# Patient Record
Sex: FEMALE | Race: WHITE | Employment: FULL TIME | URBAN - METROPOLITAN AREA
[De-identification: names, ages, dates, MRNs, and addresses within clinical notes are randomized per-mention and may not be internally consistent; named-entity substitution may affect disease eponyms.]

---

## 2017-04-09 ENCOUNTER — HOSPITAL ENCOUNTER (EMERGENCY)
Age: 32
Discharge: HOME | End: 2017-04-09
Attending: EMERGENCY MEDICINE | Admitting: EMERGENCY MEDICINE
Payer: COMMERCIAL

## 2017-04-09 ENCOUNTER — APPOINTMENT (OUTPATIENT)
Dept: GENERAL RADIOLOGY | Age: 32
End: 2017-04-09
Attending: EMERGENCY MEDICINE
Payer: COMMERCIAL

## 2017-04-09 ENCOUNTER — APPOINTMENT (OUTPATIENT)
Dept: CT IMAGING | Age: 32
End: 2017-04-09
Attending: EMERGENCY MEDICINE
Payer: COMMERCIAL

## 2017-04-09 VITALS
RESPIRATION RATE: 18 BRPM | OXYGEN SATURATION: 100 % | SYSTOLIC BLOOD PRESSURE: 128 MMHG | TEMPERATURE: 98 F | HEART RATE: 97 BPM | HEIGHT: 63 IN | WEIGHT: 225 LBS | DIASTOLIC BLOOD PRESSURE: 79 MMHG | BODY MASS INDEX: 39.87 KG/M2

## 2017-04-09 DIAGNOSIS — R20.2 TINGLING: Primary | ICD-10-CM

## 2017-04-09 DIAGNOSIS — R53.1 WEAKNESS: ICD-10-CM

## 2017-04-09 LAB
ALBUMIN SERPL BCP-MCNC: 3.8 G/DL (ref 3.5–5)
ALBUMIN/GLOB SERPL: 1.1 {RATIO} (ref 1.1–2.2)
ALP SERPL-CCNC: 33 U/L (ref 45–117)
ALT SERPL-CCNC: 26 U/L (ref 12–78)
ANION GAP BLD CALC-SCNC: 8 MMOL/L (ref 5–15)
APPEARANCE UR: CLEAR
AST SERPL W P-5'-P-CCNC: 7 U/L (ref 15–37)
BACTERIA URNS QL MICRO: NEGATIVE /HPF
BASOPHILS # BLD AUTO: 0 K/UL (ref 0–0.1)
BASOPHILS # BLD: 0 % (ref 0–1)
BILIRUB SERPL-MCNC: 0.3 MG/DL (ref 0.2–1)
BILIRUB UR QL: NEGATIVE
BUN SERPL-MCNC: 7 MG/DL (ref 6–20)
BUN/CREAT SERPL: 10 (ref 12–20)
CALCIUM SERPL-MCNC: 9.1 MG/DL (ref 8.5–10.1)
CHLORIDE SERPL-SCNC: 108 MMOL/L (ref 97–108)
CO2 SERPL-SCNC: 23 MMOL/L (ref 21–32)
COLOR UR: ABNORMAL
CREAT SERPL-MCNC: 0.72 MG/DL (ref 0.55–1.02)
EOSINOPHIL # BLD: 0.1 K/UL (ref 0–0.4)
EOSINOPHIL NFR BLD: 1 % (ref 0–7)
EPITH CASTS URNS QL MICRO: ABNORMAL /LPF
ERYTHROCYTE [DISTWIDTH] IN BLOOD BY AUTOMATED COUNT: 12.3 % (ref 11.5–14.5)
ERYTHROCYTE [SEDIMENTATION RATE] IN BLOOD: 7 MM/HR (ref 0–20)
GLOBULIN SER CALC-MCNC: 3.6 G/DL (ref 2–4)
GLUCOSE SERPL-MCNC: 99 MG/DL (ref 65–100)
GLUCOSE UR STRIP.AUTO-MCNC: NEGATIVE MG/DL
HCT VFR BLD AUTO: 39.4 % (ref 35–47)
HGB BLD-MCNC: 13.5 G/DL (ref 11.5–16)
HGB UR QL STRIP: ABNORMAL
HYALINE CASTS URNS QL MICRO: ABNORMAL /LPF (ref 0–5)
KETONES UR QL STRIP.AUTO: NEGATIVE MG/DL
LEUKOCYTE ESTERASE UR QL STRIP.AUTO: NEGATIVE
LYMPHOCYTES # BLD AUTO: 28 % (ref 12–49)
LYMPHOCYTES # BLD: 2.4 K/UL (ref 0.8–3.5)
MCH RBC QN AUTO: 29.9 PG (ref 26–34)
MCHC RBC AUTO-ENTMCNC: 34.3 G/DL (ref 30–36.5)
MCV RBC AUTO: 87.2 FL (ref 80–99)
MONOCYTES # BLD: 0.5 K/UL (ref 0–1)
MONOCYTES NFR BLD AUTO: 5 % (ref 5–13)
NEUTS SEG # BLD: 5.6 K/UL (ref 1.8–8)
NEUTS SEG NFR BLD AUTO: 66 % (ref 32–75)
NITRITE UR QL STRIP.AUTO: NEGATIVE
PH UR STRIP: 5.5 [PH] (ref 5–8)
PLATELET # BLD AUTO: 230 K/UL (ref 150–400)
POTASSIUM SERPL-SCNC: 3.7 MMOL/L (ref 3.5–5.1)
PROT SERPL-MCNC: 7.4 G/DL (ref 6.4–8.2)
PROT UR STRIP-MCNC: NEGATIVE MG/DL
RBC # BLD AUTO: 4.52 M/UL (ref 3.8–5.2)
RBC #/AREA URNS HPF: ABNORMAL /HPF (ref 0–5)
SODIUM SERPL-SCNC: 139 MMOL/L (ref 136–145)
SP GR UR REFRACTOMETRY: 1.02 (ref 1–1.03)
TSH SERPL DL<=0.05 MIU/L-ACNC: 1.15 UIU/ML (ref 0.36–3.74)
UROBILINOGEN UR QL STRIP.AUTO: 0.2 EU/DL (ref 0.2–1)
WBC # BLD AUTO: 8.6 K/UL (ref 3.6–11)
WBC URNS QL MICRO: ABNORMAL /HPF (ref 0–4)

## 2017-04-09 PROCEDURE — 71020 XR CHEST PA LAT: CPT

## 2017-04-09 PROCEDURE — 85652 RBC SED RATE AUTOMATED: CPT | Performed by: EMERGENCY MEDICINE

## 2017-04-09 PROCEDURE — 36415 COLL VENOUS BLD VENIPUNCTURE: CPT | Performed by: EMERGENCY MEDICINE

## 2017-04-09 PROCEDURE — 99284 EMERGENCY DEPT VISIT MOD MDM: CPT

## 2017-04-09 PROCEDURE — 80053 COMPREHEN METABOLIC PANEL: CPT | Performed by: EMERGENCY MEDICINE

## 2017-04-09 PROCEDURE — 81001 URINALYSIS AUTO W/SCOPE: CPT | Performed by: EMERGENCY MEDICINE

## 2017-04-09 PROCEDURE — 70450 CT HEAD/BRAIN W/O DYE: CPT

## 2017-04-09 PROCEDURE — 84443 ASSAY THYROID STIM HORMONE: CPT | Performed by: EMERGENCY MEDICINE

## 2017-04-09 PROCEDURE — 85025 COMPLETE CBC W/AUTO DIFF WBC: CPT | Performed by: EMERGENCY MEDICINE

## 2017-04-09 RX ORDER — NORGESTIMATE AND ETHINYL ESTRADIOL 0.25-0.035
1 KIT ORAL DAILY
COMMUNITY
End: 2020-12-22 | Stop reason: ALTCHOICE

## 2017-04-09 NOTE — DISCHARGE INSTRUCTIONS
Numbness and Tingling: Care Instructions  Your Care Instructions  Many things can cause numbness or tingling. Swelling may put pressure on a nerve. This could cause you to lose feeling or have a pins-and-needles sensation on part of your body. Nerves may be damaged from trauma, toxins, or diseases, such as diabetes or multiple sclerosis (MS). Sometimes, though, the cause is not clear. If there is no clear reason for your symptoms, and you are not having any other symptoms, your doctor may suggest watching and waiting for a while to see if the numbness or tingling goes away on its own. Your doctor may want you to have blood or nerve tests to find the cause of your symptoms. Follow-up care is a key part of your treatment and safety. Be sure to make and go to all appointments, and call your doctor if you are having problems. It's also a good idea to know your test results and keep a list of the medicines you take. How can you care for yourself at home? · If your doctor prescribes medicine, take it exactly as directed. Call your doctor if you think you are having a problem with your medicine. · If you have any swelling, put ice or a cold pack on the area for 10 to 20 minutes at a time. Put a thin cloth between the ice and your skin. When should you call for help? Call 911 anytime you think you may need emergency care. For example, call if:  · You have weakness, numbness, or tingling in both legs. · You lose bowel or bladder control. · You have symptoms of a stroke. These may include:  ¨ Sudden numbness, tingling, weakness, or loss of movement in your face, arm, or leg, especially on only one side of your body. ¨ Sudden vision changes. ¨ Sudden trouble speaking. ¨ Sudden confusion or trouble understanding simple statements. ¨ Sudden problems with walking or balance. ¨ A sudden, severe headache that is different from past headaches.   Watch closely for changes in your health, and be sure to contact your doctor if you have any problems, or if:  · You do not get better as expected. Where can you learn more? Go to http://matthew-efraín.info/. Enter P278 in the search box to learn more about \"Numbness and Tingling: Care Instructions. \"  Current as of: October 14, 2016  Content Version: 11.2  © 9935-1696 [employer]. Care instructions adapted under license by [employer] (which disclaims liability or warranty for this information). If you have questions about a medical condition or this instruction, always ask your healthcare professional. [place] [address] any warranty or liability for your use of this information. We hope that we have addressed all of your medical concerns. The examination and treatment you received in the Emergency Department were for an emergent problem and were not intended as complete care. It is important that you follow up with your healthcare provider(s) for ongoing care. If your symptoms worsen or do not improve as expected, and you are unable to reach your usual health care provider(s), you should return to the Emergency Department. Today's healthcare is undergoing tremendous change, and patient satisfaction surveys are one of the many tools to assess the quality of medical care. You may receive a survey from the [employer] regarding your experience in the Emergency Department. I hope that your experience has been completely positive, particularly the medical care that I provided. As such, please participate in the survey; anything less than excellent does not meet my expectations or intentions. [address] [facility] and [facility] participate in nationally recognized quality of care measures.   If your blood pressure is greater than 120/80, as reported below, we urge that you seek medical care to address the potential of high blood pressure, commonly known as hypertension. Hypertension can be hereditary or can be caused by certain medical conditions, pain, stress, or \"white coat syndrome. \"       Please make an appointment with your health care provider(s) for follow up of your Emergency Department visit. VITALS:   Patient Vitals for the past 8 hrs:   Temp Pulse Resp BP SpO2   04/09/17 1730 - - - 136/72 97 %   04/09/17 1711 - - - (!) 136/93 -   04/09/17 1630 - - - 133/65 96 %   04/09/17 1508 - - - 130/79 -   04/09/17 1329 98.2 °F (36.8 °C) 97 18 124/86 97 %          Thank you for allowing us to provide you with medical care today. We realize that you have many choices for your emergency care needs. Please choose us in the future for any continued health care needs. Tim Soler Darren, [address] [place] [address].   Office: [phone]            Recent Results (from the past 24 hour(s))   CBC WITH AUTOMATED DIFF    Collection Time: 04/09/17  1:36 PM   Result Value Ref Range    WBC 8.6 3.6 - 11.0 K/uL    RBC 4.52 3.80 - 5.20 M/uL    HGB 13.5 11.5 - 16.0 g/dL    HCT 39.4 35.0 - 47.0 %    MCV 87.2 80.0 - 99.0 FL    MCH 29.9 26.0 - 34.0 PG    MCHC 34.3 30.0 - 36.5 g/dL    RDW 12.3 11.5 - 14.5 %    PLATELET 868 189 - 909 K/uL    NEUTROPHILS 66 32 - 75 %    LYMPHOCYTES 28 12 - 49 %    MONOCYTES 5 5 - 13 %    EOSINOPHILS 1 0 - 7 %    BASOPHILS 0 0 - 1 %    ABS. NEUTROPHILS 5.6 1.8 - 8.0 K/UL    ABS. LYMPHOCYTES 2.4 0.8 - 3.5 K/UL    ABS. MONOCYTES 0.5 0.0 - 1.0 K/UL    ABS. EOSINOPHILS 0.1 0.0 - 0.4 K/UL    ABS.  BASOPHILS 0.0 0.0 - 0.1 K/UL   METABOLIC PANEL, COMPREHENSIVE    Collection Time: 04/09/17  1:36 PM   Result Value Ref Range    Sodium 139 136 - 145 mmol/L    Potassium 3.7 3.5 - 5.1 mmol/L    Chloride 108 97 - 108 mmol/L    CO2 23 21 - 32 mmol/L    Anion gap 8 5 - 15 mmol/L    Glucose 99 65 - 100 mg/dL    BUN 7 6 - 20 MG/DL    Creatinine 0.72 0.55 - 1.02 MG/DL    BUN/Creatinine ratio 10 (L) 12 - 20      GFR est AA >60 >60 ml/min/1.73m2    GFR est non-AA >60 >60 ml/min/1.73m2    Calcium 9.1 8.5 - 10.1 MG/DL    Bilirubin, total 0.3 0.2 - 1.0 MG/DL    ALT (SGPT) 26 12 - 78 U/L    AST (SGOT) 7 (L) 15 - 37 U/L    Alk. phosphatase 33 (L) 45 - 117 U/L    Protein, total 7.4 6.4 - 8.2 g/dL    Albumin 3.8 3.5 - 5.0 g/dL    Globulin 3.6 2.0 - 4.0 g/dL    A-G Ratio 1.1 1.1 - 2.2     SED RATE (ESR)    Collection Time: 04/09/17  1:36 PM   Result Value Ref Range    Sed rate, automated 7 0 - 20 mm/hr   TSH 3RD GENERATION    Collection Time: 04/09/17  1:36 PM   Result Value Ref Range    TSH 1.15 0.36 - 3.74 uIU/mL   URINALYSIS W/MICROSCOPIC    Collection Time: 04/09/17  3:51 PM   Result Value Ref Range    Color YELLOW/STRAW      Appearance CLEAR CLEAR      Specific gravity 1.016 1.003 - 1.030      pH (UA) 5.5 5.0 - 8.0      Protein NEGATIVE  NEG mg/dL    Glucose NEGATIVE  NEG mg/dL    Ketone NEGATIVE  NEG mg/dL    Bilirubin NEGATIVE  NEG      Blood TRACE (A) NEG      Urobilinogen 0.2 0.2 - 1.0 EU/dL    Nitrites NEGATIVE  NEG      Leukocyte Esterase NEGATIVE  NEG      WBC 0-4 0 - 4 /hpf    RBC 5-10 0 - 5 /hpf    Epithelial cells FEW FEW /lpf    Bacteria NEGATIVE  NEG /hpf    Hyaline cast 2-5 0 - 5 /lpf       Xr Chest Pa Lat    Result Date: 4/9/2017  Clinical indication: Shortness of breath. Frontal and lateral views of the chest obtained. The heart size is normal. There is no acute infiltrate. impression: No acute changes. Ct Head Wo Cont    Result Date: 4/9/2017  EXAM:  CT HEAD WO CONT INDICATION:   Head trauma, closed, mild, GCS >= 13, no risk factors, neuro exam normal COMPARISON: None. TECHNIQUE: Unenhanced CT of the head was performed using 5 mm images. Brain and bone windows were generated. CT dose reduction was achieved through use of a standardized protocol tailored for this examination and automatic exposure control for dose modulation. FINDINGS: There is no extra-axial fluid collection hemorrhage shift or masses her.  Ventricles are normal in size and midline. impression: No acute changes.

## 2017-04-09 NOTE — ED PROVIDER NOTES
HPI Comments: 34 y.o. female with no significant past medical history who presents with chief complaint of numbness. Pt reports a tingling and pins/needles sensation to her bilateral hands (multiple fingers) which doesn't extend past the wrist, bottoms of her bilateral feet that radiates upwards with walking, and her anterior abdomen only, no back or flank involvement, all onset three days ago. She says that she has been dropping things due to the numbness, not weakness. She also reports nausea today. She denies any new medications. Pt denies weakness, SOB, CP, headache, gait problem, visual disturbance, abdominal pain, N/V/D, diaphoresis, urinary symptoms, recent long car rides or plane trips, recent illnesses, hx of DM, weight changes. There are no other acute medical concerns at this time. Social hx: works as  for [employer]  Significant family hx: pt's mother had MS  PCP: None    Note written by Eulogio Diane, as dictated by Bekah Quan MD 3:38 PM      The history is provided by the patient and the spouse. History reviewed. No pertinent past medical history. History reviewed. No pertinent surgical history. History reviewed. No pertinent family history. Social History     Social History    Marital status:      Spouse name: N/A    Number of children: N/A    Years of education: N/A     Occupational History    Not on file. Social History Main Topics    Smoking status: Not on file    Smokeless tobacco: Not on file    Alcohol use Not on file    Drug use: Not on file    Sexual activity: Not on file     Other Topics Concern    Not on file     Social History Narrative    No narrative on file         ALLERGIES: Review of patient's allergies indicates no known allergies. Review of Systems   Constitutional: Negative for appetite change, chills, fever and unexpected weight change. HENT: Negative for congestion.     Eyes: Negative for visual disturbance. Respiratory: Negative for cough, chest tightness, shortness of breath and wheezing. Cardiovascular: Negative for chest pain. Gastrointestinal: Positive for nausea. Negative for abdominal pain, diarrhea and vomiting. Genitourinary: Negative for difficulty urinating, dysuria, frequency and urgency. Musculoskeletal: Negative for gait problem and joint swelling. Skin: Negative for rash. Neurological: Positive for numbness. Negative for speech difficulty, weakness and headaches. All other systems reviewed and are negative. Vitals:    04/09/17 1630 04/09/17 1711 04/09/17 1730 04/09/17 1800   BP: 133/65 (!) 136/93 136/72 128/79   Pulse:       Resp:    18   Temp:    98 °F (36.7 °C)   SpO2: 96%  97% 100%   Weight:       Height:                Physical Exam   Constitutional: She is oriented to person, place, and time. She appears well-developed and well-nourished. No distress. HENT:   Head: Normocephalic and atraumatic. Nose: Nose normal.   Eyes: Conjunctivae are normal. Pupils are equal, round, and reactive to light. No scleral icterus. Neck: Normal range of motion. Neck supple. No JVD present. No tracheal deviation present. No thyromegaly present. Cardiovascular: Normal rate, regular rhythm and normal heart sounds. No murmur heard. Pulmonary/Chest: Effort normal and breath sounds normal. No respiratory distress. She has no wheezes. She has no rales. Abdominal: Soft. Bowel sounds are normal. She exhibits no mass. There is no tenderness. There is no rebound and no guarding. Musculoskeletal: Normal range of motion. She exhibits no edema. Neurological: She is alert and oriented to person, place, and time. She has normal strength. No cranial nerve deficit. Coordination normal.   Normal strength in upper extremities   Skin: Skin is warm and dry. No rash noted. She is not diaphoretic. No erythema. Psychiatric: She has a normal mood and affect.  Her behavior is normal.   Nursing note and vitals reviewed. Note written by Eulogio Driver, as dictated by Ivan Llanos MD 3:38 PM      MDM  Number of Diagnoses or Management Options  Tingling:   Weakness:     ED Course       Procedures      PROGRESS NOTE:  6:32 PM  Will d/c pt home to follow up with neurology, suspect MS vs Guillain-Barré syndrome. Normal labs, CXR, and CT in ED today.

## 2017-04-09 NOTE — ED NOTES
Patient has received discharge instructions from ER MD, verbalizes understanding. Ambulatory upon discharge with .

## 2017-04-09 NOTE — ED TRIAGE NOTES
Pt presents with complaints of numbness in bilateral feet, bilateral hands, abdomen, and torso that began on Thursday while at rest.

## 2017-04-11 ENCOUNTER — OFFICE VISIT (OUTPATIENT)
Dept: NEUROLOGY | Age: 32
End: 2017-04-11

## 2017-04-11 VITALS
WEIGHT: 224 LBS | SYSTOLIC BLOOD PRESSURE: 120 MMHG | OXYGEN SATURATION: 99 % | RESPIRATION RATE: 18 BRPM | HEART RATE: 98 BPM | HEIGHT: 63 IN | DIASTOLIC BLOOD PRESSURE: 82 MMHG | BODY MASS INDEX: 39.69 KG/M2

## 2017-04-11 DIAGNOSIS — G37.3 ACUTE TRANSVERSE MYELITIS (HCC): Primary | ICD-10-CM

## 2017-04-11 DIAGNOSIS — R20.0 BILATERAL HAND NUMBNESS: ICD-10-CM

## 2017-04-11 NOTE — PROGRESS NOTES
Neurology Consult Note      HISTORY PROVIDED BY: patient    Chief Complaint:   Chief Complaint   Patient presents with    Tingling     in hands, feet, and front of torso. Persistant since 4/6/17      Subjective:    Lilo Buckley is a 34 y.o. right handed female who presents in consultation for sensory changes. Symptoms started at 1:15AM on Thursday. Tingling in bottom of feet that will move up her legs when she walks, also in hands bilaterally in 4th and 5th digits, now moving across to other fingers. Also has tingling from bra line, down to just above her pubic bone, around to sides. In last few days this area has also felt tight. No pain. No obvious weakness, has some difficulty typing and writing. No bladder incontinence. Has had diarrhea, but she is very anxious about her sxs. No recent illness, had \"48 hour\" GI illness in mid-February. Went to [place] and was told to go to the ED. Was seen in ED 4/9/17 - head CT reviewed in PACS and is normal. CMP, CBC, TSH, UA are all unremarkable. Reports balance issues as a child, attributed to being \"clumsy. \"  H/o MS in mother. No previous stroke like-sxs or sudden vision loss. History reviewed. No pertinent past medical history.    Past Surgical History:   Procedure Laterality Date    HX ROTATOR CUFF REPAIR Right 1999      Social History     Social History    Marital status:      Spouse name: N/A    Number of children: N/A    Years of education: N/A     Occupational History     at [address] [place] [address] History Main Topics    Smoking status: Light Tobacco Smoker    Smokeless tobacco: Not on file      Comment: smokes socially    Alcohol use 1.2 - 1.8 oz/week     2 - 3 Standard drinks or equivalent per week    Drug use: No    Sexual activity: Not on file     Other Topics Concern    Not on file     Social History Narrative    Lives in [place] with      Family History   Problem Relation Age of Onset    MS Mother Dec 48yo    Other Father      Estranged    No Known Problems Sister     No Known Problems Brother     Other Brother      Hep C         Objective:   Review of Systems   Constitutional: Positive for malaise/fatigue. HENT: Negative. Eyes: Negative. Respiratory: Negative. Cardiovascular: Negative. Gastrointestinal: Positive for diarrhea. Genitourinary: Negative. Musculoskeletal: Negative. Skin: Negative. Neurological: Positive for sensory change. Endo/Heme/Allergies: Negative. Psychiatric/Behavioral: Negative. No Known Allergies     Meds:  Outpatient Medications Prior to Visit   Medication Sig Dispense Refill    norgestimate-ethinyl estradiol ([facility], ,) 0.25-35 mg-mcg tab Take 1 Tab by mouth daily. No facility-administered medications prior to visit. Imaging:  MRI Results (most recent):  No results found for this or any previous visit. CT Results (most recent):    Results from Hospital Encounter encounter on 04/09/17   CT HEAD WO CONT   Narrative EXAM:  CT HEAD WO CONT    INDICATION:   Head trauma, closed, mild, GCS >= 13, no risk factors, neuro exam  normal    COMPARISON: None. TECHNIQUE: Unenhanced CT of the head was performed using 5 mm images. Brain and  bone windows were generated. CT dose reduction was achieved through use of a  standardized protocol tailored for this examination and automatic exposure  control for dose modulation. FINDINGS:  There is no extra-axial fluid collection hemorrhage shift or masses her. Ventricles are normal in size and midline. Impression impression: No acute changes.              Reviewed records in [employer] and [employer] tab today    Lab Review   Results for orders placed or performed during the hospital encounter of 04/09/17   CBC WITH AUTOMATED DIFF   Result Value Ref Range    WBC 8.6 3.6 - 11.0 K/uL    RBC 4.52 3.80 - 5.20 M/uL    HGB 13.5 11.5 - 16.0 g/dL    HCT 39.4 35.0 - 47.0 %    MCV 87.2 80.0 - 99.0 FL    MCH 29.9 26.0 - 34.0 PG    MCHC 34.3 30.0 - 36.5 g/dL    RDW 12.3 11.5 - 14.5 %    PLATELET 775 459 - 598 K/uL    NEUTROPHILS 66 32 - 75 %    LYMPHOCYTES 28 12 - 49 %    MONOCYTES 5 5 - 13 %    EOSINOPHILS 1 0 - 7 %    BASOPHILS 0 0 - 1 %    ABS. NEUTROPHILS 5.6 1.8 - 8.0 K/UL    ABS. LYMPHOCYTES 2.4 0.8 - 3.5 K/UL    ABS. MONOCYTES 0.5 0.0 - 1.0 K/UL    ABS. EOSINOPHILS 0.1 0.0 - 0.4 K/UL    ABS. BASOPHILS 0.0 0.0 - 0.1 K/UL   METABOLIC PANEL, COMPREHENSIVE   Result Value Ref Range    Sodium 139 136 - 145 mmol/L    Potassium 3.7 3.5 - 5.1 mmol/L    Chloride 108 97 - 108 mmol/L    CO2 23 21 - 32 mmol/L    Anion gap 8 5 - 15 mmol/L    Glucose 99 65 - 100 mg/dL    BUN 7 6 - 20 MG/DL    Creatinine 0.72 0.55 - 1.02 MG/DL    BUN/Creatinine ratio 10 (L) 12 - 20      GFR est AA >60 >60 ml/min/1.73m2    GFR est non-AA >60 >60 ml/min/1.73m2    Calcium 9.1 8.5 - 10.1 MG/DL    Bilirubin, total 0.3 0.2 - 1.0 MG/DL    ALT (SGPT) 26 12 - 78 U/L    AST (SGOT) 7 (L) 15 - 37 U/L    Alk.  phosphatase 33 (L) 45 - 117 U/L    Protein, total 7.4 6.4 - 8.2 g/dL    Albumin 3.8 3.5 - 5.0 g/dL    Globulin 3.6 2.0 - 4.0 g/dL    A-G Ratio 1.1 1.1 - 2.2     URINALYSIS W/MICROSCOPIC   Result Value Ref Range    Color YELLOW/STRAW      Appearance CLEAR CLEAR      Specific gravity 1.016 1.003 - 1.030      pH (UA) 5.5 5.0 - 8.0      Protein NEGATIVE  NEG mg/dL    Glucose NEGATIVE  NEG mg/dL    Ketone NEGATIVE  NEG mg/dL    Bilirubin NEGATIVE  NEG      Blood TRACE (A) NEG      Urobilinogen 0.2 0.2 - 1.0 EU/dL    Nitrites NEGATIVE  NEG      Leukocyte Esterase NEGATIVE  NEG      WBC 0-4 0 - 4 /hpf    RBC 5-10 0 - 5 /hpf    Epithelial cells FEW FEW /lpf    Bacteria NEGATIVE  NEG /hpf    Hyaline cast 2-5 0 - 5 /lpf   SED RATE (ESR)   Result Value Ref Range    Sed rate, automated 7 0 - 20 mm/hr   TSH 3RD GENERATION   Result Value Ref Range    TSH 1.15 0.36 - 3.74 uIU/mL        Exam:  Visit Vitals    /82    Pulse 98    Resp 18    Ht 5' 3\" (1.6 m)    Wt 101.6 kg (224 lb)    LMP 03/19/2017    SpO2 99%    BMI 39.68 kg/m2     General:  Alert, cooperative, no distress. Head:  Normocephalic, without obvious abnormality, atraumatic. Respiratory:  Heart:   Non labored breathing  Regular rate and rhythm, no murmurs   Neck:   2+ carotids, no bruits   Extremities: Warm, no cyanosis or edema. Pulses: 2+ radial pulses. Neurologic:  MS: Alert and oriented x 4, speech intact. Language intact. Attention and fund of knowledge appropriate. Recent and remote memory intact. Cranial Nerves:  II: visual fields Full to confrontation   II: pupils Equal, round, reactive to light   II: optic disc    III,VII: ptosis none   III,IV,VI: extraocular muscles  EOMI, no nystagmus or diplopia   V: facial light touch sensation  normal   VII: facial muscle function   symmetric   VIII: hearing intact   IX: soft palate elevation  normal   XI: trapezius strength  5/5   XI: sternocleidomastoid strength 5/5   XII: tongue  Midline     Motor: normal bulk and tone, no tremor              Strength: 5/5 throughout, no PD  Sensory: intact to LT, PP, vibratory sensation  Coordination: FTN and HTS intact, MIL intact,Romberg negative  Gait: normal gait, able to tandem walk  Reflexes: 2+ symmetric, toes downgoing           Assessment/Plan   Pt is a 34 y.o. RH female with sudden onset of sensory changes 4 days ago, reporting tingling in bottom of feet that will move up her legs when she walks, hands bilaterally in 4th and 5th digits, now moving across to other fingers, tingling from bra line down to just above her pubic bone, around to sides and this area feels tight. Family h/o MS in her mother. Exam is unremarkable. Head CT 4/9/17 reviewed in PACS and is normal. CMP, CBC, TSH, UA- unremarkable. History is concerning for spinal cord lesion, possible transverse myelitis. Recommend MRI of C and T-spine with contrast. F/u to be determined. ICD-10-CM ICD-9-CM    1.  Acute transverse myelitis ([address] G37.3 341.20 MRI CERV SPINE W WO CONT      MRI [facility] SPINE W WO CONT   2. Bilateral hand numbness R20.0 782.0 MRI CERV SPINE W WO CONT      MRI [facility] SPINE W WO CONT       Signed:   Yamilka Stubbs MD  4/11/2017

## 2017-04-11 NOTE — MR AVS SNAPSHOT
Visit Information Date & Time Provider Department Dept. Phone Encounter #  
 4/11/2017 11:00 AM Yamilka Stubbs MD [facility] at [address] [id number] Follow-up Instructions Return if symptoms worsen or fail to improve. Upcoming Health Maintenance Date Due DTaP/Tdap/Td series (1 - Tdap) 1/8/2005 PAP AKA CERVICAL CYTOLOGY 1/8/2005 INFLUENZA AGE 9 TO ADULT 8/1/2016 Allergies as of 4/11/2017  Review Complete On: 4/11/2017 By: Bashir Rodriguez LPN No Known Allergies Current Immunizations  Never Reviewed No immunizations on file. Not reviewed this visit You Were Diagnosed With   
  
 Codes Comments [facility]    -  Primary ICD-10-CM: G37.3 ICD-9-CM: 341.20 Bilateral hand numbness     ICD-10-CM: R20.0 ICD-9-CM: 141. 0 Vitals BP Pulse Resp Height(growth percentile) Weight(growth percentile) LMP  
 120/82 98 18 5' 3\" (1.6 m) 224 lb (101.6 kg) 03/19/2017 SpO2 BMI OB Status Smoking Status 99% 39.68 kg/m2 Having regular periods Light Tobacco Smoker Vitals History BMI and BSA Data Body Mass Index Body Surface Area  
 39.68 kg/m 2 2.13 m 2 Your Updated Medication List  
  
   
This list is accurate as of: 4/11/17 11:49 AM.  Always use your most recent med list.  
  
  
  
  
 Jody Pollack (28) 0.25-35 mg-mcg Tab Generic drug:  norgestimate-ethinyl estradiol Take 1 Tab by mouth daily. Follow-up Instructions Return if symptoms worsen or fail to improve. To-Do List   
 04/19/2017 Imaging:  MRI [facility] SPINE W WO CONT   
  
 04/20/2017 Imaging:  MRI CERV SPINE W WO CONT Introducing [facility]! [facility] introduces [email or web address] patient portal. Now you can access parts of your medical record, email your doctor's office, and request medication refills online.    
 
1. In your internet browser, go to [email or web address] 2. Click on the First Time User? Click Here link in the Sign In box. You will see the New Member Sign Up page. 3. Enter your [employer] Access Code exactly as it appears below. You will not need to use this code after youve completed the sign-up process. If you do not sign up before the expiration date, you must request a new code. · [employer] Access Code: [email or web address] Expires: 7/8/2017  2:18 PM 
 
4. Enter the last four digits of your Social Security Number (xxxx) and Date of Birth (mm/dd/yyyy) as indicated and click Submit. You will be taken to the next sign-up page. 5. Create a [employer] ID. This will be your [employer] login ID and cannot be changed, so think of one that is secure and easy to remember. 6. Create a [employer] password. You can change your password at any time. 7. Enter your Password Reset Question and Answer. This can be used at a later time if you forget your password. 8. Enter your e-mail address. You will receive e-mail notification when new information is available in [address] 9. Click Sign Up. You can now view and download portions of your medical record. 10. Click the Download Summary menu link to download a portable copy of your medical information. If you have questions, please visit the Frequently Asked Questions section of the [employer] website. Remember, [employer] is NOT to be used for urgent needs. For medical emergencies, dial 911. Now available from your iPhone and Android! Please provide this summary of care documentation to your next provider. Your primary care clinician is listed as NONE. If you have any questions after today's visit, please call [phone].

## 2017-04-15 ENCOUNTER — HOSPITAL ENCOUNTER (OUTPATIENT)
Dept: MRI IMAGING | Age: 32
Discharge: HOME | End: 2017-04-15
Attending: PSYCHIATRY & NEUROLOGY
Payer: COMMERCIAL

## 2017-04-15 VITALS — BODY MASS INDEX: 39.86 KG/M2 | WEIGHT: 225 LBS

## 2017-04-15 DIAGNOSIS — R20.0 BILATERAL HAND NUMBNESS: ICD-10-CM

## 2017-04-15 DIAGNOSIS — G37.3 ACUTE TRANSVERSE MYELITIS (HCC): ICD-10-CM

## 2017-04-15 PROCEDURE — 74011250636 HC RX REV CODE- 250/636: Performed by: PSYCHIATRY & NEUROLOGY

## 2017-04-15 PROCEDURE — 72157 MRI CHEST SPINE W/O & W/DYE: CPT

## 2017-04-15 PROCEDURE — A9585 GADOBUTROL INJECTION: HCPCS | Performed by: PSYCHIATRY & NEUROLOGY

## 2017-04-15 PROCEDURE — 72156 MRI NECK SPINE W/O & W/DYE: CPT

## 2017-04-15 RX ADMIN — GADOBUTROL 10 ML: 604.72 INJECTION INTRAVENOUS at 14:32

## 2017-04-17 ENCOUNTER — HOSPITAL ENCOUNTER (OUTPATIENT)
Dept: MRI IMAGING | Age: 32
Discharge: HOME | End: 2017-04-17
Payer: COMMERCIAL

## 2017-04-17 ENCOUNTER — TELEPHONE (OUTPATIENT)
Dept: NEUROLOGY | Age: 32
End: 2017-04-17

## 2017-04-17 ENCOUNTER — DOCUMENTATION ONLY (OUTPATIENT)
Dept: NEUROLOGY | Age: 32
End: 2017-04-17

## 2017-04-17 DIAGNOSIS — G95.9 SPINAL CORD LESION (HCC): ICD-10-CM

## 2017-04-17 DIAGNOSIS — G95.9 SPINAL CORD LESION (HCC): Primary | ICD-10-CM

## 2017-04-17 PROCEDURE — A9585 GADOBUTROL INJECTION: HCPCS | Performed by: RADIOLOGY

## 2017-04-17 PROCEDURE — 70553 MRI BRAIN STEM W/O & W/DYE: CPT

## 2017-04-17 NOTE — PROGRESS NOTES
Received fax from [employer] of approved coverage for MRI. Authorization valid from 4/17/17 to 7/16/17. Reference number: [id number].

## 2017-04-17 NOTE — TELEPHONE ENCOUNTER
Patient scheduled for MRI at 12 pm today with an arrival time of 11:30 am at [facility] (address and phone number given to pt). SHALONDA will obtain any authorization needed and will call the office back if there are any issues with that. Patient informed of MRI appointment and informed to come to [facility] tomorrow morning at 9 am and to go to registration. Informed patient she may have to wait as Dr. Sylvia Eason is on call in the hospital and will be doing this procedure in between her hospital consults. Patient was given an opportunity to ask questions, repeated information, and verbalized understanding.

## 2017-04-17 NOTE — TELEPHONE ENCOUNTER
Spoke with bed placement services and reserved a bed for tomorrow on [facility] - neuro floor.

## 2017-04-17 NOTE — TELEPHONE ENCOUNTER
Called pt and discussed MRI findings and next steps. Plan for MRI brain, then LP, and then Solumedrol 1g IV x 3 days, followed by slow oral steroid taper. Couch -  Pt needs LP set up, I will be doing the LP just like last time so we do not need radiology involved, I just need a bed for 3 hours tomorrow. I have ordered an MRI brain w/wo contrast which will need to be done today or early tomorrow prior to the LP. Blood work can be done at the time of the LP.

## 2017-04-18 ENCOUNTER — TELEPHONE (OUTPATIENT)
Dept: NEUROLOGY | Age: 32
End: 2017-04-18

## 2017-04-18 ENCOUNTER — HOSPITAL ENCOUNTER (OUTPATIENT)
Age: 32
Discharge: HOME | End: 2017-04-18
Attending: PSYCHIATRY & NEUROLOGY | Admitting: PSYCHIATRY & NEUROLOGY
Payer: COMMERCIAL

## 2017-04-18 VITALS
SYSTOLIC BLOOD PRESSURE: 110 MMHG | OXYGEN SATURATION: 97 % | RESPIRATION RATE: 14 BRPM | DIASTOLIC BLOOD PRESSURE: 67 MMHG | HEART RATE: 66 BPM | TEMPERATURE: 98.3 F

## 2017-04-18 DIAGNOSIS — G95.9 SPINAL CORD LESION (HCC): ICD-10-CM

## 2017-04-18 DIAGNOSIS — G37.9 DEMYELINATING DISEASE (HCC): ICD-10-CM

## 2017-04-18 DIAGNOSIS — G37.9 DEMYELINATING DISEASE (HCC): Primary | ICD-10-CM

## 2017-04-18 LAB
APTT PPP: 25.2 SEC (ref 22.1–32.5)
CHARACTER SMN: CLEAR
CHARACTER SMN: CLEAR
COLOR SPUN CSF: COLORLESS
COLOR SPUN CSF: COLORLESS
GLUCOSE CSF-MCNC: 60 MG/DL (ref 40–70)
INR PPP: 1 (ref 0.9–1.1)
PROT CSF-MCNC: 27 MG/DL (ref 15–45)
PROTHROMBIN TIME: 9.9 SEC (ref 9–11.1)
RBC # CSF: 0 /CU MM
RBC # CSF: 1 /CU MM
THERAPEUTIC RANGE,PTTT: NORMAL SECS (ref 58–77)
TUBE # CSF: 1
TUBE # CSF: 2
TUBE # CSF: 2
TUBE # CSF: 4
WBC # CSF: 1 /CU MM (ref 0–5)
WBC # CSF: 3 /CU MM (ref 0–5)

## 2017-04-18 PROCEDURE — 82164 ANGIOTENSIN I ENZYME TEST: CPT | Performed by: PSYCHIATRY & NEUROLOGY

## 2017-04-18 PROCEDURE — 36415 COLL VENOUS BLD VENIPUNCTURE: CPT | Performed by: PSYCHIATRY & NEUROLOGY

## 2017-04-18 PROCEDURE — 62270 DX LMBR SPI PNXR: CPT

## 2017-04-18 PROCEDURE — 83520 IMMUNOASSAY QUANT NOS NONAB: CPT | Performed by: PSYCHIATRY & NEUROLOGY

## 2017-04-18 PROCEDURE — 82042 OTHER SOURCE ALBUMIN QUAN EA: CPT | Performed by: PSYCHIATRY & NEUROLOGY

## 2017-04-18 PROCEDURE — 82945 GLUCOSE OTHER FLUID: CPT | Performed by: PSYCHIATRY & NEUROLOGY

## 2017-04-18 PROCEDURE — 89050 BODY FLUID CELL COUNT: CPT | Performed by: PSYCHIATRY & NEUROLOGY

## 2017-04-18 PROCEDURE — 85730 THROMBOPLASTIN TIME PARTIAL: CPT | Performed by: PSYCHIATRY & NEUROLOGY

## 2017-04-18 PROCEDURE — 99218 HC RM OBSERVATION: CPT

## 2017-04-18 PROCEDURE — 84157 ASSAY OF PROTEIN OTHER: CPT | Performed by: PSYCHIATRY & NEUROLOGY

## 2017-04-18 PROCEDURE — 87205 SMEAR GRAM STAIN: CPT | Performed by: PSYCHIATRY & NEUROLOGY

## 2017-04-18 PROCEDURE — 85610 PROTHROMBIN TIME: CPT | Performed by: PSYCHIATRY & NEUROLOGY

## 2017-04-18 PROCEDURE — 88108 CYTOPATH CONCENTRATE TECH: CPT | Performed by: PSYCHIATRY & NEUROLOGY

## 2017-04-18 NOTE — TELEPHONE ENCOUNTER
Spoke with patient regarding solu medrol infusions that Dr. Renny Moraes would like for her to have. Advised that there may be a delay with using home health. Informed patient that the outpatient infusion center is open until 7 pm at [facility]. Patient is agreeable to coming to the outpatient infusion center. Advised will call back with further details. She stated understanding.

## 2017-04-18 NOTE — DISCHARGE INSTRUCTIONS
DISCHARGE SUMMARY from Nurse    The following personal items are in your possession at time of discharge:    Dental Appliances: None        Home Medications: None  Jewelry: None  Clothing: At bedside  Other Valuables: None             PATIENT INSTRUCTIONS:    After general anesthesia or intravenous sedation, for 24 hours or while taking prescription Narcotics:  · Limit your activities  · Do not drive and operate hazardous machinery  · Do not make important personal or business decisions  · Do  not drink alcoholic beverages  · If you have not urinated within 8 hours after discharge, please contact your surgeon on call. Report the following to your surgeon:  · Excessive pain, swelling, redness or odor of or around the surgical area  · Temperature over 100.5  · Nausea and vomiting lasting longer than 4 hours or if unable to take medications  · Any signs of decreased circulation or nerve impairment to extremity: change in color, persistent  numbness, tingling, coldness or increase pain  · Any questions        What to do at Home:  Recommended activity: No lifting, Driving, or Strenuous exercise for the rest of today, lay down and take it easy. If you experience any of the following symptoms - a headache that is severe or lasts for 2 days, please follow up with Dr Daquan Lafleur or your neurology clinic. *  Please give a list of your current medications to your Primary Care Provider. *  Please update this list whenever your medications are discontinued, doses are      changed, or new medications (including over-the-counter products) are added. *  Please carry medication information at all times in case of emergency situations. These are general instructions for a healthy lifestyle:    No smoking/ No tobacco products/ Avoid exposure to second hand smoke    Surgeon General's Warning:  Quitting smoking now greatly reduces serious risk to your health.     Obesity, smoking, and sedentary lifestyle greatly increases your risk for illness    A healthy diet, regular physical exercise & weight monitoring are important for maintaining a healthy lifestyle    You may be retaining fluid if you have a history of heart failure or if you experience any of the following symptoms:  Weight gain of 3 pounds or more overnight or 5 pounds in a week, increased swelling in our hands or feet or shortness of breath while lying flat in bed. Please call your doctor as soon as you notice any of these symptoms; do not wait until your next office visit. Recognize signs and symptoms of STROKE:    F-face looks uneven    A-arms unable to move or move unevenly    S-speech slurred or non-existent    T-time-call 911 as soon as signs and symptoms begin-DO NOT go       Back to bed or wait to see if you get better-TIME IS BRAIN. Warning Signs of HEART ATTACK     Call 911 if you have these symptoms:   Chest discomfort. Most heart attacks involve discomfort in the center of the chest that lasts more than a few minutes, or that goes away and comes back. It can feel like uncomfortable pressure, squeezing, fullness, or pain.  Discomfort in other areas of the upper body. Symptoms can include pain or discomfort in one or both arms, the back, neck, jaw, or stomach.  Shortness of breath with or without chest discomfort.  Other signs may include breaking out in a cold sweat, nausea, or lightheadedness. Don't wait more than five minutes to call 911 - MINUTES MATTER! Fast action can save your life. Calling 911 is almost always the fastest way to get lifesaving treatment. Emergency Medical Services staff can begin treatment when they arrive -- up to an hour sooner than if someone gets to the hospital by car. The discharge information has been reviewed with the patient. The patient verbalized understanding.     Discharge medications reviewed with the patient and appropriate educational materials and side effects teaching were provided.

## 2017-04-18 NOTE — TELEPHONE ENCOUNTER
Spoke with Tory. She stated the radiologist wanted to make sure that Dr. Adrianne Salvador has reviewed the patient's MRI that she had done yesterday. Verbally discussed with Dr. Adrianne Salvador and she stated she had reviewed it.

## 2017-04-18 NOTE — TELEPHONE ENCOUNTER
Outpatient infusion center calling because they dont have any appt's available tomorrow for the [facility] location but they have some appt's for [address] [place] [address].  Please give her a call back

## 2017-04-18 NOTE — OP NOTES
LUMBAR PUNCTURE PROCEDURE NOTE    NAME:     Tea Alfred   :       1985   MRN:       [id number]   DATE:      2017    After the procedure was explained, risks reviewed including bleeding, infection, post-LP headache, and nerve root damage, and questions answered, consent signed and placed on chart. Pt was placed in the left lateral decubitus position. The patient was prepped and draped in the usual sterile fashion. 2 cc of 1% lidocaine was used for local anesthesia. The L3-L4 interspace was identified and the spinal needle inserted without return of CSF. She was then transferred to seated position, the L3-L4 interspace was identified and the spinal needle was inserted without return of CSF with immediate return of clear spinal fluid. Opening pressure was 41 mmHg. Approximately 26cc of CSF was collected. The spinal needle was removed. The pt was left in the supine position in stable condition. There was minimal if any blood loss. There were no immediate complications at the time of the procedure. The patient tolerated the procedure well.

## 2017-04-18 NOTE — IP AVS SNAPSHOT
[address] [facility] [address] [place] [address] 
[phone] Patient: Noy Love MRN: [id number] :1985 You are allergic to the following No active allergies Recent Documentation OB Status Smoking Status Having regular periods Light Tobacco Smoker Unresulted Labs Order Current Status ANGIOTENSIN CONVERTING ENZYME, CSF In process AQUAPORIN-4 RECEPTOR AB In process CULTURE, CSF W GRAM STAIN In process MULTIPLE SCLEROSIS PANEL In process Emergency Contacts Name Discharge Info Relation Home Work Mobile IrvinJunatalee DISCHARGE CAREGIVER [3] Spouse [3] [phone] Gee Bryan  Spouse [3] [phone] About your hospitalization You were admitted on:  2017 You last received care in the:  [facility] 6S NEURO-SCI TELE You were discharged on:  2017 Unit phone number:  [phone] Why you were hospitalized Your primary diagnosis was:  Not on File Your diagnoses also included:  Demyelinating Disease (Hcc) Providers Seen During Your Hospitalizations Provider Role Specialty Primary office phone Santy Marvin MD Attending Provider Neurology [phone] Your Primary Care Physician (PCP) Primary Care Physician Office Phone Office Fax NONE ** None ** ** None ** Follow-up Information Follow up With Details Comments Contact Info Santy Marvin MD  Keep planned follow up appointment with your neurology clinic. [address] [facility] 207 [address] [place] [address] 
[phone] Current Discharge Medication List  
  
CONTINUE these medications which have NOT CHANGED Dose & Instructions Dispensing Information Comments Morning Noon Evening Bedtime 4299 [facility] (28) 0.25-35 mg-mcg Tab Generic drug:  norgestimate-ethinyl estradiol Your last dose was: Your next dose is: Dose:  1 Tab Take 1 Tab by mouth daily. Refills:  0 Discharge Instructions DISCHARGE SUMMARY from Nurse The following personal items are in your possession at time of discharge: 
 
Dental Appliances: None Home Medications: None Jewelry: None Clothing: At bedside Other Valuables: None PATIENT INSTRUCTIONS: 
 
After general anesthesia or intravenous sedation, for 24 hours or while taking prescription Narcotics: · Limit your activities · Do not drive and operate hazardous machinery · Do not make important personal or business decisions · Do  not drink alcoholic beverages · If you have not urinated within 8 hours after discharge, please contact your surgeon on call. Report the following to your surgeon: 
· Excessive pain, swelling, redness or odor of or around the surgical area · Temperature over 100.5 · Nausea and vomiting lasting longer than 4 hours or if unable to take medications · Any signs of decreased circulation or nerve impairment to extremity: change in color, persistent  numbness, tingling, coldness or increase pain · Any questions What to do at Home: 
Recommended activity: No lifting, Driving, or Strenuous exercise for the rest of today, lay down and take it easy. If you experience any of the following symptoms - a headache that is severe or lasts for 2 days, please follow up with Dr Susannah Gonzalez or your neurology clinic. *  Please give a list of your current medications to your Primary Care Provider. *  Please update this list whenever your medications are discontinued, doses are 
    changed, or new medications (including over-the-counter products) are added. *  Please carry medication information at all times in case of emergency situations. These are general instructions for a healthy lifestyle: No smoking/ No tobacco products/ Avoid exposure to second hand smoke Surgeon General's Warning:  Quitting smoking now greatly reduces serious risk to your health. Obesity, smoking, and sedentary lifestyle greatly increases your risk for illness A healthy diet, regular physical exercise & weight monitoring are important for maintaining a healthy lifestyle You may be retaining fluid if you have a history of heart failure or if you experience any of the following symptoms:  Weight gain of 3 pounds or more overnight or 5 pounds in a week, increased swelling in our hands or feet or shortness of breath while lying flat in bed. Please call your doctor as soon as you notice any of these symptoms; do not wait until your next office visit. Recognize signs and symptoms of STROKE: 
 
F-face looks uneven A-arms unable to move or move unevenly S-speech slurred or non-existent T-time-call 911 as soon as signs and symptoms begin-DO NOT go Back to bed or wait to see if you get better-TIME IS BRAIN. Warning Signs of HEART ATTACK Call 911 if you have these symptoms: 
? Chest discomfort. Most heart attacks involve discomfort in the center of the chest that lasts more than a few minutes, or that goes away and comes back. It can feel like uncomfortable pressure, squeezing, fullness, or pain. ? Discomfort in other areas of the upper body. Symptoms can include pain or discomfort in one or both arms, the back, neck, jaw, or stomach. ? Shortness of breath with or without chest discomfort. ? Other signs may include breaking out in a cold sweat, nausea, or lightheadedness. Don't wait more than five minutes to call [address]! Fast action can save your life. Calling 911 is almost always the fastest way to get lifesaving treatment. Emergency Medical Services staff can begin treatment when they arrive  up to an hour sooner than if someone gets to the hospital by car. The discharge information has been reviewed with the patient.   The patient verbalized understanding. Discharge medications reviewed with the patient and appropriate educational materials and side effects teaching were provided. Discharge Instructions Attachments/References LUMBAR PUNCTURE (ENGLISH) Discharge Orders None Introducing [facility] SERVICES! Isabel Amezcua introduces [employer] patient portal. Now you can access parts of your medical record, email your doctor's office, and request medication refills online. 1. In your internet browser, go to [email or web address] [email or web address] 2. Click on the First Time User? Click Here link in the Sign In box. You will see the New Member Sign Up page. 3. Enter your [facility] Access Code exactly as it appears below. You will not need to use this code after youve completed the sign-up process. If you do not sign up before the expiration date, you must request a new code. · [facility] Access Code: [email or web address] Expires: 7/8/2017  2:18 PM 
 
4. Enter the last four digits of your Social Security Number (xxxx) and Date of Birth (mm/dd/yyyy) as indicated and click Submit. You will be taken to the next sign-up page. 5. Create a [facility] ID. This will be your [facility] login ID and cannot be changed, so think of one that is secure and easy to remember. 6. Create a [facility] password. You can change your password at any time. 7. Enter your Password Reset Question and Answer. This can be used at a later time if you forget your password. 8. Enter your e-mail address. You will receive e-mail notification when new information is available in [address] 9. Click Sign Up. You can now view and download portions of your medical record. 10. Click the Download Summary menu link to download a portable copy of your medical information. If you have questions, please visit the Frequently Asked Questions section of the [employer] website. Remember, [facility] is NOT to be used for urgent needs. For medical emergencies, dial 911. Now available from your iPhone and Android! General Information Please provide this summary of care documentation to your next provider. Patient Signature:  ____________________________________________________________ Date:  ____________________________________________________________  
  
Michelle Robles Provider Signature:  ____________________________________________________________ Date:  ____________________________________________________________ More Information Lumbar Puncture: After Your Visit Your Care Instructions A lumbar puncture (also called a spinal tap) is a test to check the fluid that surrounds and protects your spinal cord and brain. Your doctor may have done this test to look for an infection. In some cases, a lumbar puncture is done to release pressure from too much fluid or to look for diseases such as multiple sclerosis. The fluid that was taken is often sent to a lab for different tests. Your doctor may get some answers right away, but other answers take hours to days. Your doctor will call you with the results. You may feel tired or have a mild backache or a headache after the test. Some people have trouble sleeping for 1 or 2 days. Follow-up care is a key part of your treatment and safety. Be sure to make and go to all appointments, and call your doctor if you are having problems. Its also a good idea to know your test results and keep a list of the medicines you take. How can you care for yourself at home? · Drink plenty of liquids in the next few hours. This may prevent a headache or keep a headache from being severe. · Your doctor may tell you to lie flat in bed for 1 to 4 hours. This may prevent a headache. · Get plenty of rest. 
· If your doctor prescribed antibiotics, take them as directed. Do not stop taking them just because you feel better. You need to take the full course of antibiotics. · Take anti-inflammatory medicines to reduce a headache or backache. These include ibuprofen (Advil, Motrin) and naproxen (Aleve). Read and follow all instructions on the label. When should you call for help? Call your doctor now or seek immediate medical care if: 
· You have a fever with a stiff neck or a severe headache. · You have any drainage or bleeding from the site of the puncture. · You feel numb or lose strength below the puncture site. Watch closely for changes in your health, and be sure to contact your doctor if: 
· You do not get better as expected. Where can you learn more? Go to [employer] Enter [id number] in the search box to learn more about \"Lumbar Puncture: After Your Visit. \"  
© 3816-8230 [employer]wise, [employer]. Care instructions adapted under license by [employer] (which disclaims liability or warranty for this information). This care instruction is for use with your licensed healthcare professional. If you have questions about a medical condition or this instruction, always ask your healthcare professional. [place] [address] any warranty or liability for your use of this information. Content Version: 5.1.035960; Last Revised: September 13, 2011

## 2017-04-18 NOTE — TELEPHONE ENCOUNTER
Rx and completed order form faxed to [place] [address] at [facility]. Informed patient of this information. Confirmed receipt of fax by Jason Malhotra at [place] [address].

## 2017-04-19 ENCOUNTER — HOSPITAL ENCOUNTER (OUTPATIENT)
Dept: INFUSION THERAPY | Age: 32
Discharge: HOME | End: 2017-04-19
Payer: COMMERCIAL

## 2017-04-19 ENCOUNTER — TELEPHONE (OUTPATIENT)
Dept: NEUROLOGY | Age: 32
End: 2017-04-19

## 2017-04-19 VITALS
DIASTOLIC BLOOD PRESSURE: 68 MMHG | RESPIRATION RATE: 18 BRPM | HEART RATE: 65 BPM | OXYGEN SATURATION: 98 % | SYSTOLIC BLOOD PRESSURE: 135 MMHG | TEMPERATURE: 98.1 F

## 2017-04-19 LAB — AQP4 H2O CHANNEL AB SERPL IA-ACNC: 1.9 U/ML (ref 0–3)

## 2017-04-19 PROCEDURE — 74011250636 HC RX REV CODE- 250/636: Performed by: PSYCHIATRY & NEUROLOGY

## 2017-04-19 PROCEDURE — 96365 THER/PROPH/DIAG IV INF INIT: CPT

## 2017-04-19 PROCEDURE — 74011000258 HC RX REV CODE- 258: Performed by: PSYCHIATRY & NEUROLOGY

## 2017-04-19 RX ORDER — SODIUM CHLORIDE 0.9 % (FLUSH) 0.9 %
5-10 SYRINGE (ML) INJECTION AS NEEDED
Status: ACTIVE | OUTPATIENT
Start: 2017-04-19 | End: 2017-04-20

## 2017-04-19 RX ADMIN — SODIUM CHLORIDE 1000 MG: 900 INJECTION, SOLUTION INTRAVENOUS at 13:55

## 2017-04-19 RX ADMIN — Medication 10 ML: at 13:50

## 2017-04-19 NOTE — PROGRESS NOTES
1330 Pt arrived ambulatory and in no distress for Solu-Medrol, 1/3. Assessment complete, pt reports tingling in both hands and feet. Discussed medication, purpose, possible side effects, and schedule. Pt verbalizes understanding. 22 gauge IV started in right forearm without difficulty. Patient Vitals for the past 12 hrs:   Temp Pulse Resp BP SpO2   04/19/17 1522 98.1 °F (36.7 °C) 65 18 135/68 98 %   04/19/17 1340 98 °F (36.7 °C) 88 18 140/83 98 %     Medications:  Solu-Medrol 1 gram IV    1525 Pt observed for 30 mins post infusion, no s/s of reaction. IV left intact for treatment tomorrow, flushed, alcohol cap intact covered with Coban. Discharged home ambulatory and in no distress. Next appointment 04/20/17 @ [facility].

## 2017-04-19 NOTE — TELEPHONE ENCOUNTER
Noted patient has been scheduled for infusions. Return call to [address] [place] [address] with outpatient infusion. Spoke with Dwight Islas. He stated that, per Tory, she spoke with \"someone at the doctor's office\" yesterday and set up the already scheduled appointments. They did not provide a name of who they spoke with and they did not call the patient to confirm these appointments. No documentation in the chart stating anyone spoke with the infusion center about these appointments. Writer called patient and she agreed to the scheduled appointments. Addresses and phone numbers were provided to the patient. Writer apologized for any inconvenience. Patient was given an opportunity to ask questions, repeated information, and verbalized understanding.

## 2017-04-19 NOTE — TELEPHONE ENCOUNTER
Pt was scheduled for an IV treatment at [address] [place] [address] this afternoon and was hoping to have it moved to a different location, would prefer not to go to that location.

## 2017-04-19 NOTE — PROGRESS NOTES
Problem: Knowledge Deficit  Goal: *Verbalizes understanding of procedures and medications  Outcome: Progressing Towards Goal  Solu Medrol 1/3. Discussed medication, purpose, procedure, and frequency. Pt verbalizes understanding.

## 2017-04-19 NOTE — TELEPHONE ENCOUNTER
Spoke with patient and advised her to call the [facility] location to see if they can reschedule her for a different location. Informed her that there are locations on the campuses of [facility] and [facility] as well. Patient asked if she had to start the infusions today. Writer told patient that Dr. Josué Herrera stated she did want her start them today. Patient was given an opportunity to ask questions, repeated information, and verbalized understanding.

## 2017-04-19 NOTE — PROGRESS NOTES
Spoke with patient and informed her that, per Dr. Eli Espino, CSF studies so far are normal. Patient was given an opportunity to ask questions, repeated information, and verbalized understanding.

## 2017-04-20 ENCOUNTER — HOSPITAL ENCOUNTER (OUTPATIENT)
Dept: INFUSION THERAPY | Age: 32
Discharge: HOME | End: 2017-04-20
Payer: COMMERCIAL

## 2017-04-20 VITALS
SYSTOLIC BLOOD PRESSURE: 125 MMHG | TEMPERATURE: 97.2 F | DIASTOLIC BLOOD PRESSURE: 74 MMHG | HEART RATE: 63 BPM | RESPIRATION RATE: 18 BRPM

## 2017-04-20 LAB
ALB CSF/SERPL: 3 {RATIO} (ref 0–8)
ALBUMIN CSF-MCNC: 13 MG/DL (ref 11–48)
ALBUMIN SERPL-MCNC: 4.1 G/DL (ref 3.5–5.5)
IGG CSF-MCNC: 1.7 MG/DL (ref 0–8.6)
IGG SERPL-MCNC: 710 MG/DL (ref 700–1600)
IGG SYNTH RATE SER+CSF CALC-MRATE: 0.7 MG/DAY
IGG/ALB CLEAR SER+CSF-RTO: 0.8 (ref 0–0.7)
IGG/ALB CSF: 0.13 {RATIO} (ref 0–0.25)
OLIGOCLONAL BANDS.IT SER+CSF QL: ABNORMAL

## 2017-04-20 PROCEDURE — 74011250636 HC RX REV CODE- 250/636: Performed by: PSYCHIATRY & NEUROLOGY

## 2017-04-20 PROCEDURE — 74011000258 HC RX REV CODE- 258: Performed by: PSYCHIATRY & NEUROLOGY

## 2017-04-20 PROCEDURE — 96365 THER/PROPH/DIAG IV INF INIT: CPT

## 2017-04-20 RX ORDER — SODIUM CHLORIDE 0.9 % (FLUSH) 0.9 %
5-10 SYRINGE (ML) INJECTION AS NEEDED
Status: ACTIVE | OUTPATIENT
Start: 2017-04-20 | End: 2017-04-21

## 2017-04-20 RX ORDER — SODIUM CHLORIDE 9 MG/ML
25 INJECTION, SOLUTION INTRAVENOUS AS NEEDED
Status: DISPENSED | OUTPATIENT
Start: 2017-04-20 | End: 2017-04-21

## 2017-04-20 RX ADMIN — Medication 10 ML: at 16:11

## 2017-04-20 RX ADMIN — SODIUM CHLORIDE 1000 MG: 900 INJECTION, SOLUTION INTRAVENOUS at 16:10

## 2017-04-20 NOTE — PROGRESS NOTES
Outpatient Infusion Center Progress Note    0973 Pt admit to [facility] for Day 2 of 3 Solumedrol ambulatory in stable condition. Assessment completed. No new concerns voiced. PIV in place from yesterday and flushed with positive blood return. IV attached to NS     Visit Vitals    /74 (BP 1 Location: Left arm, BP Patient Position: At rest)    Pulse 63    Temp 97.2 °F (36.2 °C)    Resp 18    LMP 03/19/2017       Medications:  Solu-medrol 1 gram IV    1715 Pt tolerated treatment well. PIV is tender at the insertion site so patient requested it be removed. PIV D/c'd. D/c home ambulatory in no distress. Pt aware of next appointment scheduled for 4/21/17.

## 2017-04-21 ENCOUNTER — HOSPITAL ENCOUNTER (OUTPATIENT)
Dept: INFUSION THERAPY | Age: 32
Discharge: HOME | End: 2017-04-21
Payer: COMMERCIAL

## 2017-04-21 ENCOUNTER — TELEPHONE (OUTPATIENT)
Dept: NEUROLOGY | Age: 32
End: 2017-04-21

## 2017-04-21 VITALS
RESPIRATION RATE: 18 BRPM | TEMPERATURE: 97.5 F | HEART RATE: 59 BPM | SYSTOLIC BLOOD PRESSURE: 132 MMHG | DIASTOLIC BLOOD PRESSURE: 81 MMHG

## 2017-04-21 LAB — ANGIO CONVERTING ENZ, CSF: 1.3 U/L (ref 0–2.5)

## 2017-04-21 PROCEDURE — 74011000258 HC RX REV CODE- 258: Performed by: PSYCHIATRY & NEUROLOGY

## 2017-04-21 PROCEDURE — 96365 THER/PROPH/DIAG IV INF INIT: CPT

## 2017-04-21 PROCEDURE — 74011250636 HC RX REV CODE- 250/636: Performed by: PSYCHIATRY & NEUROLOGY

## 2017-04-21 RX ORDER — PREDNISONE 10 MG/1
TABLET ORAL
Qty: 28 TAB | Refills: 0 | Status: SHIPPED | OUTPATIENT
Start: 2017-04-21 | End: 2017-08-01

## 2017-04-21 RX ADMIN — SODIUM CHLORIDE 1000 MG: 900 INJECTION, SOLUTION INTRAVENOUS at 17:26

## 2017-04-21 NOTE — PROGRESS NOTES
Outpatient Infusion Center Short Visit Progress Note    6575 Pt admit to [facility] for 3 of 3 Solumedrol ambulatory in stable condition. Assessment completed. No new concerns voiced. Visit Vitals    /81    Pulse (!) 59    Temp 97.5 °F (36.4 °C)    Resp 18    LMP 03/19/2017       PIV with positive blood return. Lab drawn, flushed, heparinized and de-accessed per protocol. Medications:  Solumedrol    1630 Pt tolerated treatment well. D/c home ambulatory in no distress. Pt has no more appointments.

## 2017-04-24 NOTE — TELEPHONE ENCOUNTER
Spoke with patient. She stated the message was old and that she spoke with Dr. Nolan Little on Friday evening, 4/21/17. She asked when she should follow up. Writer told patient, Dr. Nolan Little has not informed this nurse of a follow up time frame as of yet. She also stated that she has a vacation planned for 5/13/17 to go out of the country. She was wondering if she is \"allowed\" to go. Writer informed patient that Dr. Nolan Little is out of the office this week but will let her know of these concerns and will call her back with Dr. Nevin johnson when she comes back to the office. She stated understanding.

## 2017-04-24 NOTE — TELEPHONE ENCOUNTER
Message from [address] [place] [address]. \"Pls cl: finishing her last steroid, does not have an rx for when she is finished. Requesting a call back on Monday to be advised of what she needs to do. \"

## 2017-04-25 LAB
BACTERIA SPEC CULT: NORMAL
BACTERIA SPEC CULT: NORMAL
GRAM STN SPEC: NORMAL
GRAM STN SPEC: NORMAL
SERVICE CMNT-IMP: NORMAL

## 2017-04-27 NOTE — TELEPHONE ENCOUNTER
Verbally discussed with Dr. Isaias Guallpa. She advised that if Ms. Ольга Siddiqi was to be traveling to a [place] then she did not anticipate any issues to arise. If she were to be traveling to a developing country then would advise her to reconsidered because of the concern about seeking medical treatment should she have any issues. She also stated that the medication that the patient is taking would take a few months to take full effect. And as far as her follow up appointment, patient can be scheduled in the next available slot. Called patient and informed her of the above recommendations from Dr. Isaias Guallpa. She stated that she was going to the [address] [place] [address] in the [facility] and would have access to the airport to go to [place] if she needed medical treatment. She stated she was going from 5/13-5/20. She reported that overall her symptoms are improving, although she is still having some tingling in her right hand. Writer advised to let us know if that does not improve or if it worsens. Patient was scheduled for an appointment on 6/1/17 at 3:40pm. Patient asked if she had any limitations and writer told patient that Dr. Isaias Guallpa did not mention any but could have Dr. Isaias Guallpa call her back when she returns to the office next week, likely mid week. Patient was given an opportunity to ask questions, repeated information, and verbalized understanding.

## 2017-05-03 NOTE — TELEPHONE ENCOUNTER
Returned pt call. Tingling in feet has resolved, left hand comes and goes, right hand has not improved. Banding feeling is gone, has occasional tingling in upper abdomen. She is reading on the Internet. She has drastically changed her diet. Discussed diagnosis, lab results, upcoming trip, symptoms sometimes seen in MS, avoid smoking, start taking Vit D 1000units/day and we can check a level at some point. Has appt on June 1st to discuss DMT options.

## 2017-06-01 ENCOUNTER — OFFICE VISIT (OUTPATIENT)
Dept: NEUROLOGY | Age: 32
End: 2017-06-01

## 2017-06-01 VITALS
SYSTOLIC BLOOD PRESSURE: 130 MMHG | OXYGEN SATURATION: 98 % | HEIGHT: 63 IN | WEIGHT: 214 LBS | DIASTOLIC BLOOD PRESSURE: 86 MMHG | RESPIRATION RATE: 18 BRPM | HEART RATE: 94 BPM | BODY MASS INDEX: 37.92 KG/M2

## 2017-06-01 DIAGNOSIS — G35 MULTIPLE SCLEROSIS (HCC): Primary | ICD-10-CM

## 2017-06-01 DIAGNOSIS — G37.3 ACUTE TRANSVERSE MYELITIS (HCC): ICD-10-CM

## 2017-06-01 RX ORDER — GLUCOSAMINE SULFATE 1500 MG
1000 POWDER IN PACKET (EA) ORAL DAILY
COMMUNITY
End: 2022-01-03

## 2017-06-01 RX ORDER — GLATIRAMER 40 MG/ML
40 INJECTION, SOLUTION SUBCUTANEOUS
Qty: 12 SYRINGE | Refills: 5 | Status: SHIPPED | OUTPATIENT
Start: 2017-06-01 | End: 2018-02-06

## 2017-06-01 NOTE — PROGRESS NOTES
Neurology Progress Note      HISTORY PROVIDED BY: patient    Chief Complaint:   Chief Complaint   Patient presents with    Tingling     f/u still with tingling in right hand but not constant      Subjective:   Pt is a 34 y.o. RH female initially and last seen on 4/11/17 with sudden onset of sensory changes 4 days prior, reporting tingling in bottom of feet that will move up her legs when she walks, hands bilaterally in 4th and 5th digits, now moving across to other fingers, tingling from bra line down to just above her pubic bone, around to sides and this area feels tight. Family h/o MS in her mother. Exam was unremarkable. Head CT 4/9/17 was normal. CMP, CBC, TSH, UA- unremarkable. History concerning for spinal cord lesion, possible transverse myelitis. Recommended MRI of C and T-spine with contrast.     She returns for f/u. MRI of C and T spine with contrast on 4/15/17 with abnormal signal with enhancement and possible subtle mass effect in the cord at C4. MRI brain w/wo contrast 4/17/17 with multiple T2/Flair hyperintensities in the PV and subcortical WM, per radiology <9 lesions, with enhancing lesion in the anterior right temporal lobe. Findings c/w active demyelinating disease. LP on 4/18/17 with normal OP 21mmHg, not 41 as note states, CSF studies were normal except +OGB and elevated IgG Index. Clinical history, MRI findings, and CSF findings c/w MS. Pt was started on IV Solumedrol 1g daily x 3 days, then oral prednisone taper. Pt reports marked improvement in sxs, has occ tingling in hands. Notices it when she is tired and with sugar possibly. No new sxs. She has done some research regarding tx for MS. History reviewed. No pertinent past medical history.    Past Surgical History:   Procedure Laterality Date    HX ROTATOR CUFF REPAIR Right 1999      Social History     Social History    Marital status:      Spouse name: N/A    Number of children: N/A    Years of education: N/A Occupational History     at [facility]       Social History Main Topics    Smoking status: Light Tobacco Smoker    Smokeless tobacco: Not on file      Comment: smokes socially    Alcohol use 1.2 - 1.8 oz/week     2 - 3 Standard drinks or equivalent per week    Drug use: No    Sexual activity: Not on file     Other Topics Concern    Not on file     Social History Narrative    Lives in [facility] with      Family History   Problem Relation Age of Onset    MS Mother      Dec 50yo    Other Father      Estranged    No Known Problems Sister     No Known Problems Brother     Other Brother      Hep C         Objective:   Review of Systems : Per HPI, o/w neg    No Known Allergies     Meds:  Outpatient Medications Prior to Visit   Medication Sig Dispense Refill    predniSONE (DELTASONE) 10 mg tablet 6 tabs in AM x 2 days, then 4 tabs in AM x 2 days, then 2 tabs in AM x 2 days, then 1 tab in AM x 2 days, then 1/2 tab in AM x 2 days 28 Tab 0    norgestimate-ethinyl estradiol ([facility] [place] [address],) 0.25-35 mg-mcg tab Take 1 Tab by mouth daily. No facility-administered medications prior to visit. Imaging:  MRI Results (most recent):    Results from Hospital Encounter encounter on 04/17/17   MRI BRAIN W WO CONT   Narrative CLINICAL HISTORY: Cervical cord lesion for intracranial demyelinating process  Abnormal T2 hyperintense focus in the posterior aspect of the cervical cord at  C4. INDICATION: Cervical spinal cord lesion, evaluate for MS.      COMPARISON: MR C-spine 4/15/2017    TECHNIQUE: MR examination of the brain includes axial and sagittal T1  pre-and-post contrast, axial T2, axial FLAIR, axial gradient echo, axial DWI,  coronal T1 postcontrast.    Contrast: The patient was administered gadolinium-based contrast material axial  and coronal T1-weighted postcontrast enhanced imaging was obtained.     FINDINGS:   There is an abnormal area of increased T2 signal intensity at the anterior pole  of the right temporal lobe identified on FLAIR imaging. Postcontrast enhancement  of this lesion, right temporal lobe subcortical white matter. In addition there is an area of abnormal increased T2 signal intensity adjacent  to the posterior horn of the left lateral ventricle which measures approximately  9.5 x 8.5 mm. Additional scattered minimal subcortical foci of increased T2  signal intensity right posterior frontal lobe, left corona radiata. There are  less than 9 supratentorial foci identified. There is no postcontrast  enhancement. There is no diffusion restriction. The C4 lesion is not included on this examination. There is no intracranial mass, hemorrhage or evidence of acute infarction. There is no Chiari or syrinx. The pituitary and infundibulum are grossly  unremarkable. There is no skull base mass. Cerebellopontine angles are grossly  unremarkable. The major intracranial vascular flow-voids are unremarkable. There is no abnormal meningeal enhancement. The cavernous sinuses are symmetric. Optic chiasm and infundibulum grossly unremarkable. Orbits are grossly  symmetric. Dural venous sinuses are grossly patent. The brain architecture is normal. There is no evidence of midline shift or  mass-effect. There are no extra-axial fluid collections. The mastoid air cells and paranasal sinuses are well pneumatized and clear. Impression IMPRESSION:      Scattered foci of increased T2 signal intensity in the periventricular white  matter, subcortical white matter as well. Less than 9 lesions are identified. There is a lesion in the subcortical white matter of the right temporal lobe  anteriorly which demonstrates postcontrast enhancement, this may represent a  focus of active demyelination. Imaging findings are suggestive of demyelinating process.      23X         CT Results (most recent):    Results from Hospital Encounter encounter on 04/09/17   CT HEAD WO CONT   Narrative EXAM:  CT HEAD WO CONT    INDICATION:   Head trauma, closed, mild, GCS >= 13, no risk factors, neuro exam  normal    COMPARISON: None. TECHNIQUE: Unenhanced CT of the head was performed using 5 mm images. Brain and  bone windows were generated. CT dose reduction was achieved through use of a  standardized protocol tailored for this examination and automatic exposure  control for dose modulation. FINDINGS:  There is no extra-axial fluid collection hemorrhage shift or masses her. Ventricles are normal in size and midline. Impression impression: No acute changes.              Reviewed records in [email or web address] tab today    Lab Review   Results for orders placed or performed during the hospital encounter of 04/18/17   CULTURE, CSF W GRAM STAIN   Result Value Ref Range    Special Requests: NO SPECIAL REQUESTS      GRAM STAIN NO WBC'S SEEN      GRAM STAIN NO ORGANISMS SEEN      Culture result: NO GROWTH ON SOLID MEDIA AT 4 DAYS      Culture result: NO GROWTH IN THIO BROTH AT 7 DAYS     PROTHROMBIN TIME + INR   Result Value Ref Range    INR 1.0 0.9 - 1.1      Prothrombin time 9.9 9.0 - 11.1 sec   PTT   Result Value Ref Range    aPTT 25.2 22.1 - 32.5 sec    aPTT, therapeutic range     58.0 - 77.0 SECS   AQUAPORIN-4 RECEPTOR AB   Result Value Ref Range    NMO IgG 1.9 0.0 - 3.0 U/mL   CELL COUNT, CSF   Result Value Ref Range    CSF TUBE NO. 1      CSF COLOR COLORLESS      CSF APPEARANCE CLEAR      CSF RBCS 1 (H) 0 /cu mm    CSF WBCS 1 0 - 5 /cu mm   PROTEIN, CSF   Result Value Ref Range    Tube No. 2      Protein,CSF 27 15 - 45 MG/DL   GLUCOSE, CSF   Result Value Ref Range    Tube No. 2      Glucose,CSF 60 40 - 70 MG/DL   ANGIOTENSIN CONVERTING ENZYME, CSF   Result Value Ref Range    Angio Converting Enz, CSF 1.3 0.0 - 2.5 U/L   CELL COUNT, CSF   Result Value Ref Range    CSF TUBE NO. 4      CSF COLOR COLORLESS      CSF APPEARANCE CLEAR      CSF RBCS 0 0 /cu mm    CSF WBCS 3 0 - 5 /cu mm   MULTIPLE SCLEROSIS PANEL   Result Value Ref Range    IgG, Quant, CSF 1.7 0.0 - 8.6 mg/dL    Albumin, CSF 13 11 - 48 mg/dL    Albumin, serum 4.1 3.5 - 5.5 g/dL    Immunoglobulin G, Qt. 710 700 - 1600 mg/dL    IgG/Alb ratio, CSF 0.13 0.00 - 0.25      CSF IgG Index 0.8 (H) 0.0 - 0.7      IgG Synthesis Rate, CSF 0.7 NEG 9.9 TO +3.3 mg/day    Oligoclonal Bands Comment      CSF/Serum Alb. Index 3 0 - 8          Exam:  Visit Vitals    /86    Pulse 94    Resp 18    Ht 5' 3\" (1.6 m)    Wt 97.1 kg (214 lb)    SpO2 98%    BMI 37.91 kg/m2     General:  Alert, cooperative, no distress. Head:  Normocephalic, without obvious abnormality, atraumatic. Respiratory:  Heart:   Non labored breathing  Regular rate and rhythm, no murmurs   Neck:      Extremities: Warm, no cyanosis or edema. Pulses: 2+ radial pulses. Neurologic:  MS: Alert and oriented x 4, speech intact. Language intact. Attention and fund of knowledge appropriate. Recent and remote memory intact.   Cranial Nerves:  II: visual fields Full to confrontation   II: pupils Equal, round, reactive to light   II: optic disc    III,VII: ptosis none   III,IV,VI: extraocular muscles  EOMI, no nystagmus or diplopia   V: facial light touch sensation     VII: facial muscle function   symmetric   VIII: hearing intact   IX: soft palate elevation  normal   XI: trapezius strength     XI: sternocleidomastoid strength    XII: tongue  Midline     Motor: normal bulk and tone, no tremor              Strength: 5/5 throughout, no PD  Sensory: intact to LT, PP  Coordination: FTN and HTS intact, MIL intact  Gait: normal gait, able to tandem walk  Reflexes: 2+ symmetric           Assessment/Plan   Pt is a 34 y.o. RH female with sudden onset of sensory changes in April, 2017,  tingling in bottom of feet that moved up her legs with walking, hands bilaterally in 4th and 5th digits, now moving across to other fingers, tingling from bra line down to just above her pubic bone, around to sides and this area feels tight. Sxs improved after course of IV Solumedrol. Family h/o MS in her mother. MRI of C and T spine with contrast on 4/15/17 with abnormal signal with enhancement and possible subtle mass effect in the cord at C4. MRI brain w/wo contrast 4/17/17 with multiple T2/Flair hyperintensities in the PV and subcortical WM, per radiology <9 lesions, with enhancing lesion in the anterior right temporal lobe. LP on 4/18/17 with normal OP 21mmHg, CSF studies were normal except +OGB and elevated IgG Index. Exam is unremarkable. Discussed diagnosis of MS, treatment goals and options. Will start Copaxone 40mg three times a week, side effects reviewed. Follow-up in clinic in 2 months, instructed to call in the interim with any questions or concerns. ICD-10-CM ICD-9-CM    1. Multiple sclerosis ([address] G35 340    2. Acute transverse myelitis ([address] G37.3 341.20        Signed:   Hernandez Fuentse MD  6/1/2017

## 2017-06-01 NOTE — PATIENT INSTRUCTIONS
[facility]   Statement to Patients  April 1, 2014      In an effort to ensure the large volume of patient prescription refills is processed in the most efficient and expeditious manner, we are asking our patients to assist us by calling your Pharmacy for all prescription refills, this will include also your  Mail Order Pharmacy. The pharmacy will contact our office electronically to continue the refill process. Please do not wait until the last minute to call your pharmacy. We need at least 48 hours (2days) to fill prescriptions. We also encourage you to call your pharmacy before going to  your prescription to make sure it is ready. With regard to controlled substance prescription refill requests (narcotic refills) that need to be picked up at our office, we ask your cooperation by providing us with at least 72 hours (3days) notice that you will need a refill. We will not refill narcotic prescription refill requests after 4:00pm on any weekday, Monday through Thursday, or after 2:00pm on Fridays, or on the weekends. We encourage everyone to explore another way of getting your prescription refill request processed using [employer], our patient web portal through our electronic medical record system. [employer] is an efficient and effective way to communicate your medication request directly to the office and  downloadable as an darrin on your smart phone . [employer] also features a review functionality that allows you to view your medication list as well as leave messages for your physician. Are you ready to get connected? If so please review the attatched instructions or speak to any of our staff to get you set up right away! Thank you so much for your cooperation. Should you have any questions please contact our Practice Administrator.     The Physicians and Staff,  [facility]           Please bring all medication bottles, including vitamins, supplements and any over-the-counter medications, to your next office visit.

## 2017-06-01 NOTE — PROGRESS NOTES
Patient here for follow up on tingling. She reported that it has improved and is only in right hand now but is not constant.  She complained of times where she does not have an appetite and feels nauseous but unsure if this is related to anything neurological.

## 2017-06-01 NOTE — MR AVS SNAPSHOT
Visit Information Date & Time Provider Department Dept. Phone Encounter #  
 6/1/2017  3:40 PM Chantal Cutler MD [facility] at [address] [phone] Follow-up Instructions Return in about 2 months (around 8/1/2017). Upcoming Health Maintenance Date Due Pneumococcal 19-64 Medium Risk (1 of 1 - PPSV23) 1/8/2003 DTaP/Tdap/Td series (1 - Tdap) 1/8/2005 PAP AKA CERVICAL CYTOLOGY 1/8/2005 INFLUENZA AGE 9 TO ADULT 8/1/2017 Allergies as of 6/1/2017  Review Complete On: 6/1/2017 By: Mariana Rueda LPN No Known Allergies Current Immunizations  Reviewed on 4/19/2017 No immunizations on file. Not reviewed this visit Vitals BP Pulse Resp Height(growth percentile) Weight(growth percentile) SpO2  
 130/86 94 18 5' 3\" (1.6 m) 214 lb (97.1 kg) 98% BMI OB Status Smoking Status 37.91 kg/m2 Having regular periods Light Tobacco Smoker Vitals History BMI and BSA Data Body Mass Index Body Surface Area  
 37.91 kg/m 2 2.08 m 2 Preferred Pharmacy Pharmacy Name Phone [id number] [address], [address] [facility] [phone] Your Updated Medication List  
  
   
This list is accurate as of: 6/1/17  4:13 PM.  Always use your most recent med list.  
  
  
  
  
 MULTIVITAMIN PO Take  by mouth.  
  
 predniSONE 10 mg tablet Commonly known as:  DELTASONE  
6 tabs in AM x 2 days, then 4 tabs in AM x 2 days, then 2 tabs in AM x 2 days, then 1 tab in AM x 2 days, then 1/2 tab in AM x 2 days SOUR CHERRY EXTRACT PO Take  by mouth. [address] [facility] (61) 0.25-35 mg-mcg Tab Generic drug:  norgestimate-ethinyl estradiol Take 1 Tab by mouth daily. TURMERIC ROOT EXTRACT PO Take  by mouth. VITAMIN D3 1,000 unit Cap Generic drug:  cholecalciferol Take  by mouth daily. Follow-up Instructions Return in about 2 months (around 8/1/2017). Patient Instructions PRESCRIPTION REFILL POLICY [facility] Statement to Patients April 1, 2014 In an effort to ensure the large volume of patient prescription refills is processed in the most efficient and expeditious manner, we are asking our patients to assist us by calling your Pharmacy for all prescription refills, this will include also your  Mail Order Pharmacy. The pharmacy will contact our office electronically to continue the refill process. Please do not wait until the last minute to call your pharmacy. We need at least 48 hours (2days) to fill prescriptions. We also encourage you to call your pharmacy before going to  your prescription to make sure it is ready. With regard to controlled substance prescription refill requests (narcotic refills) that need to be picked up at our office, we ask your cooperation by providing us with at least 72 hours (3days) notice that you will need a refill. We will not refill narcotic prescription refill requests after 4:00pm on any weekday, Monday through Thursday, or after 2:00pm on Fridays, or on the weekends. We encourage everyone to explore another way of getting your prescription refill request processed using [employer], our patient web portal through our electronic medical record system. [employer] is an efficient and effective way to communicate your medication request directly to the office and  downloadable as an darrin on your smart phone . [employer] also features a review functionality that allows you to view your medication list as well as leave messages for your physician. Are you ready to get connected? If so please review the attatched instructions or speak to any of our staff to get you set up right away! Thank you so much for your cooperation. Should you have any questions please contact our Practice Administrator. The Physicians and Staff,  [facility] Please bring all medication bottles, including vitamins, supplements and any over-the-counter medications, to your next office visit. Introducing [facility]! [employer] introduces [employer] patient portal. Now you can access parts of your medical record, email your doctor's office, and request medication refills online. 1. In your internet browser, go to [email or web address] 2. Click on the First Time User? Click Here link in the Sign In box. You will see the New Member Sign Up page. 3. Enter your [employer] Access Code exactly as it appears below. You will not need to use this code after youve completed the sign-up process. If you do not sign up before the expiration date, you must request a new code. · [employer] Access Code: [email or web address] Expires: 7/8/2017  2:18 PM 
 
4. Enter the last four digits of your Social Security Number (xxxx) and Date of Birth (mm/dd/yyyy) as indicated and click Submit. You will be taken to the next sign-up page. 5. Create a [employer] ID. This will be your [employer] login ID and cannot be changed, so think of one that is secure and easy to remember. 6. Create a [employer] password. You can change your password at any time. 7. Enter your Password Reset Question and Answer. This can be used at a later time if you forget your password. 8. Enter your e-mail address. You will receive e-mail notification when new information is available in [address] 9. Click Sign Up. You can now view and download portions of your medical record. 10. Click the Download Summary menu link to download a portable copy of your medical information. If you have questions, please visit the Frequently Asked Questions section of the [employer] website. Remember, [employer] is NOT to be used for urgent needs. For medical emergencies, dial 911. Now available from your iPhone and Android! Please provide this summary of care documentation to your next provider. Your primary care clinician is listed as NONE. If you have any questions after today's visit, please call [phone].

## 2017-06-02 ENCOUNTER — TELEPHONE (OUTPATIENT)
Dept: NEUROLOGY | Age: 32
End: 2017-06-02

## 2017-06-02 NOTE — TELEPHONE ENCOUNTER
Copaxone form faxed to patient at [phone] for patient signature and instructed to fax back to the office. Fax confirmed.

## 2017-06-12 ENCOUNTER — DOCUMENTATION ONLY (OUTPATIENT)
Dept: NEUROLOGY | Age: 32
End: 2017-06-12

## 2017-06-12 NOTE — PROGRESS NOTES
Copaxone start up form completed and faxed to [employer] at [phone]. Received fax confirmation that the enrollment form and Rx was received on 6/12/17.

## 2017-08-01 ENCOUNTER — OFFICE VISIT (OUTPATIENT)
Dept: NEUROLOGY | Age: 32
End: 2017-08-01

## 2017-08-01 VITALS
WEIGHT: 202.4 LBS | DIASTOLIC BLOOD PRESSURE: 84 MMHG | HEIGHT: 63 IN | BODY MASS INDEX: 35.86 KG/M2 | HEART RATE: 82 BPM | SYSTOLIC BLOOD PRESSURE: 110 MMHG | OXYGEN SATURATION: 99 % | RESPIRATION RATE: 18 BRPM

## 2017-08-01 DIAGNOSIS — G37.3 ACUTE TRANSVERSE MYELITIS (HCC): ICD-10-CM

## 2017-08-01 DIAGNOSIS — G35 MULTIPLE SCLEROSIS (HCC): Primary | ICD-10-CM

## 2017-08-01 NOTE — PROGRESS NOTES
Neurology Progress Note      HISTORY PROVIDED BY: patient    Chief Complaint:   Chief Complaint   Patient presents with    Multiple Sclerosis     f/u      Subjective:   Pt is a 34 y.o. RH female last seen in clinic on 6/1/17 in f/u for MS. Pt had sudden onset of sensory changes in April, 2017,  tingling in bottom of feet that moved up her legs with walking, hands bilaterally in 4th and 5th digits, now moving across to other fingers, tingling from bra line down to just above her pubic bone, around to sides and this area feels tight. Sxs improved after course of IV Solumedrol. Family h/o MS in her mother. MRI of C and T spine with contrast on 4/15/17 with abnormal signal with enhancement and possible subtle mass effect in the cord at C4. MRI brain w/wo contrast 4/17/17 with multiple T2/Flair hyperintensities in the PV and subcortical WM, per radiology <9 lesions, with enhancing lesion in the anterior right temporal lobe. LP on 4/18/17 with normal OP 21mmHg, CSF studies were normal except +OGB and elevated IgG Index. Exam was unremarkable. Discussed diagnosis of MS, treatment goals and options. Started Copaxone 40mg three times a week. She returns for f/u. She is doing well. Started Copaxone on 6/30/17, no side effects. Is having mild injection site reaction, feels a lump and has some discoloration at injection site, no pain. Lesions heal over time. No new sxs.    Past Medical History:   Diagnosis Date    Multiple sclerosis ([facility] [address]       Past Surgical History:   Procedure Laterality Date    HX ROTATOR CUFF REPAIR Right 1999      Social History     Social History    Marital status:      Spouse name: N/A    Number of children: N/A    Years of education: N/A     Occupational History     at [address] [place] [address] History Main Topics    Smoking status: Light Tobacco Smoker    Smokeless tobacco: Never Used      Comment: smokes socially    Alcohol use 1.2 - 1.8 oz/week 2 - 3 Standard drinks or equivalent per week    Drug use: No    Sexual activity: Not on file     Other Topics Concern    Not on file     Social History Narrative    Lives in [place] with      Family History   Problem Relation Age of Onset    MS Mother      Dec 48yo    Other Father      Estranged    No Known Problems Sister     No Known Problems Brother     Other Brother      Hep C         Objective:   Review of Systems : Per HPI, o/w neg    No Known Allergies     Meds:  Outpatient Medications Prior to Visit   Medication Sig Dispense Refill    MULTIVITAMIN PO Take  by mouth.  cholecalciferol (VITAMIN D3) 1,000 unit cap Take  by mouth daily.  SOUR CHERRY EXTRACT PO Take  by mouth.  TURMERIC ROOT EXTRACT PO Take  by mouth.  glatiramer (COPAXONE) 40 mg/mL injection 40 mg by SubCUTAneous route every Sunday, Tuesday, Thursday. 12 Syringe 5    norgestimate-ethinyl estradiol (SPRINTEC, 28,) 0.25-35 mg-mcg tab Take 1 Tab by mouth daily.  predniSONE (DELTASONE) 10 mg tablet 6 tabs in AM x 2 days, then 4 tabs in AM x 2 days, then 2 tabs in AM x 2 days, then 1 tab in AM x 2 days, then 1/2 tab in AM x 2 days 28 Tab 0     No facility-administered medications prior to visit. Imaging:  MRI Results (most recent):    Results from Hospital Encounter encounter on 04/17/17   MRI BRAIN W WO CONT   Narrative CLINICAL HISTORY: Cervical cord lesion for intracranial demyelinating process  Abnormal T2 hyperintense focus in the posterior aspect of the cervical cord at  C4.           INDICATION: Cervical spinal cord lesion, evaluate for MS.      COMPARISON: MR C-spine 4/15/2017    TECHNIQUE: MR examination of the brain includes axial and sagittal T1  pre-and-post contrast, axial T2, axial FLAIR, axial gradient echo, axial DWI,  coronal T1 postcontrast.    Contrast: The patient was administered gadolinium-based contrast material axial  and coronal T1-weighted postcontrast enhanced imaging was obtained. FINDINGS:   There is an abnormal area of increased T2 signal intensity at the anterior pole  of the right temporal lobe identified on FLAIR imaging. Postcontrast enhancement  of this lesion, right temporal lobe subcortical white matter. In addition there is an area of abnormal increased T2 signal intensity adjacent  to the posterior horn of the left lateral ventricle which measures approximately  9.5 x 8.5 mm. Additional scattered minimal subcortical foci of increased T2  signal intensity right posterior frontal lobe, left corona radiata. There are  less than 9 supratentorial foci identified. There is no postcontrast  enhancement. There is no diffusion restriction. The C4 lesion is not included on this examination. There is no intracranial mass, hemorrhage or evidence of acute infarction. There is no Chiari or syrinx. The pituitary and infundibulum are grossly  unremarkable. There is no skull base mass. Cerebellopontine angles are grossly  unremarkable. The major intracranial vascular flow-voids are unremarkable. There is no abnormal meningeal enhancement. The cavernous sinuses are symmetric. Optic chiasm and infundibulum grossly unremarkable. Orbits are grossly  symmetric. Dural venous sinuses are grossly patent. The brain architecture is normal. There is no evidence of midline shift or  mass-effect. There are no extra-axial fluid collections. The mastoid air cells and paranasal sinuses are well pneumatized and clear. Impression IMPRESSION:      Scattered foci of increased T2 signal intensity in the periventricular white  matter, subcortical white matter as well. Less than 9 lesions are identified. There is a lesion in the subcortical white matter of the right temporal lobe  anteriorly which demonstrates postcontrast enhancement, this may represent a  focus of active demyelination. Imaging findings are suggestive of demyelinating process.      23X         CT Results (most recent):    Results from [place] encounter on 04/09/17   CT HEAD WO CONT   Narrative EXAM:  CT HEAD WO CONT    INDICATION:   Head trauma, closed, mild, GCS >= 13, no risk factors, neuro exam  normal    COMPARISON: None. TECHNIQUE: Unenhanced CT of the head was performed using 5 mm images. Brain and  bone windows were generated. CT dose reduction was achieved through use of a  standardized protocol tailored for this examination and automatic exposure  control for dose modulation. FINDINGS:  There is no extra-axial fluid collection hemorrhage shift or masses her. Ventricles are normal in size and midline. Impression impression: No acute changes.              Reviewed records in [facility] tab today    Lab Review   Results for orders placed or performed during the hospital encounter of 04/18/17   CULTURE, CSF W GRAM STAIN   Result Value Ref Range    Special Requests: NO SPECIAL REQUESTS      GRAM STAIN NO WBC'S SEEN      GRAM STAIN NO ORGANISMS SEEN      Culture result: NO GROWTH ON SOLID MEDIA AT 4 DAYS      Culture result: NO GROWTH IN THIO BROTH AT 7 DAYS     PROTHROMBIN TIME + INR   Result Value Ref Range    INR 1.0 0.9 - 1.1      Prothrombin time 9.9 9.0 - 11.1 sec   PTT   Result Value Ref Range    aPTT 25.2 22.1 - 32.5 sec    aPTT, therapeutic range     58.0 - 77.0 SECS   AQUAPORIN-4 RECEPTOR AB   Result Value Ref Range    NMO IgG 1.9 0.0 - 3.0 U/mL   CELL COUNT, CSF   Result Value Ref Range    CSF TUBE NO. 1      CSF COLOR COLORLESS      CSF APPEARANCE CLEAR      CSF RBCS 1 (H) 0 /cu mm    CSF WBCS 1 0 - 5 /cu mm   PROTEIN, CSF   Result Value Ref Range    Tube No. 2      Protein,CSF 27 15 - 45 MG/DL   GLUCOSE, CSF   Result Value Ref Range    Tube No. 2      Glucose,CSF 60 40 - 70 MG/DL   ANGIOTENSIN CONVERTING ENZYME, CSF   Result Value Ref Range    Angio Converting Enz, CSF 1.3 0.0 - 2.5 U/L   CELL COUNT, CSF   Result Value Ref Range    CSF TUBE NO. 4      CSF COLOR COLORLESS      CSF APPEARANCE CLEAR      CSF RBCS 0 0 /cu mm    CSF WBCS 3 0 - 5 /cu mm   MULTIPLE SCLEROSIS PANEL   Result Value Ref Range    IgG, Quant, CSF 1.7 0.0 - 8.6 mg/dL    Albumin, CSF 13 11 - 48 mg/dL    Albumin, serum 4.1 3.5 - 5.5 g/dL    Immunoglobulin G, Qt. 710 700 - 1600 mg/dL    IgG/Alb ratio, CSF 0.13 0.00 - 0.25      CSF IgG Index 0.8 (H) 0.0 - 0.7      IgG Synthesis Rate, CSF 0.7 NEG 9.9 TO +3.3 mg/day    Oligoclonal Bands Comment      CSF/Serum Alb. Index 3 0 - 8          Exam:  Visit Vitals    /84    Pulse 82    Resp 18    Ht 5' 3\" (1.6 m)    Wt 91.8 kg (202 lb 6.4 oz)    SpO2 99%    BMI 35.85 kg/m2     General:  Alert, cooperative, no distress. Head:  Normocephalic, without obvious abnormality, atraumatic. Respiratory:  Heart:   Non labored breathing  Regular rate and rhythm, no murmurs   Neck:      Extremities: Warm, no cyanosis or edema. Pulses: 2+ radial pulses. Neurologic:  MS: Alert and oriented x 4, speech intact. Language intact. Attention and fund of knowledge appropriate. Recent and remote memory intact.   Cranial Nerves:  II: visual fields Full to confrontation   II: pupils Equal, round, reactive to light   II: optic disc    III,VII: ptosis none   III,IV,VI: extraocular muscles  EOMI, no nystagmus or diplopia   V: facial light touch sensation     VII: facial muscle function   symmetric   VIII: hearing intact   IX: soft palate elevation  normal   XI: trapezius strength     XI: sternocleidomastoid strength    XII: tongue  Midline     Motor: normal bulk and tone, no tremor              Strength: 5/5 throughout, no PD  Sensory: intact to LT, PP  Coordination: FTN and HTS intact, MIL intact  Gait: normal gait, able to tandem walk  Reflexes: 2+ symmetric           Assessment/Plan   Pt is a 34 y.o. RH female with MS diagnosed after presenting with sudden onset of sensory changes in April, 2017,  tingling in bottom of feet that moved up her legs with walking, hands bilaterally in 4th and 5th digits, now moving across to other fingers, tingling from bra line down to just above her pubic bone, around to sides and this area feels tight. Sxs improved after course of IV Solumedrol. Family h/o MS in her mother. MRI of C and T spine with contrast on 4/15/17 with abnormal signal with enhancement and possible subtle mass effect in the cord at C4. MRI brain w/wo contrast 4/17/17 with multiple T2/Flair hyperintensities in the PV and subcortical WM, per radiology <9 lesions, with enhancing lesion in the anterior right temporal lobe. LP on 4/18/17 with normal OP 21mmHg, CSF studies were normal except +OGB and elevated IgG Index. Exam remains unremarkable. Continue Copaxone 40mg three times a week. Will check monitoring labs at next visit. F/u in 5 months, instructed to call in the interim if needed. ICD-10-CM ICD-9-CM    1. Multiple sclerosis ([facility] [address] G35 340    2. Acute transverse myelitis ([facility] [address] G37.3 341.20        Signed:   Chrystine Gaucher, MD  8/1/2017

## 2017-08-01 NOTE — PATIENT INSTRUCTIONS
[facility]   Statement to Patients  April 1, 2014      In an effort to ensure the large volume of patient prescription refills is processed in the most efficient and expeditious manner, we are asking our patients to assist us by calling your Pharmacy for all prescription refills, this will include also your  Mail Order Pharmacy. The pharmacy will contact our office electronically to continue the refill process. Please do not wait until the last minute to call your pharmacy. We need at least 48 hours (2days) to fill prescriptions. We also encourage you to call your pharmacy before going to  your prescription to make sure it is ready. With regard to controlled substance prescription refill requests (narcotic refills) that need to be picked up at our office, we ask your cooperation by providing us with at least 72 hours (3days) notice that you will need a refill. We will not refill narcotic prescription refill requests after 4:00pm on any weekday, Monday through Thursday, or after 2:00pm on Fridays, or on the weekends. We encourage everyone to explore another way of getting your prescription refill request processed using Henable, our patient web portal through our electronic medical record system. Henable is an efficient and effective way to communicate your medication request directly to the office and  downloadable as an darrin on your smart phone . Henable also features a review functionality that allows you to view your medication list as well as leave messages for your physician. Are you ready to get connected? If so please review the attatched instructions or speak to any of our staff to get you set up right away! Thank you so much for your cooperation. Should you have any questions please contact our Practice Administrator.     The Physicians and Staff,  [facility]         Please bring all medication bottles, including vitamins, supplements and any over-the-counter medications, to your next office visit.

## 2017-08-01 NOTE — PROGRESS NOTES
Patient here to follow up on MS. Has noticed some discoloration and some nodules at injection sites after injecting Copaxone injections. No other concerns.

## 2017-08-01 NOTE — MR AVS SNAPSHOT
Visit Information Date & Time Provider Department Dept. Phone Encounter #  
 8/1/2017  3:20 PM Marcio Link MD [facility] at [address] [id number] Follow-up Instructions Return in about 5 months (around 1/1/2018). Upcoming Health Maintenance Date Due Pneumococcal 19-64 Medium Risk (1 of 1 - PPSV23) 1/8/2003 DTaP/Tdap/Td series (1 - Tdap) 1/8/2005 PAP AKA CERVICAL CYTOLOGY 1/8/2005 INFLUENZA AGE 9 TO ADULT 8/1/2017 Allergies as of 8/1/2017  Review Complete On: 8/1/2017 By: Tutu Mccoy LPN No Known Allergies Current Immunizations  Reviewed on 4/19/2017 No immunizations on file. Not reviewed this visit You Were Diagnosed With   
  
 Codes Comments Multiple sclerosis ([facility]    -  Primary ICD-10-CM: G35 
ICD-9-CM: 798 Acute transverse myelitis ([facility]     ICD-10-CM: G37.3 ICD-9-CM: 341.20 Vitals BP Pulse Resp Height(growth percentile) Weight(growth percentile) SpO2  
 110/84 82 18 5' 3\" (1.6 m) 202 lb 6.4 oz (91.8 kg) 99% BMI OB Status Smoking Status 35.85 kg/m2 Having regular periods Light Tobacco Smoker Vitals History BMI and BSA Data Body Mass Index Body Surface Area  
 35.85 kg/m 2 2.02 m 2 Preferred Pharmacy Pharmacy Name Phone [id number] [address], [address] [facility] [phone] Your Updated Medication List  
  
   
This list is accurate as of: 8/1/17  3:53 PM.  Always use your most recent med list.  
  
  
  
  
 glatiramer 40 mg/mL injection Commonly known as:  COPAXONE 40 mg by SubCUTAneous route every Sunday, Tuesday, Thursday. MULTIVITAMIN PO Take  by mouth. SOUR CHERRY EXTRACT PO Take  by mouth. [facility] (78) 0.25-35 mg-mcg Tab Generic drug:  norgestimate-ethinyl estradiol Take 1 Tab by mouth daily. TURMERIC ROOT EXTRACT PO Take  by mouth. VITAMIN D3 1,000 unit Cap Generic drug:  cholecalciferol Take  by mouth daily. Follow-up Instructions Return in about 5 months (around 1/1/2018). Patient Instructions PRESCRIPTION REFILL POLICY [facility] Statement to Patients April 1, 2014 In an effort to ensure the large volume of patient prescription refills is processed in the most efficient and expeditious manner, we are asking our patients to assist us by calling your Pharmacy for all prescription refills, this will include also your  Mail Order Pharmacy. The pharmacy will contact our office electronically to continue the refill process. Please do not wait until the last minute to call your pharmacy. We need at least 48 hours (2days) to fill prescriptions. We also encourage you to call your pharmacy before going to  your prescription to make sure it is ready. With regard to controlled substance prescription refill requests (narcotic refills) that need to be picked up at our office, we ask your cooperation by providing us with at least 72 hours (3days) notice that you will need a refill. We will not refill narcotic prescription refill requests after 4:00pm on any weekday, Monday through Thursday, or after 2:00pm on Fridays, or on the weekends. We encourage everyone to explore another way of getting your prescription refill request processed using ReInn[employer]ate, our patient web portal through our electronic medical record system. ReInnervate is an efficient and effective way to communicate your medication request directly to the office and  downloadable as an darrin on your smart phone . ReIn[employer]ate also features a review functionality that allows you to view your medication list as well as leave messages for your physician. Are you ready to get connected? If so please review the attatched instructions or speak to any of our staff to get you set up right away! Thank you so much for your cooperation. Should you have any questions please contact our Practice Administrator. The Physicians and Staff,  [facility] Please bring all medication bottles, including vitamins, supplements and any over-the-counter medications, to your next office visit. Introducing [facility]! [facility] introduces Myngle patient portal. Now you can access parts of your medical record, email your doctor's office, and request medication refills online. 1. In your internet browser, go to [email or web address] 2. Click on the First Time User? Click Here link in the Sign In box. You will see the New Member Sign Up page. 3. Enter your Myngle Access Code exactly as it appears below. You will not need to use this code after youve completed the sign-up process. If you do not sign up before the expiration date, you must request a new code. · Myngle Access Code: [email or web address] Expires: 10/7/2017  2:09 PM 
 
4. Enter the last four digits of your Social Security Number (xxxx) and Date of Birth (mm/dd/yyyy) as indicated and click Submit. You will be taken to the next sign-up page. 5. Create a Myngle ID. This will be your Myngle login ID and cannot be changed, so think of one that is secure and easy to remember. 6. Create a Myngle password. You can change your password at any time. 7. Enter your Password Reset Question and Answer. This can be used at a later time if you forget your password. 8. Enter your e-mail address. You will receive e-mail notification when new information is available in [address]. 9. Click Sign Up. You can now view and download portions of your medical record. 10. Click the Download Summary menu link to download a portable copy of your medical information. If you have questions, please visit the Frequently Asked Questions section of the Myngle website.  Remember, Myngle is NOT to be used for urgent needs. For medical emergencies, dial 911. Now available from your iPhone and Android! Please provide this summary of care documentation to your next provider. Your primary care clinician is listed as NONE. If you have any questions after today's visit, please call [phone].

## 2017-08-30 ENCOUNTER — TELEPHONE (OUTPATIENT)
Dept: NEUROLOGY | Age: 32
End: 2017-08-30

## 2017-08-30 NOTE — TELEPHONE ENCOUNTER
Copaxone auth good from 6/13/17 - 6/13/18 [place] [facility] [place] [id number]. Patient started on Copaxon 6/30/17. Faxed to [employer] to confirm shipments.

## 2017-10-04 ENCOUNTER — TELEPHONE (OUTPATIENT)
Dept: NEUROLOGY | Age: 32
End: 2017-10-04

## 2017-10-04 NOTE — TELEPHONE ENCOUNTER
Spoke with patient. She stated she is flying out of town and needs a prescription to carry with her Copaxone showing proof of her medication per [employer].

## 2017-10-04 NOTE — TELEPHONE ENCOUNTER
Spoke with patient. She stated she still has the box with the medication sticker on it. Advised to see if that would be sufficient, if not call the office back. Patient was given an opportunity to ask questions, repeated information, and verbalized understanding.

## 2017-10-10 ENCOUNTER — TELEPHONE (OUTPATIENT)
Dept: NEUROLOGY | Age: 32
End: 2017-10-10

## 2017-10-10 NOTE — TELEPHONE ENCOUNTER
Left message for patient call the office back regarding an MS event that Dr. Mira Joshi wanted to share with her.

## 2017-10-10 NOTE — TELEPHONE ENCOUNTER
----- Message from Cuate Rice sent at 10/10/2017  2:11 PM EDT -----  Regarding: Dr Callaway/telephone  Pt p) [phone],[id number] was returning the nurses call.   From about one hour ago

## 2017-10-11 NOTE — TELEPHONE ENCOUNTER
----- Message from Dana Mario sent at 10/11/2017  8:12 AM EDT -----  Regarding: Dr. Chinmay Barillas  Pt states she is returning a call from yesterday. Her contact number is [phone].

## 2017-12-27 ENCOUNTER — OFFICE VISIT (OUTPATIENT)
Dept: NEUROLOGY | Age: 32
End: 2017-12-27

## 2017-12-27 ENCOUNTER — TELEPHONE (OUTPATIENT)
Dept: NEUROLOGY | Age: 32
End: 2017-12-27

## 2017-12-27 VITALS
WEIGHT: 183 LBS | HEART RATE: 82 BPM | BODY MASS INDEX: 32.43 KG/M2 | HEIGHT: 63 IN | RESPIRATION RATE: 18 BRPM | DIASTOLIC BLOOD PRESSURE: 64 MMHG | OXYGEN SATURATION: 99 % | SYSTOLIC BLOOD PRESSURE: 122 MMHG

## 2017-12-27 DIAGNOSIS — G37.3 ACUTE TRANSVERSE MYELITIS (HCC): ICD-10-CM

## 2017-12-27 DIAGNOSIS — G35 MULTIPLE SCLEROSIS (HCC): Primary | ICD-10-CM

## 2017-12-27 NOTE — PATIENT INSTRUCTIONS
[facility]   Statement to Patients  April 1, 2014      In an effort to ensure the large volume of patient prescription refills is processed in the most efficient and expeditious manner, we are asking our patients to assist us by calling your Pharmacy for all prescription refills, this will include also your  Mail Order Pharmacy. The pharmacy will contact our office electronically to continue the refill process. Please do not wait until the last minute to call your pharmacy. We need at least 48 hours (2days) to fill prescriptions. We also encourage you to call your pharmacy before going to  your prescription to make sure it is ready. With regard to controlled substance prescription refill requests (narcotic refills) that need to be picked up at our office, we ask your cooperation by providing us with at least 72 hours (3days) notice that you will need a refill. We will not refill narcotic prescription refill requests after 4:00pm on any weekday, Monday through Thursday, or after 2:00pm on Fridays, or on the weekends. We encourage everyone to explore another way of getting your prescription refill request processed using [employer], our patient web portal through our electronic medical record system. [employer] is an efficient and effective way to communicate your medication request directly to the office and  downloadable as an darrin on your smart phone . [employer] also features a review functionality that allows you to view your medication list as well as leave messages for your physician. Are you ready to get connected? If so please review the attatched instructions or speak to any of our staff to get you set up right away! Thank you so much for your cooperation. Should you have any questions please contact our Practice Administrator.     The Physicians and Staff,  [facility]             Please bring all medication bottles, including vitamins, supplements and any over-the-counter medications, to your next office visit.

## 2017-12-27 NOTE — TELEPHONE ENCOUNTER
Spoke with patient. Informed her that Dr. Vane Dumas ordered her MRI brain and MRI cerv spine that she will need to have done in April. Provided patient with SH[facility]ONDA contact number. Patient was given an opportunity to ask questions, repeated information, and verbalized understanding.

## 2017-12-27 NOTE — PROGRESS NOTES
Neurology Progress Note      HISTORY PROVIDED BY: patient    Chief Complaint:   Chief Complaint   Patient presents with    Multiple Sclerosis     f/u      Subjective:   Pt is a 34 y.o. RH female last seen in clinic on 8/1/17 in f/u for MS diagnosed after presenting with sudden onset of sensory changes in April, 2017,  tingling in bottom of feet that moved up her legs with walking, hands bilaterally in 4th and 5th digits, moving across to other fingers, tingling from bra line down to just above her pubic bone, around to sides and this area feels tight. Sxs improved after course of IV Solumedrol. Started Copaxone 6/30/17. Family h/o MS in her mother. MRI of C and T spine with contrast on 4/15/17 with abnormal signal with enhancement and possible subtle mass effect in the cord at C4. MRI brain w/wo contrast 4/17/17 with multiple T2/Flair hyperintensities in the PV and subcortical WM, per radiology <9 lesions, with enhancing lesion in the anterior right temporal lobe. LP on 4/18/17 with normal OP 21mmHg, CSF studies were normal except +OGB and elevated IgG Index. Exam was unremarkable. Continued Copaxone 40mg three times a week. Plan for monitoring labs at next visit. She returns for f/u. She is doing well. Started Copaxone on 6/30/17, no side effects. Is having mild injection site reaction, feels like lesions are less severe. No new sxs.    Past Medical History:   Diagnosis Date    Multiple sclerosis ([address]       Past Surgical History:   Procedure Laterality Date    HX ROTATOR CUFF REPAIR Right 1999      Social History     Social History    Marital status:      Spouse name: N/A    Number of children: N/A    Years of education: N/A     Occupational History     at [address] [place] [address] History Main Topics    Smoking status: Light Tobacco Smoker    Smokeless tobacco: Never Used      Comment: smokes socially    Alcohol use 1.2 - 1.8 oz/week     2 - 3 Standard drinks or equivalent per week    Drug use: No    Sexual activity: Not on file     Other Topics Concern    Not on file     Social History Narrative    Lives in [place] with      Family History   Problem Relation Age of Onset    MS Mother      Dec 50yo    Other Father      Estranged    No Known Problems Sister     No Known Problems Brother     Other Brother      Hep C         Objective:   Review of Systems : Per HPI, o/w neg    No Known Allergies     Meds:  Outpatient Medications Prior to Visit   Medication Sig Dispense Refill    MULTIVITAMIN PO Take  by mouth.  cholecalciferol (VITAMIN D3) 1,000 unit cap Take  by mouth daily.  SOUR CHERRY EXTRACT PO Take  by mouth.  TURMERIC ROOT EXTRACT PO Take  by mouth.  glatiramer (COPAXONE) 40 mg/mL injection 40 mg by SubCUTAneous route every Sunday, Tuesday, Thursday. 12 Syringe 5    norgestimate-ethinyl estradiol (SPRINTEC, 28,) 0.25-35 mg-mcg tab Take 1 Tab by mouth daily. No facility-administered medications prior to visit. Imaging:  MRI Results (most recent):    Results from Hospital Encounter encounter on 04/17/17   MRI BRAIN W WO CONT   Narrative CLINICAL HISTORY: Cervical cord lesion for intracranial demyelinating process  Abnormal T2 hyperintense focus in the posterior aspect of the cervical cord at  C4. INDICATION: Cervical spinal cord lesion, evaluate for MS.      COMPARISON: MR C-spine 4/15/2017    TECHNIQUE: MR examination of the brain includes axial and sagittal T1  pre-and-post contrast, axial T2, axial FLAIR, axial gradient echo, axial DWI,  coronal T1 postcontrast.    Contrast: The patient was administered gadolinium-based contrast material axial  and coronal T1-weighted postcontrast enhanced imaging was obtained. FINDINGS:   There is an abnormal area of increased T2 signal intensity at the anterior pole  of the right temporal lobe identified on FLAIR imaging.  Postcontrast enhancement  of this lesion, right temporal lobe subcortical white matter. In addition there is an area of abnormal increased T2 signal intensity adjacent  to the posterior horn of the left lateral ventricle which measures approximately  9.5 x 8.5 mm. Additional scattered minimal subcortical foci of increased T2  signal intensity right posterior frontal lobe, left corona radiata. There are  less than 9 supratentorial foci identified. There is no postcontrast  enhancement. There is no diffusion restriction. The C4 lesion is not included on this examination. There is no intracranial mass, hemorrhage or evidence of acute infarction. There is no Chiari or syrinx. The pituitary and infundibulum are grossly  unremarkable. There is no skull base mass. Cerebellopontine angles are grossly  unremarkable. The major intracranial vascular flow-voids are unremarkable. There is no abnormal meningeal enhancement. The cavernous sinuses are symmetric. Optic chiasm and infundibulum grossly unremarkable. Orbits are grossly  symmetric. Dural venous sinuses are grossly patent. The brain architecture is normal. There is no evidence of midline shift or  mass-effect. There are no extra-axial fluid collections. The mastoid air cells and paranasal sinuses are well pneumatized and clear. Impression IMPRESSION:      Scattered foci of increased T2 signal intensity in the periventricular white  matter, subcortical white matter as well. Less than 9 lesions are identified. There is a lesion in the subcortical white matter of the right temporal lobe  anteriorly which demonstrates postcontrast enhancement, this may represent a  focus of active demyelination. Imaging findings are suggestive of demyelinating process.      23X         CT Results (most recent):    Results from Hospital Encounter encounter on 04/09/17   CT HEAD WO CONT   Narrative EXAM:  CT HEAD WO CONT    INDICATION:   Head trauma, closed, mild, GCS >= 13, no risk factors, neuro exam  normal    COMPARISON: None. TECHNIQUE: Unenhanced CT of the head was performed using 5 mm images. Brain and  bone windows were generated. CT dose reduction was achieved through use of a  standardized protocol tailored for this examination and automatic exposure  control for dose modulation. FINDINGS:  There is no extra-axial fluid collection hemorrhage shift or masses her. Ventricles are normal in size and midline. Impression impression: No acute changes.              Reviewed records in [facility] and media tab today    Lab Review   Results for orders placed or performed during the hospital encounter of 04/18/17   CULTURE, CSF W GRAM STAIN   Result Value Ref Range    Special Requests: NO SPECIAL REQUESTS      GRAM STAIN NO WBC'S SEEN      GRAM STAIN NO ORGANISMS SEEN      Culture result: NO GROWTH ON SOLID MEDIA AT 4 DAYS      Culture result: NO GROWTH IN THIO BROTH AT 7 DAYS     PROTHROMBIN TIME + INR   Result Value Ref Range    INR 1.0 0.9 - 1.1      Prothrombin time 9.9 9.0 - 11.1 sec   PTT   Result Value Ref Range    aPTT 25.2 22.1 - 32.5 sec    aPTT, therapeutic range     58.0 - 77.0 SECS   AQUAPORIN-4 RECEPTOR AB   Result Value Ref Range    NMO IgG 1.9 0.0 - 3.0 U/mL   CELL COUNT, CSF   Result Value Ref Range    CSF TUBE NO. 1      CSF COLOR COLORLESS      CSF APPEARANCE CLEAR      CSF RBCS 1 (H) 0 /cu mm    CSF WBCS 1 0 - 5 /cu mm   PROTEIN, CSF   Result Value Ref Range    Tube No. 2      Protein,CSF 27 15 - 45 MG/DL   GLUCOSE, CSF   Result Value Ref Range    Tube No. 2      Glucose,CSF 60 40 - 70 MG/DL   ANGIOTENSIN CONVERTING ENZYME, CSF   Result Value Ref Range    Angio Converting Enz, CSF 1.3 0.0 - 2.5 U/L   CELL COUNT, CSF   Result Value Ref Range    CSF TUBE NO. 4      CSF COLOR COLORLESS      CSF APPEARANCE CLEAR      CSF RBCS 0 0 /cu mm    CSF WBCS 3 0 - 5 /cu mm   MULTIPLE SCLEROSIS PANEL   Result Value Ref Range    IgG, Quant, CSF 1.7 0.0 - 8.6 mg/dL    Albumin, CSF 13 11 - 48 mg/dL    Albumin, serum 4.1 3.5 - 5.5 g/dL    Immunoglobulin G, Qt. 710 700 - 1600 mg/dL    IgG/Alb ratio, CSF 0.13 0.00 - 0.25      CSF IgG Index 0.8 (H) 0.0 - 0.7      IgG Synthesis Rate, CSF 0.7 NEG 9.9 TO +3.3 mg/day    Oligoclonal Bands Comment      CSF/Serum Alb. Index 3 0 - 8          Exam:  Visit Vitals    /64    Pulse 82    Resp 18    Ht 5' 3\" (1.6 m)    Wt 83 kg (183 lb)    SpO2 99%    BMI 32.42 kg/m2     General:  Alert, cooperative, no distress. Head:  Normocephalic, without obvious abnormality, atraumatic. Respiratory:  Heart:   Non labored breathing  Regular rate and rhythm, no murmurs   Neck:      Extremities: Warm, no cyanosis or edema. Pulses: 2+ radial pulses. Neurologic:  MS: Alert and oriented x 4, speech intact. Language intact. Attention and fund of knowledge appropriate. Recent and remote memory intact. Cranial Nerves:  II: visual fields    II: pupils Equal, round, reactive to light   II: optic disc    III,VII: ptosis none   III,IV,VI: extraocular muscles  EOMI, no nystagmus or diplopia   V: facial light touch sensation     VII: facial muscle function   symmetric   VIII: hearing intact   IX: soft palate elevation  normal   XI: trapezius strength     XI: sternocleidomastoid strength    XII: tongue  Midline     Motor: normal bulk and tone, no tremor              Strength: 5/5 throughout, no PD  Sensory:   Coordination: FTN and MIL intact  Gait: normal gait, able to tandem walk  Reflexes: 2+ symmetric, except 1+ left knee       Assessment/Plan   Pt is a 34 y.o. RH female with MS diagnosed after presenting with sudden onset of sensory changes in April, 2017,  tingling in bottom of feet that moved up her legs with walking, hands bilaterally in 4th and 5th digits, moving across to other fingers, tingling from bra line down to just above her pubic bone, around to sides and this area feels tight. Sxs improved after course of IV Solumedrol.  Started Copaxone 6/30/17 with injection site reaction that is not bothering her currently. Family h/o MS in her mother. MRI of C and T spine with contrast on 4/15/17 with abnormal signal with enhancement and possible subtle mass effect in the cord at C4. MRI brain w/wo contrast 4/17/17 with multiple T2/Flair hyperintensities in the PV and subcortical WM, per radiology <9 lesions, with enhancing lesion in the anterior right temporal lobe. Exam is unremarkable. Continue Copaxone 40mg three times a week. CMP, CBC with diff, and TSH ordered to monitor for toxicity on Copaxone. Repeat MRI brain and C-spine in April, 2018. F/u in clinic in six months, instructed to call in the interim if needed. ICD-10-CM ICD-9-CM    1. Multiple sclerosis (HCC) C08 856 METABOLIC PANEL, COMPREHENSIVE      CBC WITH AUTOMATED DIFF      TSH 3RD GENERATION   2. Acute transverse myelitis (HCC) X73.5 715.19 METABOLIC PANEL, COMPREHENSIVE      CBC WITH AUTOMATED DIFF      TSH 3RD GENERATION       Signed:   Arpit De La Rosa MD  12/27/2017

## 2017-12-27 NOTE — MR AVS SNAPSHOT
Visit Information Date & Time Provider Department Dept. Phone Encounter #  
 12/27/2017  8:40 AM Kana Manley MD [facility] at [address] [id number] Follow-up Instructions Return in about 6 months (around 6/27/2018). Upcoming Health Maintenance Date Due Pneumococcal 19-64 Medium Risk (1 of 1 - PPSV23) 1/8/2003 DTaP/Tdap/Td series (1 - Tdap) 1/8/2005 PAP AKA CERVICAL CYTOLOGY 1/8/2005 Influenza Age 5 to Adult 8/1/2017 Allergies as of 12/27/2017  Review Complete On: 12/27/2017 By: Kana Manley MD  
 No Known Allergies Current Immunizations  Reviewed on 4/19/2017 No immunizations on file. Not reviewed this visit You Were Diagnosed With   
  
 Codes Comments Multiple sclerosis ([facility]    -  Primary ICD-10-CM: G35 
ICD-9-CM: 456 Acute transverse myelitis ([facility]     ICD-10-CM: G37.3 ICD-9-CM: 341.20 Vitals BP Pulse Resp Height(growth percentile) Weight(growth percentile) SpO2  
 122/64 82 18 5' 3\" (1.6 m) 183 lb (83 kg) 99% BMI OB Status Smoking Status 32.42 kg/m2 Having regular periods Light Tobacco Smoker Vitals History BMI and BSA Data Body Mass Index Body Surface Area  
 32.42 kg/m 2 1.92 m 2 Preferred Pharmacy Pharmacy Name Phone [id number] Gertrude Rincon, [address] [facility] [phone] Your Updated Medication List  
  
   
This list is accurate as of: 12/27/17  8:50 AM.  Always use your most recent med list.  
  
  
  
  
 glatiramer 40 mg/mL injection Commonly known as:  COPAXONE 40 mg by SubCUTAneous route every Sunday, Tuesday, Thursday. MULTIVITAMIN PO Take  by mouth. SOUR CHERRY EXTRACT PO Take  by mouth. [facility] (73) 0.25-35 mg-mcg Tab Generic drug:  norgestimate-ethinyl estradiol Take 1 Tab by mouth daily. TURMERIC ROOT EXTRACT PO Take  by mouth. VITAMIN D3 1,000 unit Cap Generic drug:  cholecalciferol Take  by mouth daily. We Performed the Following CBC WITH AUTOMATED DIFF [70443 CPT(R)] METABOLIC PANEL, COMPREHENSIVE [13202 CPT(R)] TSH 3RD GENERATION [91049 CPT(R)] Follow-up Instructions Return in about 6 months (around 6/27/2018). Patient Instructions PRESCRIPTION REFILL POLICY [facility] Statement to Patients April 1, 2014 In an effort to ensure the large volume of patient prescription refills is processed in the most efficient and expeditious manner, we are asking our patients to assist us by calling your Pharmacy for all prescription refills, this will include also your  Mail Order Pharmacy. The pharmacy will contact our office electronically to continue the refill process. Please do not wait until the last minute to call your pharmacy. We need at least 48 hours (2days) to fill prescriptions. We also encourage you to call your pharmacy before going to  your prescription to make sure it is ready. With regard to controlled substance prescription refill requests (narcotic refills) that need to be picked up at our office, we ask your cooperation by providing us with at least 72 hours (3days) notice that you will need a refill. We will not refill narcotic prescription refill requests after 4:00pm on any weekday, Monday through Thursday, or after 2:00pm on Fridays, or on the weekends. We encourage everyone to explore another way of getting your prescription refill request processed using [employer], our patient web portal through our electronic medical record system. [employer] is an efficient and effective way to communicate your medication request directly to the office and  downloadable as an darrin on your smart phone .  [employer] also features a review functionality that allows you to view your medication list as well as leave messages for your physician. Are you ready to get connected? If so please review the attatched instructions or speak to any of our staff to get you set up right away! Thank you so much for your cooperation. Should you have any questions please contact our Practice Administrator. The Physicians and Staff,  [facility] Please bring all medication bottles, including vitamins, supplements and any over-the-counter medications, to your next office visit. Introducing [facility]! Juancarloseric Mercado introduces [employer] patient portal. Now you can access parts of your medical record, email your doctor's office, and request medication refills online. 1. In your internet browser, go to [email or web address] 2. Click on the First Time User? Click Here link in the Sign In box. You will see the New Member Sign Up page. 3. Enter your [employer] Access Code exactly as it appears below. You will not need to use this code after youve completed the sign-up process. If you do not sign up before the expiration date, you must request a new code. · [employer] Access Code: [email or web address] Expires: 3/27/2018  8:31 AM 
 
4. Enter the last four digits of your Social Security Number (xxxx) and Date of Birth (mm/dd/yyyy) as indicated and click Submit. You will be taken to the next sign-up page. 5. Create a [employer] ID. This will be your [employer] login ID and cannot be changed, so think of one that is secure and easy to remember. 6. Create a [employer] password. You can change your password at any time. 7. Enter your Password Reset Question and Answer. This can be used at a later time if you forget your password. 8. Enter your e-mail address. You will receive e-mail notification when new information is available in [address] 9. Click Sign Up. You can now view and download portions of your medical record. 10. Click the Download Summary menu link to download a portable copy of your medical information. If you have questions, please visit the Frequently Asked Questions section of the [employer] website. Remember, [employer] is NOT to be used for urgent needs. For medical emergencies, dial 911. Now available from your iPhone and Android! Please provide this summary of care documentation to your next provider. Your primary care clinician is listed as NONE. If you have any questions after today's visit, please call [phone].

## 2017-12-28 LAB
ALBUMIN SERPL-MCNC: 4.5 G/DL (ref 3.5–5.5)
ALBUMIN/GLOB SERPL: 2.3 {RATIO} (ref 1.2–2.2)
ALP SERPL-CCNC: 33 IU/L (ref 39–117)
ALT SERPL-CCNC: 19 IU/L (ref 0–32)
AST SERPL-CCNC: 16 IU/L (ref 0–40)
BASOPHILS # BLD AUTO: 0 X10E3/UL (ref 0–0.2)
BASOPHILS NFR BLD AUTO: 0 %
BILIRUB SERPL-MCNC: 0.2 MG/DL (ref 0–1.2)
BUN SERPL-MCNC: 6 MG/DL (ref 6–20)
BUN/CREAT SERPL: 9 (ref 9–23)
CALCIUM SERPL-MCNC: 9.6 MG/DL (ref 8.7–10.2)
CHLORIDE SERPL-SCNC: 101 MMOL/L (ref 96–106)
CO2 SERPL-SCNC: 24 MMOL/L (ref 18–29)
CREAT SERPL-MCNC: 0.68 MG/DL (ref 0.57–1)
EOSINOPHIL # BLD AUTO: 0.1 X10E3/UL (ref 0–0.4)
EOSINOPHIL NFR BLD AUTO: 2 %
ERYTHROCYTE [DISTWIDTH] IN BLOOD BY AUTOMATED COUNT: 12.4 % (ref 12.3–15.4)
GLOBULIN SER CALC-MCNC: 2 G/DL (ref 1.5–4.5)
GLUCOSE SERPL-MCNC: 85 MG/DL (ref 65–99)
HCT VFR BLD AUTO: 39.4 % (ref 34–46.6)
HGB BLD-MCNC: 13.1 G/DL (ref 11.1–15.9)
IMM GRANULOCYTES # BLD: 0 X10E3/UL (ref 0–0.1)
IMM GRANULOCYTES NFR BLD: 0 %
LYMPHOCYTES # BLD AUTO: 2.4 X10E3/UL (ref 0.7–3.1)
LYMPHOCYTES NFR BLD AUTO: 33 %
MCH RBC QN AUTO: 29.6 PG (ref 26.6–33)
MCHC RBC AUTO-ENTMCNC: 33.2 G/DL (ref 31.5–35.7)
MCV RBC AUTO: 89 FL (ref 79–97)
MONOCYTES # BLD AUTO: 0.4 X10E3/UL (ref 0.1–0.9)
MONOCYTES NFR BLD AUTO: 5 %
NEUTROPHILS # BLD AUTO: 4.2 X10E3/UL (ref 1.4–7)
NEUTROPHILS NFR BLD AUTO: 60 %
PLATELET # BLD AUTO: 199 X10E3/UL (ref 150–379)
POTASSIUM SERPL-SCNC: 4.7 MMOL/L (ref 3.5–5.2)
PROT SERPL-MCNC: 6.5 G/DL (ref 6–8.5)
RBC # BLD AUTO: 4.42 X10E6/UL (ref 3.77–5.28)
SODIUM SERPL-SCNC: 140 MMOL/L (ref 134–144)
TSH SERPL DL<=0.005 MIU/L-ACNC: 1.33 UIU/ML (ref 0.45–4.5)
WBC # BLD AUTO: 7.1 X10E3/UL (ref 3.4–10.8)

## 2017-12-28 NOTE — PROGRESS NOTES
Spoke with patient. Informed her that, per Dr. Vane Dumas, labs look good. Patient was given an opportunity to ask questions, repeated information, and verbalized understanding.

## 2018-02-05 ENCOUNTER — TELEPHONE (OUTPATIENT)
Dept: NEUROLOGY | Age: 33
End: 2018-02-05

## 2018-02-05 NOTE — TELEPHONE ENCOUNTER
Spoke with patient. She stated she feels \"fine\" except for the \"weirdness in my legs\". She got a sinus infection and flu at the same time. She was given antibiotics and Tamiflu on Thursday, 2/1/18. She stated her legs felt restless Wednesday night, 1/31/18. She stated that her legs felt tired over the weekend. She stated that her left leg can't distinguish between cold and hot tempetures. She stated it started around Friday. She can feel touch in both legs. She stated her sinus infection and flu symptoms are improving. Advised will discuss this with another provider, as  Dr. Gretchen Sauceda is out of the office today and will call patient back with her recommendations. She verbalized understanding.

## 2018-02-05 NOTE — TELEPHONE ENCOUNTER
Message from hello: pls cl re: has been getting over the flu-both of her legs have been feeling really weak-lost the sense to feel temperature in the legs.

## 2018-02-06 ENCOUNTER — TELEPHONE (OUTPATIENT)
Dept: NEUROLOGY | Age: 33
End: 2018-02-06

## 2018-02-06 ENCOUNTER — APPOINTMENT (OUTPATIENT)
Dept: MRI IMAGING | Age: 33
End: 2018-02-06
Attending: EMERGENCY MEDICINE
Payer: COMMERCIAL

## 2018-02-06 ENCOUNTER — HOSPITAL ENCOUNTER (OUTPATIENT)
Age: 33
Setting detail: OBSERVATION
Discharge: HOME | End: 2018-02-07
Attending: EMERGENCY MEDICINE | Admitting: FAMILY MEDICINE
Payer: COMMERCIAL

## 2018-02-06 DIAGNOSIS — R20.0 NUMBNESS AND TINGLING OF LEFT LEG: ICD-10-CM

## 2018-02-06 DIAGNOSIS — R20.2 NUMBNESS AND TINGLING OF LEFT LEG: ICD-10-CM

## 2018-02-06 DIAGNOSIS — G37.9 DEMYELINATING DISEASE (HCC): Primary | ICD-10-CM

## 2018-02-06 PROBLEM — G35 MULTIPLE SCLEROSIS EXACERBATION (HCC): Status: ACTIVE | Noted: 2018-02-06

## 2018-02-06 LAB
ALBUMIN SERPL-MCNC: 3.4 G/DL (ref 3.5–5)
ALBUMIN/GLOB SERPL: 0.9 {RATIO} (ref 1.1–2.2)
ALP SERPL-CCNC: 33 U/L (ref 45–117)
ALT SERPL-CCNC: 21 U/L (ref 12–78)
ANION GAP SERPL CALC-SCNC: 7 MMOL/L (ref 5–15)
APPEARANCE UR: ABNORMAL
AST SERPL-CCNC: 8 U/L (ref 15–37)
BACTERIA URNS QL MICRO: NEGATIVE /HPF
BASOPHILS # BLD: 0 K/UL (ref 0–0.1)
BASOPHILS NFR BLD: 0 % (ref 0–1)
BILIRUB SERPL-MCNC: 0.3 MG/DL (ref 0.2–1)
BILIRUB UR QL: NEGATIVE
BUN SERPL-MCNC: 7 MG/DL (ref 6–20)
BUN/CREAT SERPL: 10 (ref 12–20)
CALCIUM SERPL-MCNC: 8.9 MG/DL (ref 8.5–10.1)
CHLORIDE SERPL-SCNC: 104 MMOL/L (ref 97–108)
CO2 SERPL-SCNC: 27 MMOL/L (ref 21–32)
COLOR UR: ABNORMAL
CREAT SERPL-MCNC: 0.69 MG/DL (ref 0.55–1.02)
DIFFERENTIAL METHOD BLD: NORMAL
EOSINOPHIL # BLD: 0 K/UL (ref 0–0.4)
EOSINOPHIL NFR BLD: 1 % (ref 0–7)
EPITH CASTS URNS QL MICRO: ABNORMAL /LPF
ERYTHROCYTE [DISTWIDTH] IN BLOOD BY AUTOMATED COUNT: 11.5 % (ref 11.5–14.5)
GLOBULIN SER CALC-MCNC: 3.9 G/DL (ref 2–4)
GLUCOSE SERPL-MCNC: 86 MG/DL (ref 65–100)
GLUCOSE UR STRIP.AUTO-MCNC: NEGATIVE MG/DL
HCT VFR BLD AUTO: 37.2 % (ref 35–47)
HGB BLD-MCNC: 12.9 G/DL (ref 11.5–16)
HGB UR QL STRIP: ABNORMAL
IMM GRANULOCYTES # BLD: 0 K/UL (ref 0–0.04)
IMM GRANULOCYTES NFR BLD AUTO: 0 % (ref 0–0.5)
KETONES UR QL STRIP.AUTO: NEGATIVE MG/DL
LEUKOCYTE ESTERASE UR QL STRIP.AUTO: NEGATIVE
LYMPHOCYTES # BLD: 1.6 K/UL (ref 0.8–3.5)
LYMPHOCYTES NFR BLD: 31 % (ref 12–49)
MCH RBC QN AUTO: 30.7 PG (ref 26–34)
MCHC RBC AUTO-ENTMCNC: 34.7 G/DL (ref 30–36.5)
MCV RBC AUTO: 88.6 FL (ref 80–99)
MONOCYTES # BLD: 0.2 K/UL (ref 0–1)
MONOCYTES NFR BLD: 5 % (ref 5–13)
NEUTS SEG # BLD: 3.2 K/UL (ref 1.8–8)
NEUTS SEG NFR BLD: 63 % (ref 32–75)
NITRITE UR QL STRIP.AUTO: NEGATIVE
NRBC # BLD: 0 K/UL (ref 0–0.01)
NRBC BLD-RTO: 0 PER 100 WBC
PH UR STRIP: 5.5 [PH] (ref 5–8)
PLATELET # BLD AUTO: 163 K/UL (ref 150–400)
PMV BLD AUTO: 12.5 FL (ref 8.9–12.9)
POTASSIUM SERPL-SCNC: 3.8 MMOL/L (ref 3.5–5.1)
PROT SERPL-MCNC: 7.3 G/DL (ref 6.4–8.2)
PROT UR STRIP-MCNC: NEGATIVE MG/DL
RBC # BLD AUTO: 4.2 M/UL (ref 3.8–5.2)
RBC #/AREA URNS HPF: ABNORMAL /HPF (ref 0–5)
SODIUM SERPL-SCNC: 138 MMOL/L (ref 136–145)
SP GR UR REFRACTOMETRY: 1.02 (ref 1–1.03)
UA: UC IF INDICATED,UAUC: ABNORMAL
UROBILINOGEN UR QL STRIP.AUTO: 0.2 EU/DL (ref 0.2–1)
WBC # BLD AUTO: 5.1 K/UL (ref 3.6–11)
WBC URNS QL MICRO: ABNORMAL /HPF (ref 0–4)

## 2018-02-06 PROCEDURE — 81001 URINALYSIS AUTO W/SCOPE: CPT | Performed by: EMERGENCY MEDICINE

## 2018-02-06 PROCEDURE — 65660000000 HC RM CCU STEPDOWN

## 2018-02-06 PROCEDURE — 36415 COLL VENOUS BLD VENIPUNCTURE: CPT | Performed by: EMERGENCY MEDICINE

## 2018-02-06 PROCEDURE — 99218 HC RM OBSERVATION: CPT

## 2018-02-06 PROCEDURE — 99283 EMERGENCY DEPT VISIT LOW MDM: CPT

## 2018-02-06 PROCEDURE — 80053 COMPREHEN METABOLIC PANEL: CPT | Performed by: EMERGENCY MEDICINE

## 2018-02-06 PROCEDURE — 74011250637 HC RX REV CODE- 250/637: Performed by: FAMILY MEDICINE

## 2018-02-06 PROCEDURE — 85025 COMPLETE CBC W/AUTO DIFF WBC: CPT | Performed by: EMERGENCY MEDICINE

## 2018-02-06 PROCEDURE — A9576 INJ PROHANCE MULTIPACK: HCPCS | Performed by: EMERGENCY MEDICINE

## 2018-02-06 PROCEDURE — 96365 THER/PROPH/DIAG IV INF INIT: CPT

## 2018-02-06 PROCEDURE — 74011000258 HC RX REV CODE- 258: Performed by: EMERGENCY MEDICINE

## 2018-02-06 PROCEDURE — 74011250636 HC RX REV CODE- 250/636: Performed by: EMERGENCY MEDICINE

## 2018-02-06 PROCEDURE — 70553 MRI BRAIN STEM W/O & W/DYE: CPT

## 2018-02-06 RX ORDER — BISMUTH SUBSALICYLATE 262 MG
1 TABLET,CHEWABLE ORAL DAILY
COMMUNITY
End: 2022-01-03

## 2018-02-06 RX ORDER — SODIUM CHLORIDE 9 MG/ML
75 INJECTION, SOLUTION INTRAVENOUS CONTINUOUS
Status: DISCONTINUED | OUTPATIENT
Start: 2018-02-06 | End: 2018-02-07 | Stop reason: HOSPADM

## 2018-02-06 RX ORDER — AMOXICILLIN AND CLAVULANATE POTASSIUM 875; 125 MG/1; MG/1
1 TABLET, FILM COATED ORAL EVERY 12 HOURS
COMMUNITY
End: 2018-02-15 | Stop reason: ALTCHOICE

## 2018-02-06 RX ORDER — ACETAMINOPHEN 325 MG/1
650 TABLET ORAL
Status: DISCONTINUED | OUTPATIENT
Start: 2018-02-06 | End: 2018-02-07 | Stop reason: HOSPADM

## 2018-02-06 RX ORDER — SODIUM CHLORIDE 0.9 % (FLUSH) 0.9 %
5-10 SYRINGE (ML) INJECTION EVERY 8 HOURS
Status: DISCONTINUED | OUTPATIENT
Start: 2018-02-06 | End: 2018-02-07 | Stop reason: HOSPADM

## 2018-02-06 RX ORDER — ACETAMINOPHEN 650 MG/1
650 SUPPOSITORY RECTAL
Status: DISCONTINUED | OUTPATIENT
Start: 2018-02-06 | End: 2018-02-07 | Stop reason: HOSPADM

## 2018-02-06 RX ORDER — SODIUM CHLORIDE 0.9 % (FLUSH) 0.9 %
5-10 SYRINGE (ML) INJECTION AS NEEDED
Status: DISCONTINUED | OUTPATIENT
Start: 2018-02-06 | End: 2018-02-07 | Stop reason: HOSPADM

## 2018-02-06 RX ORDER — FAMOTIDINE 20 MG/1
20 TABLET, FILM COATED ORAL EVERY 12 HOURS
Status: DISCONTINUED | OUTPATIENT
Start: 2018-02-06 | End: 2018-02-07 | Stop reason: HOSPADM

## 2018-02-06 RX ORDER — GLATIRAMER 40 MG/ML
40 INJECTION, SOLUTION SUBCUTANEOUS
COMMUNITY
End: 2018-02-15

## 2018-02-06 RX ORDER — THERA TABS 400 MCG
1 TAB ORAL DAILY
Status: DISCONTINUED | OUTPATIENT
Start: 2018-02-07 | End: 2018-02-07 | Stop reason: HOSPADM

## 2018-02-06 RX ADMIN — GADOTERIDOL 15 ML: 279.3 INJECTION, SOLUTION INTRAVENOUS at 14:09

## 2018-02-06 RX ADMIN — FAMOTIDINE 20 MG: 20 TABLET, FILM COATED ORAL at 23:12

## 2018-02-06 RX ADMIN — Medication 10 ML: at 23:14

## 2018-02-06 RX ADMIN — SODIUM CHLORIDE 1000 MG: 900 INJECTION, SOLUTION INTRAVENOUS at 18:10

## 2018-02-06 NOTE — ED PROVIDER NOTES
Patient is a 28 y.o. female presenting with weakness. Extremity Weakness    Pertinent negatives include no back pain and no neck pain. Pt states that she has had numbness and decreased ability to differentiate between temperature sensations in her left leg since Friday. She denies any known injury, fall or trauma. She gives herself her copaxone injections to treat her MS in the left hip area. She has completed her tamiflu dosing for the flu this morning and is still taking ceftin for URI symptoms. Skin integrity is intact. There is no obvious deformity, bruising or erythema. Good neurovascular sensation. No apparent tendon or nerve injury. She denies any c/o pain. Hand-eye coordination is intact; normal reflexes; normal gait. She has not had any medications today prior to arrival.      Past Medical History:   Diagnosis Date    Multiple sclerosis ([address]     LP on 4/18/17 with normal OP 21mmHg, CSF studies were normal except +OGB and elevated IgG Index. Past Surgical History:   Procedure Laterality Date    HX ROTATOR CUFF REPAIR Right 1999         Family History:   Problem Relation Age of Onset    MS Mother      Dec 50yo    Other Father      Estranged    No Known Problems Sister     No Known Problems Brother     Other Brother      Hep C       Social History     Social History    Marital status:      Spouse name: N/A    Number of children: N/A    Years of education: N/A     Occupational History     at [address] [place] [address] History Main Topics    Smoking status: Never Smoker    Smokeless tobacco: Never Used      Comment: smokes socially    Alcohol use 1.2 - 1.8 oz/week     2 - 3 Standard drinks or equivalent per week    Drug use: No    Sexual activity: Not on file     Other Topics Concern    Not on file     Social History Narrative    Lives in [place] with          ALLERGIES: Review of patient's allergies indicates no known allergies.     Review of Systems   Constitutional: Negative for activity change and appetite change. HENT: Negative for facial swelling, sore throat and trouble swallowing. Eyes: Negative. Respiratory: Negative for shortness of breath. Cardiovascular: Negative. Gastrointestinal: Negative for abdominal pain, diarrhea and vomiting. Genitourinary: Negative for dysuria. Musculoskeletal: Negative for back pain and neck pain. Skin: Negative for color change. Neurological: Positive for weakness. Negative for headaches. Psychiatric/Behavioral: Negative. Vitals:    02/06/18 1105   BP: 141/85   Pulse: 87   Resp: 18   Temp: 98.4 °F (36.9 °C)   SpO2: 99%   Weight: 80 kg (176 lb 6.4 oz)   Height: 5' 3.5\" (1.613 m)            Physical Exam   Constitutional: She is oriented to person, place, and time. She appears well-nourished. White female, former smoker   HENT:   Head: Normocephalic. Right Ear: External ear normal.   Left Ear: External ear normal.   Mouth/Throat: Oropharynx is clear and moist.   Eyes: Pupils are equal, round, and reactive to light. Neck: Normal range of motion. Neck supple. Cardiovascular: Normal rate and regular rhythm. Pulmonary/Chest: Effort normal and breath sounds normal.   Abdominal: Soft. Bowel sounds are normal.   Musculoskeletal: Normal range of motion. She exhibits no edema, tenderness or deformity. Reports left leg numbness/ decreased ability to differentiate temperature; Skin integrity is intact. There is no obvious deformity, swelling, bruising or erythema. Good vascular sensation. No apparent tendon or nerve injury. Lymphadenopathy:     She has no cervical adenopathy. Neurological: She is alert and oriented to person, place, and time. Skin: Skin is warm and dry. Nursing note and vitals reviewed. [facility]      ED Course       Procedures    Consult neurology  (/covering for Dr. Odalys Jones) recommends MRI.  MRI results reveal increased number of intra-atial lesions with progression of multiple sclerosis plagues. Dr. Vania Hinds recommends admission by the hospitalist for 3 days high dose IV solumedrol; she will see in consult. Consult hospitalist (Dr. Brandon Velasquez) will admit   6:26 PM  Patient's results have been reviewed with her. Patient has verbally conveyed her understanding and agreement of her signs, symptoms, diagnosis, treatment and prognosis and additionally agrees to be admitted. Pablo Sweet NP  Discussed plan of care with Dr. Elijah Hernandez.  Pablo Sweet NP

## 2018-02-06 NOTE — ED NOTES
Assumed care of patient. Verbal and bedside report received from \[facility]\"". Patient resting quietly on stretcher, visitor at bedside. Pt appears in no acute distress, respirations equal and unlabored. VS WNL. Call bell within reach.

## 2018-02-06 NOTE — TELEPHONE ENCOUNTER
If she is feeling any worse this morning I need her to come to the emergency room for further evaluation.

## 2018-02-06 NOTE — Clinical Note
Status[de-identified] Inpatient [101] Type of Bed: Neuro/Stroke [9] Inpatient Hospitalization Certified Necessary for the Following Reasons: 9. Other (further clarification in H&P documentation) Admitting Diagnosis: Multiple sclerosis exacerbation ([facility] [[id number]] Admitting Physician: Naty Corona [[id number]] Attending Physician: Keyana Aguilera Estimated Length of Stay: 3-4 Midnights Discharge Plan[de-identified] Home with Office Follow-up

## 2018-02-06 NOTE — PROGRESS NOTES
Admission Medication Reconciliation:    Information obtained from: patient, RX Query    Significant PMH/Disease States:   Past Medical History:   Diagnosis Date    Multiple sclerosis ([address]     LP on 17 with normal OP 21mmHg, CSF studies were normal except +OGB and elevated IgG Index. Chief Complaint for this Admission:    Chief Complaint   Patient presents with    Extremity Weakness        Allergies:  Review of patient's allergies indicates no known allergies. Prior to Admission Medications:   Prior to Admission Medications   Prescriptions Last Dose Informant Patient Reported? Taking? TURMERIC ROOT EXTRACT PO   Yes Yes   Sig: Take 1 Cap by mouth daily. amoxicillin-clavulanate (AUGMENTIN) 875-125 mg per tablet   Yes Yes   Sig: Take 1 Tab by mouth every twelve (12) hours. cholecalciferol (VITAMIN D3) 1,000 unit cap   Yes Yes   Sig: Take 1,000 Units by mouth daily. glatiramer (COPAXONE) 40 mg/mL injection 2018 at Unknown time  Yes Yes   Si mg by SubCUTAneous route every Monday, Wednesday, Friday. multivitamin (ONE A DAY) tablet   Yes Yes   Sig: Take 1 Tab by mouth daily. norgestimate-ethinyl estradiol ([facility] [place] [address],) 0.25-35 mg-mcg tab   Yes Yes   Sig: Take 1 Tab by mouth daily. sour cherry extract 1,000 mg cap   Yes Yes   Sig: Take 1 Cap by mouth daily. Facility-Administered Medications: None         Comments/Recommendations: All medications/allergies have been reviewed and updated. Confirmed NKDA. Patient recently completed a 5 day treatment course of Tamiflu this morning (18). She is also taking Augmentin 875/125 mg q12h x 10 days (started 18) that will be completed 18.      Changes made to Prior to Admission (PTA) Medication List:     Medications Added:  - Augmentin 875/125 mg q12h x 10 days      Medications Changed:  - Copaxone changed from 40 mg every SUN, TUES, THURS to 40 mg every MON, WED, FRI     Medications Removed:  - None        Thank you for allowing me to participate in the care of this patient. Please contact the medication reconciliation pharmacist () with any questions.      Cara Hatch, PharmD Candidate Class of [id number]

## 2018-02-06 NOTE — ED TRIAGE NOTES
Triage note: Patient c/o no temperature sensation in LLE beginning Friday. Patient reports bilateral leg weakness as well beginning Friday. Patient has MS, referred by neurology for further evaluation.

## 2018-02-06 NOTE — TELEPHONE ENCOUNTER
----- Message from Miles Teran sent at 2/6/2018  8:24 AM EST -----  Regarding: Dr. Alma Gray  Pt returned nurse call from this  morning. Best contact number [phone].

## 2018-02-06 NOTE — TELEPHONE ENCOUNTER
See phone note dated 2/5/18. Spoke with patient. She stated her symptoms are the same as yesterday without any worsening or improving changes. She stated her legs feel weak and felt the symptoms were annoying more than anything. She stated that she feels her legs are \"dragging\" when she is walking but stated \"it may be all in my head\". Writer informed patient that Dr. Dickson Godinez advised for her to come to the ER for further evaluation. Patient stated she didn't know it was that serious. Writer explained that we want to prevent further complications given that she has Multiple Sclerosis and the flu. Patient was given an opportunity to ask questions, repeated information, and verbalized understanding.

## 2018-02-07 VITALS
DIASTOLIC BLOOD PRESSURE: 89 MMHG | WEIGHT: 176.4 LBS | RESPIRATION RATE: 18 BRPM | BODY MASS INDEX: 30.11 KG/M2 | OXYGEN SATURATION: 99 % | TEMPERATURE: 98.2 F | HEIGHT: 64 IN | HEART RATE: 60 BPM | SYSTOLIC BLOOD PRESSURE: 111 MMHG

## 2018-02-07 LAB
CHOLEST SERPL-MCNC: 155 MG/DL
ERYTHROCYTE [DISTWIDTH] IN BLOOD BY AUTOMATED COUNT: 11.3 % (ref 11.5–14.5)
EST. AVERAGE GLUCOSE BLD GHB EST-MCNC: 103 MG/DL
HBA1C MFR BLD: 5.2 % (ref 4.2–6.3)
HCT VFR BLD AUTO: 36.5 % (ref 35–47)
HDLC SERPL-MCNC: 54 MG/DL
HDLC SERPL: 2.9 {RATIO} (ref 0–5)
HGB BLD-MCNC: 12.7 G/DL (ref 11.5–16)
LDLC SERPL CALC-MCNC: 85.8 MG/DL (ref 0–100)
LIPID PROFILE,FLP: NORMAL
MCH RBC QN AUTO: 30.8 PG (ref 26–34)
MCHC RBC AUTO-ENTMCNC: 34.8 G/DL (ref 30–36.5)
MCV RBC AUTO: 88.4 FL (ref 80–99)
NRBC # BLD: 0 K/UL (ref 0–0.01)
NRBC BLD-RTO: 0 PER 100 WBC
PLATELET # BLD AUTO: 171 K/UL (ref 150–400)
PMV BLD AUTO: 12.7 FL (ref 8.9–12.9)
RBC # BLD AUTO: 4.13 M/UL (ref 3.8–5.2)
TRIGL SERPL-MCNC: 76 MG/DL (ref ?–150)
VLDLC SERPL CALC-MCNC: 15.2 MG/DL
WBC # BLD AUTO: 4.3 K/UL (ref 3.6–11)

## 2018-02-07 PROCEDURE — G8980 MOBILITY D/C STATUS: HCPCS

## 2018-02-07 PROCEDURE — 74011250637 HC RX REV CODE- 250/637: Performed by: FAMILY MEDICINE

## 2018-02-07 PROCEDURE — G8978 MOBILITY CURRENT STATUS: HCPCS

## 2018-02-07 PROCEDURE — 83036 HEMOGLOBIN GLYCOSYLATED A1C: CPT | Performed by: FAMILY MEDICINE

## 2018-02-07 PROCEDURE — 99218 HC RM OBSERVATION: CPT

## 2018-02-07 PROCEDURE — 74011250636 HC RX REV CODE- 250/636: Performed by: FAMILY MEDICINE

## 2018-02-07 PROCEDURE — 74011000258 HC RX REV CODE- 258: Performed by: FAMILY MEDICINE

## 2018-02-07 PROCEDURE — 97161 PT EVAL LOW COMPLEX 20 MIN: CPT

## 2018-02-07 PROCEDURE — 80061 LIPID PANEL: CPT | Performed by: FAMILY MEDICINE

## 2018-02-07 PROCEDURE — G8979 MOBILITY GOAL STATUS: HCPCS

## 2018-02-07 PROCEDURE — 36415 COLL VENOUS BLD VENIPUNCTURE: CPT | Performed by: FAMILY MEDICINE

## 2018-02-07 PROCEDURE — 85027 COMPLETE CBC AUTOMATED: CPT | Performed by: FAMILY MEDICINE

## 2018-02-07 PROCEDURE — 96366 THER/PROPH/DIAG IV INF ADDON: CPT

## 2018-02-07 RX ORDER — PREDNISONE 10 MG/1
TABLET ORAL
Qty: 60 TAB | Refills: 0 | Status: SHIPPED | OUTPATIENT
Start: 2018-02-07 | End: 2018-03-12 | Stop reason: SDUPTHER

## 2018-02-07 RX ADMIN — SODIUM CHLORIDE 1000 MG: 900 INJECTION, SOLUTION INTRAVENOUS at 09:54

## 2018-02-07 RX ADMIN — FAMOTIDINE 20 MG: 20 TABLET, FILM COATED ORAL at 10:13

## 2018-02-07 RX ADMIN — THERA TABS 1 TABLET: TAB at 10:13

## 2018-02-07 NOTE — CONSULTS
NEUROLOGY CONSULT NOTE    Name Dominique Buchanan Age 28 y.o. MRN [id number]  1985     Consulting Physician: Lavon Conklin MD      Chief Complaint:  MS, LLE numbness     Assessment:     · Active Problems:  ·   Multiple sclerosis exacerbation ([address] (2018)  ·   ·   28year old female with a h/o MS on Copaxone followed by Dr. Christiano Reyes presenting with subacute onset LLE numbness, slight gait instability, mild LE subjective weakness. Neurological examination today is non-focal apart from decreased temperature sensation LLE. Recent dx of flu and sinusitis which appear resolved presently. MRI Brain reviewed with increasing foci of T2 hyperintensities, progressive disease with evidence of active demyelination. She is completing her second infusion of IVMP presently and is anxious to be discharged today. As her present sx are limited to sensory disturbance and gait is stable, she may be discharged today with steroid taper. Recommendations:   D/C today with f/u Dr. Christiano Reyes 2 weeks  Prednisone 60mg on discharge with tapering dose of 10mg q3 days until discontinued. Please call if further questions/concerns    Thank you very much for this referral. I appreciate the opportunity to participate in this patient's care. History of Present Illness: This is a 28 y.o.   female, we were asked to see for MS, new onset LLE numbness. She is followed by Dr. Christiano Reyes for her MS dx 2017 on Copaxone 3x/weekly injections. She reports compliance with medications. She was diagnosed with flu 10 days ago as well as sinusitis. She noted b/l LE mild weakness 4 days ago with mild difficulty ambulating/imbalance as well as numbness of the LLE. She underwent MRI Brain WWO in ED showing increasing T2 hyperintensities with subtle focal enhancement. No significant weakness today but endorsing ongoing paresthesias.   She has received IVMP x 1 17 with second infusion this AM.  She is anxious to be discharged today. No Known Allergies     Prior to Admission medications    Medication Sig Start Date End Date Taking? Authorizing Provider   multivitamin (ONE A DAY) tablet Take 1 Tab by mouth daily. Yes Historical Provider   sour cherry extract 1,000 mg cap Take 1 Cap by mouth daily. Yes Historical Provider   glatiramer (COPAXONE) 40 mg/mL injection 40 mg by SubCUTAneous route every Monday, Wednesday, Friday. Yes Historical Provider   amoxicillin-clavulanate (AUGMENTIN) 875-125 mg per tablet Take 1 Tab by mouth every twelve (12) hours. Yes Historical Provider   cholecalciferol (VITAMIN D3) 1,000 unit cap Take 1,000 Units by mouth daily. Yes Historical Provider   TURMERIC ROOT EXTRACT PO Take 1 Cap by mouth daily. Yes Historical Provider   norgestimate-ethinyl estradiol ([facility], ,) 0.25-35 mg-mcg tab Take 1 Tab by mouth daily. Yes Usama Grace, MD       Past Medical History:   Diagnosis Date    Multiple sclerosis [address]     LP on 4/18/17 with normal OP 21mmHg, CSF studies were normal except +OGB and elevated IgG Index. Past Surgical History:   Procedure Laterality Date    HX ROTATOR CUFF REPAIR Right 1999        Social History   Substance Use Topics    Smoking status: Never Smoker    Smokeless tobacco: Never Used      Comment: smokes socially    Alcohol use 1.2 - 1.8 oz/week     2 - 3 Standard drinks or equivalent per week        Family History   Problem Relation Age of Onset    MS Mother      Dec 50yo    Other Father      Estranged    No Known Problems Sister     No Known Problems Brother     Other Brother      Hep C        Review of Systems:   Comprehensive review of systems performed and negative except for as listed above.      Exam:     Visit Vitals    /89 (BP 1 Location: Left arm, BP Patient Position: At rest)    Pulse 60    Temp 98.2 °F (36.8 °C)    Resp 18    Ht 5' 3.5\" (1.613 m)    Wt 80 kg (176 lb 6.4 oz)    LMP 02/06/2018    SpO2 99%    BMI 30.76 kg/m2 General: Well developed, well nourished. Patient in no apparent distress   Head: Normocephalic, atraumatic, anicteric sclera   Lungs:  Clear to auscultation bilaterally, No wheezes or rubs   Cardiac: Regular rate and rhythm with no murmurs. Abd: Bowel sounds were audible. No tenderness on palpation   Ext: No pedal edema   Skin: No overt signs of rash     Neurological Exam:  Mental Status: Alert and oriented to person place and time   Speech: Fluent no aphasia or dysarthria. Cranial Nerves:   Intact visual fields. Facial sensation is normal. Facial movement is symmetric. Palate is midline. Normal sternocleidomastoid strength. Tongue is midline. Hearing is intact bilaterally. Eyes: PERRL, EOM's full, no nystagmus, no ptosis. Motor:  Full and symmetric strength of upper and lower proximal and distal muscles. Normal bulk and tone. Reflexes:   Deep tendon reflexes 2+/4 and symmetrical.  Plantar response is downgoing b/l. Sensory:   Decreased temperature sensation LLE   Gait:  Gait is balanced   Tremor:   No tremor noted. Cerebellar:  Finger to nose and heel over shin to knee was demonstrated competently. Neurovascular: No carotid bruits. No JVD       Imaging  CT Results (maximum last 3): Results from [place] encounter on 04/09/17   CT HEAD WO CONT   Narrative EXAM:  CT HEAD WO CONT    INDICATION:   Head trauma, closed, mild, GCS >= 13, no risk factors, neuro exam  normal    COMPARISON: None. TECHNIQUE: Unenhanced CT of the head was performed using 5 mm images. Brain and  bone windows were generated. CT dose reduction was achieved through use of a  standardized protocol tailored for this examination and automatic exposure  control for dose modulation. FINDINGS:  There is no extra-axial fluid collection hemorrhage shift or masses her. Ventricles are normal in size and midline. Impression impression: No acute changes.             MRI Results (maximum last 3):    Results from Hospital Encounter encounter on 02/06/18   MRI BRAIN W WO CONT   Narrative INDICATION: Concern for MS exacerbatio ; patient presents to emergency  department with bilateral leg weakness as well as numbness and lack of  temperature sensation in left lower extremity since Friday. Patient has multiple  sclerosis. COMPARISON: MRI 4/17/2017    EXAM: Sagittal T1-weighted spin-echo and FLAIR, axial FLAIR and T2-weighted fast  spin-echo, axial diffusion weighted echo planar, axial T1-weighted spin-echo,  axial gradient echo, and post IV contrast-enhanced axial and coronal T1-weighted  spin-echo MR images of the brain are obtained. A total of 15 cc intravenous  ProHance was administered for the study. FINDINGS: There are new foci of diffusion abnormality in the right cerebrum  adjacent to the frontal horn of the right lateral ventricle, in the centrum  semiovale bilaterally, and the right criss. There are subtle foci of intra-axial  enhancement in the cerebrum, adjacent to the superior margin of the frontal horn  of the right lateral ventricle and adjacent to the atria of the lateral  ventricles bilaterally. There is substantial increase in the number of areas of  signal abnormality within the brain are including in the superior frontal and  parietal lobes bilaterally, the right corona radiata, the frontal and atrial  periventricular regions bilaterally, the periventricular region of the right  temporal lobe, and the right criss. The ventricles and cortical sulci remain  normal in size and contour. No intra-axial or extra-axial mass effect is shown. The vascular flow voids at the base the brain are normal in conspicuity. Sella,  optic chiasm, and paranasal sinuses remain normal.         Impression IMPRESSION: Increased number of intra-axial lesions consistent with progression  of multiple sclerosis plaques. Several tiny foci of enhancement demonstrated.         Results from [facility] encounter on 04/17/17   MRI BRAIN W WO CONT   Narrative CLINICAL HISTORY: Cervical cord lesion for intracranial demyelinating process  Abnormal T2 hyperintense focus in the posterior aspect of the cervical cord at  C4. INDICATION: Cervical spinal cord lesion, evaluate for MS.      COMPARISON: MR C-spine 4/15/2017    TECHNIQUE: MR examination of the brain includes axial and sagittal T1  pre-and-post contrast, axial T2, axial FLAIR, axial gradient echo, axial DWI,  coronal T1 postcontrast.    Contrast: The patient was administered gadolinium-based contrast material axial  and coronal T1-weighted postcontrast enhanced imaging was obtained. FINDINGS:   There is an abnormal area of increased T2 signal intensity at the anterior pole  of the right temporal lobe identified on FLAIR imaging. Postcontrast enhancement  of this lesion, right temporal lobe subcortical white matter. In addition there is an area of abnormal increased T2 signal intensity adjacent  to the posterior horn of the left lateral ventricle which measures approximately  9.5 x 8.5 mm. Additional scattered minimal subcortical foci of increased T2  signal intensity right posterior frontal lobe, left corona radiata. There are  less than 9 supratentorial foci identified. There is no postcontrast  enhancement. There is no diffusion restriction. The C4 lesion is not included on this examination. There is no intracranial mass, hemorrhage or evidence of acute infarction. There is no Chiari or syrinx. The pituitary and infundibulum are grossly  unremarkable. There is no skull base mass. Cerebellopontine angles are grossly  unremarkable. The major intracranial vascular flow-voids are unremarkable. There is no abnormal meningeal enhancement. The cavernous sinuses are symmetric. Optic chiasm and infundibulum grossly unremarkable. Orbits are grossly  symmetric. Dural venous sinuses are grossly patent.     The brain architecture is normal. There is no evidence of midline shift or  mass-effect. There are no extra-axial fluid collections. The mastoid air cells and paranasal sinuses are well pneumatized and clear. Impression IMPRESSION:      Scattered foci of increased T2 signal intensity in the periventricular white  matter, subcortical white matter as well. Less than 9 lesions are identified. There is a lesion in the subcortical white matter of the right temporal lobe  anteriorly which demonstrates postcontrast enhancement, this may represent a  focus of active demyelination. Imaging findings are suggestive of demyelinating process. 23X        Results from [place] encounter on 04/15/17   MRI [facility] SPINE W WO CONT   Narrative INDICATION: Paresthesias of bilateral hands and trunk. EXAM: Sagittal and axial and coronal images were obtained through the thoracic  spine in varying sequences. Intravenous contrast was administered. FINDINGS:    Comparison exam:  None available. Sagittal images demonstrate normal alignment. STIR sequences demonstrate no significant bone marrow edema or compression  deformity. The thoracic spinal cord is within normal limits. Axial images demonstrate no HNP or spinal stenosis. Tiny protrusion in the lower  thoracic spine of doubtful clinical significance. Impression IMPRESSION:    Unremarkable examination of the thoracic spine. Please see MRI report of C-spine same date.           Lab Review  Lab Results   Component Value Date/Time    WBC 4.3 02/07/2018 04:13 AM    HCT 36.5 02/07/2018 04:13 AM    HGB 12.7 02/07/2018 04:13 AM    PLATELET 579 06/31/9975 04:13 AM     Lab Results   Component Value Date/Time    Sodium 138 02/06/2018 11:47 AM    Potassium 3.8 02/06/2018 11:47 AM    Chloride 104 02/06/2018 11:47 AM    CO2 27 02/06/2018 11:47 AM    Glucose 86 02/06/2018 11:47 AM    BUN 7 02/06/2018 11:47 AM    Creatinine 0.69 02/06/2018 11:47 AM    Calcium 8.9 02/06/2018 11:47 AM No components found for: TROPQUANT  No results found for: PATRICIA    Signed:  Jeannine Love.  Avila Lane DO  2/7/2018  10:17 AM

## 2018-02-07 NOTE — DISCHARGE INSTRUCTIONS
Discharge Instructions       PATIENT ID: Marcus Waller  MRN: [id number]   YOB: 1985    DATE OF ADMISSION: 2/6/2018 11:09 AM    DATE OF DISCHARGE: 2/7/2018    PRIMARY CARE PROVIDER: None     ATTENDING PHYSICIAN: Michele Tejada MD  DISCHARGING PROVIDER: Chelsea Casillas MD    To contact this individual call [phone] and ask the  to page. If unavailable ask to be transferred the Adult Hospitalist Department. DISCHARGE DIAGNOSES MS exacerbation     CONSULTATIONS: IP CONSULT TO NEUROLOGY  IP CONSULT TO NEUROLOGY  IP CONSULT TO HOSPITALIST    PROCEDURES/SURGERIES: * No surgery found *    PENDING TEST RESULTS:   At the time of discharge the following test results are still pending: na    FOLLOW UP APPOINTMENTS:   Follow-up Information     Follow up With Details Comments Contact Abran Parks MD In 2 weeks  [address] [facility]  [phone]             ADDITIONAL CARE RECOMMENDATIONS: na    DIET: Regular Diet     ACTIVITY: Activity as tolerated    WOUND CARE: na    EQUIPMENT needed: na      DISCHARGE MEDICATIONS:   See Medication Reconciliation Form    · It is important that you take the medication exactly as they are prescribed. · Keep your medication in the bottles provided by the pharmacist and keep a list of the medication names, dosages, and times to be taken in your wallet. · Do not take other medications without consulting your doctor. NOTIFY YOUR PHYSICIAN FOR ANY OF THE FOLLOWING:   Fever over 101 degrees for 24 hours. Chest pain, shortness of breath, fever, chills, nausea, vomiting, diarrhea, change in mentation, falling, weakness, bleeding. Severe pain or pain not relieved by medications. Or, any other signs or symptoms that you may have questions about.       DISPOSITION:    Home With:   OT  PT  [facility]  RN       SNF/Inpatient Rehab/LTAC   x Independent/assisted living    Hospice    Other:            Signed:   Chelsea Casillas MD  2/7/2018  10:18 AM

## 2018-02-07 NOTE — DISCHARGE SUMMARY
Discharge Summary       PATIENT ID: Kira Buchanan  MRN: [id number]   YOB: 1985    DATE OF ADMISSION: 2/6/2018 11:09 AM    DATE OF DISCHARGE: 2/7/2018   PRIMARY CARE PROVIDER: None     ATTENDING PHYSICIAN: Zi Ng MD   DISCHARGING PROVIDER: Zi Ng MD    To contact this individual call [phone] and ask the  to page. If unavailable ask to be transferred the Adult Hospitalist Department. CONSULTATIONS: IP CONSULT TO NEUROLOGY  IP CONSULT TO NEUROLOGY  IP CONSULT TO HOSPITALIST    PROCEDURES/SURGERIES: * No surgery found *    ADMITTING [address] [place] [address] COURSE:     Admitted with MS exacerbation, MRI  head: IMPRESSION: Increased number of intra-axial lesions consistent with progression  of multiple sclerosis plaques. Several tiny foci of enhancement demonstrated.       Seen by Neurology:  Impression , stable for dc on prednisone taper as pt with improvement after initial steroids iv solumedrol,     Pt to follow up with Dr. Segundo Valdivia in 2 weeks. Case discussed with Azul Tracy DO  Neurology               DISCHARGE DIAGNOSES / PLAN:      1.  ms exacerbation. PENDING TEST RESULTS:   At the time of discharge the following test results are still pending: na    FOLLOW UP APPOINTMENTS:    Follow-up Information     Follow up With Details Comments Roberta Maxwell MD In 2 weeks  [address] [facility]  [phone]             ADDITIONAL CARE RECOMMENDATIONS: na    DIET: Regular Diet     ACTIVITY: Activity as tolerated    WOUND CARE: na    EQUIPMENT needed: na      DISCHARGE MEDICATIONS:  Current Discharge Medication List      START taking these medications    Details   predniSONE (DELTASONE) 10 mg tablet 6 a day for 3 days then 5 a day for 3 days then 4 a day for 3 days then 2 a day for 3 days then 1 a day for 3 days ten 1 /2 a day for 6 days then 1/2 every other day  For 4 dosages.   Indications: MS exacerbation  Qty: 60 Tab, Refills: 0         CONTINUE these medications which have NOT CHANGED    Details   multivitamin (ONE A DAY) tablet Take 1 Tab by mouth daily. sour cherry extract 1,000 mg cap Take 1 Cap by mouth daily. glatiramer (COPAXONE) 40 mg/mL injection 40 mg by SubCUTAneous route every Monday, Wednesday, Friday. amoxicillin-clavulanate (AUGMENTIN) 875-125 mg per tablet Take 1 Tab by mouth every twelve (12) hours. cholecalciferol (VITAMIN D3) 1,000 unit cap Take 1,000 Units by mouth daily. TURMERIC ROOT EXTRACT PO Take 1 Cap by mouth daily. norgestimate-ethinyl estradiol ([facility], ,) 0.25-35 mg-mcg tab Take 1 Tab by mouth daily. NOTIFY YOUR PHYSICIAN FOR ANY OF THE FOLLOWING:   Fever over 101 degrees for 24 hours. Chest pain, shortness of breath, fever, chills, nausea, vomiting, diarrhea, change in mentation, falling, weakness, bleeding. Severe pain or pain not relieved by medications. Or, any other signs or symptoms that you may have questions about.     DISPOSITION:    Home With:   OT  PT  HH  RN       Long term SNF/Inpatient Rehab    Independent/assisted living    Hospice    Other:       PATIENT CONDITION AT DISCHARGE:     Functional status    Poor     Deconditioned    x Independent      Cognition   x  Lucid     Forgetful     Dementia      Catheters/lines (plus indication)    Jimenez     PICC     PEG    x None      Code status    x Full code     DNR      PHYSICAL EXAMINATION AT DISCHARGE:   Refer to Progress Note  Pul; clear  CV: rr no m/g/r  Visit Vitals    /89 (BP 1 Location: Left arm, BP Patient Position: At rest)    Pulse 60    Temp 98.2 °F (36.8 °C)    Resp 18    Ht 5' 3.5\" (1.613 m)    Wt 80 kg (176 lb 6.4 oz)    LMP 02/06/2018    SpO2 99%    BMI 30.76 kg/m2            CHRONIC MEDICAL DIAGNOSES:  Problem List as of 2/7/2018  Date Reviewed: 12/27/2017          Codes Class Noted - Resolved    Multiple sclerosis exacerbation ([facility] ICD-10-CM: G35  ICD-9-CM: 281 2/6/2018 - Present        Demyelinating disease ([facility] ICD-10-CM: G37.9  ICD-9-CM: 341.9  4/18/2017 - Present        Spinal cord lesion [facility]) ICD-10-CM: G95.9  ICD-9-CM: 336.9  4/17/2017 - Present              Greater than  20  minutes were spent with the patient on counseling and coordination of care    Signed:   Aura Paige MD  2/7/2018  10:19 AM

## 2018-02-07 NOTE — ED NOTES
Bedside and Verbal shift change report given to Gauri Penaloza RN (oncoming nurse) by Lanette Sousa RN (offgoing nurse). Report given with SBAR, Kardex, Intake/Output, MAR and Recent Results.

## 2018-02-07 NOTE — H&P
[facility]  ACUTE CARE HISTORY AND PHYSICAL    Mychal Alvarez  MR#: [id number]  : 1985  ACCOUNT #: [de-identified]   DATE OF SERVICE: 2018    CHIEF COMPLAINT:  Left leg numbness. HISTORY OF PRESENT ILLNESS:  The patient is a 70-year-old female with past medical history of multiple sclerosis and history of rotator cuff repair, who presents to the hospital with the above  The patient reports that she takes Copaxone injections on a daily basis. Friday, about 3 days back, she started noticing that her left leg was more numb and that she had \"weird sensations\" in her leg. Patient reports that when she would \"apply a cold pack to her leg, it would feel hot. \"  The patient also reports that she felt that she had a little bit of trouble walking with her left leg, got  concerned and decided to come to the hospital.  Patient reports about a week back she was diagnosed with flu and was started on antibiotics, associated with Tamiflu, which she had just finished. Patient reports that she takes her injections in the left hip and the left thigh area, and she did not notice any redness or tenderness at the injection site since completion of her injections. Patient reports that she does not have any other concerns or problems. The patient was seen in the ER by Dr. Teetee Wild. Patient had an MRI done. Dr. Teetee Wild was consulted and it was felt that she may have increased lesions from MS on the MRI and may be a multiple sclerosis exacerbation and may need IV steroids for the next 3 days and was requested to be admitted under the hospitalist service. Patient reports that after the first dose of steroids she is feeling much better and her symptoms have resolved. The patient denies any other complaints or problems.   Denies any headache, blurry vision, sore throat, swallowing problems, problems with speech, chest pain, shortness of breath, cough, fever, chills, abdominal pain, nausea, vomiting, diarrhea, urinary symptoms, focal or generalized neurological weakness, recent travel, sick contacts, falls, injuries, hematemesis, melena, hemoptysis, or hematochezia. or any other concerns or problems. PAST MEDICAL HISTORY:  See above. HOME MEDICATIONS:  Currently the patient is on vitamin D3; turmeric root extract; Sprintec; multivitamin 1 tablet every day; sour cherry; Copaxone every Monday, Wednesday and Friday; and Augmentin, which the patient has stopped taking now. REVIEW OF SYSTEMS:  All systems were reviewed and they were found to be essentially negative except for symptoms mentioned above. ALLERGIES:  NO KNOWN DRUG ALLERGIES. FAMILY HISTORY:  Mother had history of MS. Father was estranged, so she does not know history. Brother has history of hepatitis C.    SOCIAL HISTORY:  Denies tobacco abuse, occasional alcohol use, denies IV drug abuse. Lives at home. PHYSICAL EXAMINATION   VITAL SIGNS:  Temperature 98.7, pulse  respiration rate 16, blood pressure 113/68, pulse ox 100% on room air. GENERAL:  Alert x3, awake, no acute distress, resting in bed, pleasant female, appears to be stated age. HEENT:  Pupils equal and reactive to light. Dry mucous membranes. Tympanic membranes clear. NECK:  Supple. CHEST:  Clear to auscultation bilaterally. CORONARY:  S1, S2 heard. ABDOMEN:  Soft, nontender, nondistended. Bowel sounds are physiological.  EXTREMITIES:  No clubbing, no cyanosis, no edema. NEUROPSYCHIATRIC:  Pleasant mood and affect. Cranial nerves II-XII grossly intact. Sensory grossly within normal limits. DTRs 2+. Strength 5/5. SKIN:  Warm. LABORATORY DATA:  White count 5.1, hemoglobin 4.20, hematocrit 12.9, platelets 695. Urine shows no signs of infection. Sodium 138, potassium  bicarbonate 27, glucose 86, BUN 7, creatinine 0.69, calcium 8.9, bilirubin total 0.3, ALT 21, AST 8, alkaline phosphatase 33.       MRI of the brain showed increased number of intraaxial lesions consistent with progression of MS plaques with several tiny foci of  demonstrated. ASSESSMENT  1. Multiple sclerosis exacerbation. The patient will be admitted to neurology bed. Will start patient on high-dose IV Solu-Medrol of 1 gram 2 times daily. Also provide neurovascular checks, PT, OT consult. Neurovascular checks, and closely monitor. May consider getting an MRI of the neck. Neurology consult has been requested, and will continue to closely monitor. Further intervention per hospital course. Reassess as needed. 2.  Gastrointestinal and deep venous thrombosis prophylaxis. Patient will be placed on SCDs.       Cookie Galeazzi, MD       MM / PN  D: 02/06/2018 19:02     T: 02/06/2018 20:13  JOB #: [id number]

## 2018-02-07 NOTE — ED NOTES
The documentation for this period is being entered following the guidelines as defined in the [address] downtime policy by Aurea Brownlee RN.

## 2018-02-07 NOTE — PROGRESS NOTES
physical Therapy EVALUATION/DISCHARGE  Patient: Yulisa Chaudhry (38 y.o. female)  Date: 2/7/2018  Primary Diagnosis: Multiple sclerosis exacerbation ([facility] [address]        Precautions:      ASSESSMENT :  Chart reviewed. Patient cleared to be seen by nursing staff. Patient presents to the ED with L leg sensation deficits. Patient has a history of MS, being diagnosed last year. Last flare up in April 2017 with numbness to bilats LEs, bilat UEs and abdomin. Patient is able to detect light touch tioday but describes loss of temperature sensation to the LLE. MRI reveals: Increased number of intra-axial lesions consistent with progression of multiple sclerosis plaques. Patient steady with ambulation without device. Strength equal in LEs bilaterally. 52/56 on Pena balance test indicating low fall risk. Educated patient on PT's role as MS progresses. Patient given a booklet on ways to make home life easier with flare ups. No further acute PT needs at this time. Skilled physical therapy is not indicated at this time. PLAN :  Discharge Recommendations: None, patient given some recourses on MS and Yoga  Further Equipment Recommendations for Discharge: none     SUBJECTIVE:   Patient stated If you put a cold water bottle against my leg, it feels almost hot or not there at all.     OBJECTIVE DATA SUMMARY:   HISTORY:    Past Medical History:   Diagnosis Date    Multiple sclerosis ([facility] [address]     LP on 4/18/17 with normal OP 21mmHg, CSF studies were normal except +OGB and elevated IgG Index. Past Surgical History:   Procedure Laterality Date    HX ROTATOR CUFF REPAIR Right 1999     Prior Level of Function/Home Situation: Lives with . Works full time for an insurance company. Drives. No falls reported. Has recently lost 50 lbs with diet and exercise.    Personal factors and/or comorbidities impacting plan of care:     Home Situation  Home Environment: Private residence  One/Two Story Residence: Two story  Living Alone: No  Support Systems: Spouse/Significant Other/Partner, Friends \ neighbors, Family member(s)  Patient Expects to be Discharged to[de-identified] Private residence  Current DME Used/Available at Home: None    EXAMINATION/PRESENTATION/DECISION MAKING:   Hearing: Auditory  Auditory Impairment: None     Strength:    Strength: Within functional limits     Tone & Sensation:   Tone: Normal  Sensation: Impaired (Temperature sensation impaired)    Coordination:  Coordination: Within functional limits  Functional Mobility:  Bed Mobility:  Supine to Sit: Independent  Sit to Supine: Independent  Transfers:  Sit to Stand: Supervision  Stand to Sit: Supervision  Balance:   Sitting: Intact  Standing: Intact  Ambulation/Gait Training:  Distance (ft): 10 Feet (ft)  Ambulation - Level of Assistance: Supervision  Base of Support: Narrowed    Functional Measure:  Pena Balance Test:    Sitting to Standin  Standing Unsupported: 4  Sitting with Back Unsupported: 4  Standing to Sittin  Transfers: 4  Standing Unsupported with Eyes Closed: 4  Standing Unsupported with Feet Together: 4  Reach Forward with Outstretched Arm: 3   Object: 4  Turn to Look Over Shoulders: 2  Turn 360 Degrees: 4  Alternate Foot on Step/Stool: 4  Standing Unsupported One Foot in Front: 4  Stand on One Leg: 3  Total: 52         56=Maximum possible score;   0-20=High fall risk  21-40=Moderate fall risk   41-56=Low fall risk     Pena Balance Test and G-code impairment scale:  Percentage of Impairment CH    0%   CI    1-19% CJ    20-39% CK    40-59% CL    60-79% CM    80-99% CN     100%   Pena   Score 0-56 56 45-55 34-44 23-33 12-22 1-11 0         G codes: In compliance with CMSs Claims Based Outcome Reporting, the following G-code set was chosen for this patient based on their primary functional limitation being treated:     The outcome measure chosen to determine the severity of the functional limitation was the Ami Door with a score of 52/56 which was correlated with the impairment scale. ? Mobility - Walking and Moving Around:     - CURRENT STATUS: CI - 1%-19% impaired, limited or restricted    - GOAL STATUS: CI - 1%-19% impaired, limited or restricted    - D/C STATUS:  CI - 1%-19% impaired, limited or restricted        Physical Therapy Evaluation Charge Determination   History Examination Presentation Decision-Making   MEDIUM  Complexity : 1-2 comorbidities / personal factors will impact the outcome/ POC  LOW Complexity : 1-2 Standardized tests and measures addressing body structure, function, activity limitation and / or participation in recreation  LOW Complexity : Stable, uncomplicated  LOW Complexity : FOTO score of       Based on the above components, the patient evaluation is determined to be of the following complexity level: LOW     Pain:  Pain Scale 1: Numeric (0 - 10)  Pain Intensity 1: 0     Activity Tolerance:   WFL  Please refer to the flowsheet for vital signs taken during this treatment. After treatment:   []   Patient left in no apparent distress sitting up in chair  [x]   Patient left in no apparent distress in bed  [x]   Call bell left within reach  [x]   Nursing notified  [x]   Caregiver present  []   Bed alarm activated    COMMUNICATION/EDUCATION:   Communication/Collaboration:  [x]   Fall prevention education was provided and the patient/caregiver indicated understanding. [x]   Patient/family have participated as able and agree with findings and recommendations. []   Patient is unable to participate in plan of care at this time.   Findings and recommendations were discussed with: Registered Nurse    Thank you for this referral.  Hui Abel PT, DPT   Time Calculation: 12 mins

## 2018-02-07 NOTE — PROGRESS NOTES
Patient c/o no temperature sensation in LLE beginning Friday. Patient reports bilateral leg weakness as well beginning Friday. Patient has MS, referred by neurology for further evaluation. Last appointment with Dr. Carlos Merritt- Neurology was Dec. 27, 2017. Patient called her office yesterday and the physician on call referred her to ER. There are some changes in Brain MRI but, consistent with MS disease process. Patient will be given IV Solu-Medrol per hospitalist note. Dr. Prem Gill recommends admission by the hospitalist for 3 days high dose IV solumedrol; she will see in consult. Consult hospitalist (Dr. Adelaida Mendez) will admit   Patient works and lives with her  in a two story home with 3 steps and rails to inside. Patients home. Her bedroom is on the second floor which has 15 steps to second floor with hand rails. Patient has not had any falls and does not use any DME. Prescriptions are filled at [facility]. Insurance : [employer] [facility]. Patient does not have a PCP so she was given a list of [employer]. Patient also, wishes to be discharged. She stated that the IV Solu-Medrol will not start to help for several days and she would rather follow-up with her Neurologist. Dr. Prem Gill did come in and was told patients wishes so she can explain to patient if she needs to stay or not. Patient was admitted under Dr. Rhonda Fernando but he has gone for the day. Patient is assigned to Team 3, Dr. Joycelyn Dubose ([phone]) I called him to tell him patients wishes he stated he will be down to see patient shortly. Patient does not have an advanced directive but, she is working on one. She does not wish to see pastoral care at this time. Care Management Interventions  PCP Verified by CM: No  Mode of Transport at Discharge:  Other (see comment) (Car-)  Transition of Care Consult (CM Consult): Discharge Planning  MyChart Signup: No  Discharge Durable Medical Equipment: No  Physical Therapy Consult: Yes  Occupational Therapy Consult: Yes  Speech Therapy Consult: Yes  Current Support Network: Lives with Spouse  Confirm Follow Up Transport: Family ()  Plan discussed with Pt/Family/Caregiver: Yes (Patient and )  Discharge Location  Discharge Placement: Alexia Schaeffer RN CRM

## 2018-02-07 NOTE — PROGRESS NOTES
Speech Pathology    Consult received and appreciated. Chart reviewed. Spoke with RN who deny any concerns re: swallowing or slurred speech. Patient eating a regular diet without concern. No speech or swallowing concerns noted in admission note; patient admitted with left leg numbness. No formal SLP evaluation warranted at this time. Signing off. Norma Concepcion MS, CCC-SLP, BCS-S

## 2018-02-09 ENCOUNTER — TELEPHONE (OUTPATIENT)
Dept: NEUROLOGY | Age: 33
End: 2018-02-09

## 2018-02-09 NOTE — TELEPHONE ENCOUNTER
Left message informing patient will check with Dr. Odalys Jones about scheduling her within the next two weeks and will call patient back. Advised to call back sooner with any questions or concerns.

## 2018-02-12 NOTE — TELEPHONE ENCOUNTER
Spoke with patient and scheduled her for Thursday 2/15/18 at 1 pm. Patient was given an opportunity to ask questions, repeated information, and verbalized understanding.

## 2018-02-15 ENCOUNTER — OFFICE VISIT (OUTPATIENT)
Dept: NEUROLOGY | Age: 33
End: 2018-02-15

## 2018-02-15 ENCOUNTER — DOCUMENTATION ONLY (OUTPATIENT)
Dept: NEUROLOGY | Age: 33
End: 2018-02-15

## 2018-02-15 VITALS
SYSTOLIC BLOOD PRESSURE: 106 MMHG | BODY MASS INDEX: 30.12 KG/M2 | WEIGHT: 170 LBS | HEIGHT: 63 IN | HEART RATE: 92 BPM | RESPIRATION RATE: 18 BRPM | DIASTOLIC BLOOD PRESSURE: 62 MMHG | OXYGEN SATURATION: 97 %

## 2018-02-15 DIAGNOSIS — G35 MULTIPLE SCLEROSIS (HCC): Primary | ICD-10-CM

## 2018-02-15 RX ORDER — LANOLIN ALCOHOL/MO/W.PET/CERES
1000 CREAM (GRAM) TOPICAL DAILY
COMMUNITY
End: 2022-01-03

## 2018-02-15 NOTE — PROGRESS NOTES
Neurology Progress Note      HISTORY PROVIDED BY: patient    Chief Complaint:   Chief Complaint   Patient presents with    Multiple Sclerosis     f/u      Subjective:   Pt is a 28 y.o. RH female last seen in clinic on 12/27/17 in f/u for MS diagnosed after presenting with sudden onset of sensory changes in April, 2017,  tingling in bottom of feet that moved up her legs with walking, hands bilaterally in 4th and 5th digits, moving across to other fingers, tingling from bra line down to just above her pubic bone, around to sides and area felt tight. Sxs improved after course of IV Solumedrol. Started Copaxone 6/30/17 with injection site reaction that was not bothering her. Family h/o MS in her mother. MRI of C and T spine with contrast on 4/15/17 with abnormal signal with enhancement and possible subtle mass effect in the cord at C4. MRI brain w/wo contrast 4/17/17 with multiple T2/Flair hyperintensities in the PV and subcortical WM, per radiology <9 lesions, with enhancing lesion in the anterior right temporal lobe. Exam was unremarkable. Continued Copaxone 40mg three times a week. CMP, CBC with diff, and TSH ordered to monitor for toxicity on Copaxone. Repeat MRI brain and C-spine planned for April, 2018. She returns for earlier than planned f/u. She was admitted to the hospital 2/6/18 - 2/7/18 after presenting with subacute onset LLE numbness, slight gait instability, mild LE subjective weakness. Neurological examination on 2/7/18 by Dr. Salazar Kirby was non-focal apart from decreased temperature sensation LLE. The pt had recently been diagnosed with flu and sinusitis which were resolved at the time of admission. MRI brain w/wo 2/6/18 reviewed in PACS - Increased number of intra-axial lesions consistent with progression of multiple sclerosis plaques. Several tiny foci of enhancement demonstrated. She was given IV Solumedrol x 2 and d/c'd on oral taper.   Monitoring labs in Dec and repeat labs during admission are unremarkable. Sxs improved. Still on prednisone taper. Past Medical History:   Diagnosis Date    Multiple sclerosis ([address]     LP on 4/18/17 with normal OP 21mmHg, CSF studies were normal except +OGB and elevated IgG Index. Past Surgical History:   Procedure Laterality Date    HX ROTATOR CUFF REPAIR Right 1999      Social History     Social History    Marital status:      Spouse name: N/A    Number of children: N/A    Years of education: N/A     Occupational History     at [address] [place] [address] History Main Topics    Smoking status: Never Smoker    Smokeless tobacco: Never Used      Comment: smokes socially    Alcohol use 1.2 - 1.8 oz/week     2 - 3 Standard drinks or equivalent per week    Drug use: No    Sexual activity: Not on file     Other Topics Concern    Not on file     Social History Narrative    Lives in [facility] with      Family History   Problem Relation Age of Onset    MS Mother      Dec 48yo    Other Father      Estranged    No Known Problems Sister     No Known Problems Brother     Other Brother      Hep C         Objective:   Review of Systems : Per HPI, o/w neg    No Known Allergies     Meds:  Outpatient Medications Prior to Visit   Medication Sig Dispense Refill    predniSONE (DELTASONE) 10 mg tablet 6 a day for 3 days then 5 a day for 3 days then 4 a day for 3 days then 2 a day for 3 days then 1 a day for 3 days ten 1 /2 a day for 6 days then 1/2 every other day  For 4 dosages. Indications: MS exacerbation 60 Tab 0    multivitamin (ONE A DAY) tablet Take 1 Tab by mouth daily.  sour cherry extract 1,000 mg cap Take 1 Cap by mouth daily.  glatiramer (COPAXONE) 40 mg/mL injection 40 mg by SubCUTAneous route every Monday, Wednesday, Friday.  cholecalciferol (VITAMIN D3) 1,000 unit cap Take 1,000 Units by mouth daily.  TURMERIC ROOT EXTRACT PO Take 1 Cap by mouth daily.       norgestimate-ethinyl estradiol ([facility], ,) 0.25-35 mg-mcg tab Take 1 Tab by mouth daily.  amoxicillin-clavulanate (AUGMENTIN) 875-125 mg per tablet Take 1 Tab by mouth every twelve (12) hours. No facility-administered medications prior to visit. Imaging:  MRI Results (most recent):    Results from Hospital Encounter encounter on 02/06/18   MRI BRAIN W WO CONT   Narrative INDICATION: Concern for MS exacerbatio ; patient presents to emergency  department with bilateral leg weakness as well as numbness and lack of  temperature sensation in left lower extremity since Friday. Patient has multiple  sclerosis. COMPARISON: MRI 4/17/2017    EXAM: Sagittal T1-weighted spin-echo and FLAIR, axial FLAIR and T2-weighted fast  spin-echo, axial diffusion weighted echo planar, axial T1-weighted spin-echo,  axial gradient echo, and post IV contrast-enhanced axial and coronal T1-weighted  spin-echo MR images of the brain are obtained. A total of 15 cc intravenous  ProHance was administered for the study. FINDINGS: There are new foci of diffusion abnormality in the right cerebrum  adjacent to the frontal horn of the right lateral ventricle, in the centrum  semiovale bilaterally, and the right criss. There are subtle foci of intra-axial  enhancement in the cerebrum, adjacent to the superior margin of the frontal horn  of the right lateral ventricle and adjacent to the atria of the lateral  ventricles bilaterally. There is substantial increase in the number of areas of  signal abnormality within the brain are including in the superior frontal and  parietal lobes bilaterally, the right corona radiata, the frontal and atrial  periventricular regions bilaterally, the periventricular region of the right  temporal lobe, and the right criss. The ventricles and cortical sulci remain  normal in size and contour. No intra-axial or extra-axial mass effect is shown. The vascular flow voids at the base the brain are normal in conspicuity. Sella,  optic chiasm, and paranasal sinuses remain normal.         Impression IMPRESSION: Increased number of intra-axial lesions consistent with progression  of multiple sclerosis plaques. Several tiny foci of enhancement demonstrated. CT Results (most recent):    Results from Hospital Encounter encounter on 04/09/17   CT HEAD WO CONT   Narrative EXAM:  CT HEAD WO CONT    INDICATION:   Head trauma, closed, mild, GCS >= 13, no risk factors, neuro exam  normal    COMPARISON: None. TECHNIQUE: Unenhanced CT of the head was performed using 5 mm images. Brain and  bone windows were generated. CT dose reduction was achieved through use of a  standardized protocol tailored for this examination and automatic exposure  control for dose modulation. FINDINGS:  There is no extra-axial fluid collection hemorrhage shift or masses her. Ventricles are normal in size and midline. Impression impression: No acute changes. Reviewed records in [employer] and [employer] tab today    Lab Review   Results for orders placed or performed during the hospital encounter of 02/06/18   CBC WITH AUTOMATED DIFF   Result Value Ref Range    WBC 5.1 3.6 - 11.0 K/uL    RBC 4.20 3.80 - 5.20 M/uL    HGB 12.9 11.5 - 16.0 g/dL    HCT 37.2 35.0 - 47.0 %    MCV 88.6 80.0 - 99.0 FL    MCH 30.7 26.0 - 34.0 PG    MCHC 34.7 30.0 - 36.5 g/dL    RDW 11.5 11.5 - 14.5 %    PLATELET 643 271 - 208 K/uL    MPV 12.5 8.9 - 12.9 FL    NRBC 0.0 0  WBC    ABSOLUTE NRBC 0.00 0.00 - 0.01 K/uL    NEUTROPHILS 63 32 - 75 %    LYMPHOCYTES 31 12 - 49 %    MONOCYTES 5 5 - 13 %    EOSINOPHILS 1 0 - 7 %    BASOPHILS 0 0 - 1 %    IMMATURE GRANULOCYTES 0 0.0 - 0.5 %    ABS. NEUTROPHILS 3.2 1.8 - 8.0 K/UL    ABS. LYMPHOCYTES 1.6 0.8 - 3.5 K/UL    ABS. MONOCYTES 0.2 0.0 - 1.0 K/UL    ABS. EOSINOPHILS 0.0 0.0 - 0.4 K/UL    ABS. BASOPHILS 0.0 0.0 - 0.1 K/UL    ABS. IMM.  GRANS. 0.0 0.00 - 0.04 K/UL    DF AUTOMATED     METABOLIC PANEL, COMPREHENSIVE Result Value Ref Range    Sodium 138 136 - 145 mmol/L    Potassium 3.8 3.5 - 5.1 mmol/L    Chloride 104 97 - 108 mmol/L    CO2 27 21 - 32 mmol/L    Anion gap 7 5 - 15 mmol/L    Glucose 86 65 - 100 mg/dL    BUN 7 6 - 20 MG/DL    Creatinine 0.69 0.55 - 1.02 MG/DL    BUN/Creatinine ratio 10 (L) 12 - 20      GFR est AA >60 >60 ml/min/1.73m2    GFR est non-AA >60 >60 ml/min/1.73m2    Calcium 8.9 8.5 - 10.1 MG/DL    Bilirubin, total 0.3 0.2 - 1.0 MG/DL    ALT (SGPT) 21 12 - 78 U/L    AST (SGOT) 8 (L) 15 - 37 U/L    Alk.  phosphatase 33 (L) 45 - 117 U/L    Protein, total 7.3 6.4 - 8.2 g/dL    Albumin 3.4 (L) 3.5 - 5.0 g/dL    Globulin 3.9 2.0 - 4.0 g/dL    A-G Ratio 0.9 (L) 1.1 - 2.2     URINALYSIS W/ REFLEX CULTURE   Result Value Ref Range    Color YELLOW/STRAW      Appearance CLOUDY (A) CLEAR      Specific gravity 1.021 1.003 - 1.030      pH (UA) 5.5 5.0 - 8.0      Protein NEGATIVE  NEG mg/dL    Glucose NEGATIVE  NEG mg/dL    Ketone NEGATIVE  NEG mg/dL    Bilirubin NEGATIVE  NEG      Blood MODERATE (A) NEG      Urobilinogen 0.2 0.2 - 1.0 EU/dL    Nitrites NEGATIVE  NEG      Leukocyte Esterase NEGATIVE  NEG      WBC 0-4 0 - 4 /hpf    RBC 0-5 0 - 5 /hpf    Epithelial cells FEW FEW /lpf    Bacteria NEGATIVE  NEG /hpf    UA:UC IF INDICATED CULTURE NOT INDICATED BY UA RESULT CNI     LIPID PANEL   Result Value Ref Range    LIPID PROFILE          Cholesterol, total 155 <200 MG/DL    Triglyceride 76 <150 MG/DL    HDL Cholesterol 54 MG/DL    LDL, calculated 85.8 0 - 100 MG/DL    VLDL, calculated 15.2 MG/DL    CHOL/HDL Ratio 2.9 0 - 5.0     HEMOGLOBIN A1C WITH EAG   Result Value Ref Range    Hemoglobin A1c 5.2 4.2 - 6.3 %    Est. average glucose 103 mg/dL   CBC W/O DIFF   Result Value Ref Range    WBC 4.3 3.6 - 11.0 K/uL    RBC 4.13 3.80 - 5.20 M/uL    HGB 12.7 11.5 - 16.0 g/dL    HCT 36.5 35.0 - 47.0 %    MCV 88.4 80.0 - 99.0 FL    MCH 30.8 26.0 - 34.0 PG    MCHC 34.8 30.0 - 36.5 g/dL    RDW 11.3 (L) 11.5 - 14.5 %    PLATELET 171 150 - 400 K/uL    MPV 12.7 8.9 - 12.9 FL    NRBC 0.0 0  WBC    ABSOLUTE NRBC 0.00 0.00 - 0.01 K/uL        Exam:  Visit Vitals    /62    Pulse 92    Resp 18    Ht 5' 3\" (1.6 m)    Wt 77.1 kg (170 lb)    LMP 02/06/2018    SpO2 97%    BMI 30.11 kg/m2     General:  Alert, cooperative, no distress. Head:  Normocephalic, without obvious abnormality, atraumatic. Respiratory:  Heart:   Non labored breathing  Regular rate and rhythm, no murmurs   Neck:      Extremities: Warm, no cyanosis or edema. Pulses: 2+ radial pulses. Neurologic:  MS: Alert and oriented x 4, speech intact. Language intact. Attention and fund of knowledge appropriate. Recent and remote memory intact. Cranial Nerves:  II: visual fields VFF   II: pupils Equal, round, reactive to light   II: optic disc    III,VII: ptosis none   III,IV,VI: extraocular muscles  EOMI, no nystagmus or diplopia   V: facial light touch sensation     VII: facial muscle function   symmetric   VIII: hearing intact   IX: soft palate elevation  normal   XI: trapezius strength     XI: sternocleidomastoid strength    XII: tongue  Midline     Motor: normal bulk and tone, no tremor              Strength: 5/5 throughout, no PD  Sensory:   Coordination: FTN, HTS, and MIL intact  Gait: normal gait, able to tandem walk  Reflexes: 2+ symmetric, except 1+ left knee       Assessment/Plan   Pt is a 28 y.o. RH female with MS diagnosed after presenting with sudden onset of sensory changes in April, 2017,  tingling in bottom of feet that moved up her legs with walking, hands bilaterally in 4th and 5th digits, moving across to other fingers, tingling from bra line down to just above her pubic bone, around to sides and area felt tight. Sxs improved after course of IV Solumedrol. Family h/o MS in her mother. Started Copaxone 6/30/17.   MRI of C and T spine with contrast on 4/15/17 with abnormal signal with enhancement and possible subtle mass effect in the cord at C4.  MRI brain w/wo contrast 4/17/17 with multiple T2/Flair hyperintensities in the PV and subcortical WM, per radiology <9 lesions, with enhancing lesion in the anterior right temporal lobe. Now with MS exacerbation and multiple new and enhancing lesions on MRI brain w/wo 2/6/18. Exam is unremarkable. Recommend switching DMTs. Stop copaxone. Start Aubagio, side effects reviewed including risk with pregnancy. Pt is preventing pregnancy. CMP, CBC with diff 2/6/18 are unremarkable. Will order HCG and TB testing prior to starting Aubagio. Repeat MRI brain and C-spine planned for April, 2018. Keep planned June appointment, instructed to call when she starts Aubagio so that monthly CBC with diff and CMP x 6 months can be ordered. Instructed to call with any questions. ICD-10-CM ICD-9-CM    1. Multiple sclerosis (HCC) G35 340 QUANTIFERON TB GOLD      HCG QL SERUM       Signed:   Dejuan Hull MD  2/15/2018

## 2018-02-15 NOTE — PATIENT INSTRUCTIONS
[facility]   Statement to Patients  April 1, 2014      In an effort to ensure the large volume of patient prescription refills is processed in the most efficient and expeditious manner, we are asking our patients to assist us by calling your Pharmacy for all prescription refills, this will include also your  Mail Order Pharmacy. The pharmacy will contact our office electronically to continue the refill process. Please do not wait until the last minute to call your pharmacy. We need at least 48 hours (2days) to fill prescriptions. We also encourage you to call your pharmacy before going to  your prescription to make sure it is ready. With regard to controlled substance prescription refill requests (narcotic refills) that need to be picked up at our office, we ask your cooperation by providing us with at least 72 hours (3days) notice that you will need a refill. We will not refill narcotic prescription refill requests after 4:00pm on any weekday, Monday through Thursday, or after 2:00pm on Fridays, or on the weekends. We encourage everyone to explore another way of getting your prescription refill request processed using [employer], our patient web portal through our electronic medical record system. [employer] is an efficient and effective way to communicate your medication request directly to the office and  downloadable as an darrin on your smart phone . [employer] also features a review functionality that allows you to view your medication list as well as leave messages for your physician. Are you ready to get connected? If so please review the attatched instructions or speak to any of our staff to get you set up right away! Thank you so much for your cooperation. Should you have any questions please contact our Practice Administrator.     The Physicians and Staff,  [facility]         Please bring all medication bottles, including vitamins, supplements and any over-the-counter medications, to your next office visit.

## 2018-02-15 NOTE — MR AVS SNAPSHOT
[facility] [address] [facility] [address] 
[phone] Patient: Dominique Buchanan MRN: [id number] :1985 Visit Information Date & Time Provider Department Dept. Phone Encounter #  
 2/15/2018  1:00 PM MD [facility] [address] [facility] [id number] Your Appointments 2018  8:40 AM  
Follow Up with MD [facility]: 6 month f/u MS NN 17  
 [address] [facility] [phone] 12 43 [facility] [address]  
[phone]  
  
   
 [address] [phone] Upcoming Health Maintenance Date Due DTaP/Tdap/Td series (1 - Tdap) 2005 PAP AKA CERVICAL CYTOLOGY 2005 Influenza Age 5 to Adult 2017 Allergies as of 2/15/2018  Review Complete On: 2/15/2018 By: Belgica Gil MD  
 No Known Allergies Current Immunizations  Reviewed on 2017 No immunizations on file. Not reviewed this visit You Were Diagnosed With   
  
 Codes Comments Multiple sclerosis ([facility]    -  Primary ICD-10-CM: G35 
ICD-9-CM: 955 Vitals BP Pulse Resp Height(growth percentile) Weight(growth percentile) LMP  
 106/62 92 18 5' 3\" (1.6 m) 170 lb (77.1 kg) 2018 SpO2 BMI OB Status Smoking Status 97% 30.11 kg/m2 Having regular periods Never Smoker Vitals History BMI and BSA Data Body Mass Index Body Surface Area  
 30.11 kg/m 2 1.85 m 2 Preferred Pharmacy Pharmacy Name Phone [id number] Jossy Corea, [address] [facility] [phone] Your Updated Medication List  
  
   
This list is accurate as of: 2/15/18  1:36 PM.  Always use your most recent med list.  
  
  
  
  
 FISH OIL PO Take  by mouth.  
  
 multivitamin tablet Commonly known as:  ONE A DAY  
 Take 1 Tab by mouth daily. predniSONE 10 mg tablet Commonly known as:  DELTASONE  
6 a day for 3 days then 5 a day for 3 days then 4 a day for 3 days then 2 a day for 3 days then 1 a day for 3 days ten 1 /2 a day for 6 days then 1/2 every other day  For 4 dosages. Indications: MS exacerbation  
  
 sour cherry extract 1,000 mg Cap Take 1 Cap by mouth daily. [facility] (28) 0.25-35 mg-mcg Tab Generic drug:  norgestimate-ethinyl estradiol Take 1 Tab by mouth daily. teriflunomide 14 mg Tab Commonly known as:  AUBAGIO Take 1 Tab by mouth daily. TURMERIC ROOT EXTRACT PO Take 1 Cap by mouth daily. VITAMIN B-12 1,000 mcg tablet Generic drug:  cyanocobalamin Take 1,000 mcg by mouth daily. VITAMIN D3 1,000 unit Cap Generic drug:  cholecalciferol Take 1,000 Units by mouth daily. Prescriptions Printed Refills  
 teriflunomide (AUBAGIO) 14 mg tab 11 Sig: Take 1 Tab by mouth daily. Class: Print Route: Oral  
  
We Performed the Following HCG QL SERUM [18998 CPT(R)] QUANTIFERON TB GOLD [ZCI01752 Custom] Patient Instructions PRESCRIPTION REFILL POLICY [facility] Statement to Patients April 1, 2014 In an effort to ensure the large volume of patient prescription refills is processed in the most efficient and expeditious manner, we are asking our patients to assist us by calling your Pharmacy for all prescription refills, this will include also your  Mail Order Pharmacy. The pharmacy will contact our office electronically to continue the refill process. Please do not wait until the last minute to call your pharmacy. We need at least 48 hours (2days) to fill prescriptions. We also encourage you to call your pharmacy before going to  your prescription to make sure it is ready.   
 
With regard to controlled substance prescription refill requests (narcotic refills) that need to be picked up at our office, we ask your cooperation by providing us with at least 72 hours (3days) notice that you will need a refill. We will not refill narcotic prescription refill requests after 4:00pm on any weekday, Monday through Thursday, or after 2:00pm on Fridays, or on the weekends. We encourage everyone to explore another way of getting your prescription refill request processed using [employer], our patient web portal through our electronic medical record system. [employer] is an efficient and effective way to communicate your medication request directly to the office and  downloadable as an darrin on your smart phone . [employer] also features a review functionality that allows you to view your medication list as well as leave messages for your physician. Are you ready to get connected? If so please review the attatched instructions or speak to any of our staff to get you set up right away! Thank you so much for your cooperation. Should you have any questions please contact our Practice Administrator. The Physicians and Staff,  [facility] Please bring all medication bottles, including vitamins, supplements and any over-the-counter medications, to your next office visit. Introducing [facility]! [employer] Fragoso introduces [employer] patient portal. Now you can access parts of your medical record, email your doctor's office, and request medication refills online. 1. In your internet browser, go to [email or web address] 2. Click on the First Time User? Click Here link in the Sign In box. You will see the New Member Sign Up page. 3. Enter your [employer] Access Code exactly as it appears below. You will not need to use this code after youve completed the sign-up process. If you do not sign up before the expiration date, you must request a new code. · [employer] Access Code: [email or web address] Expires: 3/27/2018  8:31 AM 
 
 4. Enter the last four digits of your Social Security Number (xxxx) and Date of Birth (mm/dd/yyyy) as indicated and click Submit. You will be taken to the next sign-up page. 5. Create a [employer] ID. This will be your [employer] login ID and cannot be changed, so think of one that is secure and easy to remember. 6. Create a [employer] password. You can change your password at any time. 7. Enter your Password Reset Question and Answer. This can be used at a later time if you forget your password. 8. Enter your e-mail address. You will receive e-mail notification when new information is available in [address] 9. Click Sign Up. You can now view and download portions of your medical record. 10. Click the Download Summary menu link to download a portable copy of your medical information. If you have questions, please visit the Frequently Asked Questions section of the [employer] website. Remember, [employer] is NOT to be used for urgent needs. For medical emergencies, dial 911. Now available from your iPhone and Android! Please provide this summary of care documentation to your next provider. Your primary care clinician is listed as NONE. If you have any questions after today's visit, please call [phone].

## 2018-02-16 ENCOUNTER — TELEPHONE (OUTPATIENT)
Dept: NEUROLOGY | Age: 33
End: 2018-02-16

## 2018-02-16 LAB — B-HCG SERPL QL: NEGATIVE MIU/ML

## 2018-02-16 NOTE — TELEPHONE ENCOUNTER
Aubagio drug rep stopped by the office to let us know about a dinner event next week and wanted to invite . Claudene Pitt since she was just started on Aubagio yesterday. Called patient and informed her of the event. She stated her  mentioned it to her as well. She thanked me for the call and informing her of the upcoming event. Patient was given an opportunity to ask questions, repeated information, and verbalized understanding.

## 2018-02-20 ENCOUNTER — TELEPHONE (OUTPATIENT)
Dept: NEUROLOGY | Age: 33
End: 2018-02-20

## 2018-02-20 LAB
ANNOTATION COMMENT IMP: NORMAL
GAMMA INTERFERON BACKGROUND BLD IA-ACNC: 0.03 IU/ML
M TB IFN-G BLD-IMP: NEGATIVE
M TB IFN-G CD4+ BCKGRND COR BLD-ACNC: 0 IU/ML
M TB IFN-G CD4+ T-CELLS BLD-ACNC: 0.03 IU/ML
MITOGEN IGNF BLD-ACNC: 1.17 IU/ML
QUANTIFERON INCUBATION: NORMAL
SERVICE CMNT-IMP: NORMAL

## 2018-02-20 NOTE — TELEPHONE ENCOUNTER
----- Message from Elba Mims MD sent at 2/20/2018  8:16 AM EST -----  Please call pt: Screening labs are normal. Ok to start Aubagio. Has she received the drug yet?

## 2018-02-20 NOTE — TELEPHONE ENCOUNTER
Spoke with patient and informed her that, per Dr. Daphnie Timmons, screening labs are normal. Patient stated she spoke with [facility] customer service on Friday, 2/16/18, and was told they will be shipping out samples until the PA is approved. She stated she is currently going out to [place] but will call our office once she gets the samples meds. She stated it will likely be Friday, 2/23/18 or Monday, 2/26/18. Will make Dr. Daphnie Timmons aware of this information.

## 2018-02-26 ENCOUNTER — TELEPHONE (OUTPATIENT)
Dept: NEUROLOGY | Age: 33
End: 2018-02-26

## 2018-02-26 NOTE — TELEPHONE ENCOUNTER
Spoke with patient and informed her of Dr. Yony Langley recommendations. Patient was given an opportunity to ask questions, repeated information, and verbalized understanding.

## 2018-02-26 NOTE — TELEPHONE ENCOUNTER
Spoke with patient. She stated she started Aubagio yesterday, 2/26/18, and took her dose again this morning. She stated she is experiencing \"pretty nasty\" lower right back pain. It hurts more so when moving rather than to touch. She stated back pain was a side effect listed and just wanted to check in with us and see what Dr. Phuc Parker recommends. Advised will discuss this with Dr. Phuc Parker and will call patient back with her recommendations.

## 2018-02-26 NOTE — TELEPHONE ENCOUNTER
[place] - That is a very odd side effect to have due to Aubagio. Have her hold the med for 1 week and see if pain improves, then rechallenge. If no improvement, she should see PCP. Keep a posted.

## 2018-02-27 ENCOUNTER — TELEPHONE (OUTPATIENT)
Dept: NEUROLOGY | Age: 33
End: 2018-02-27

## 2018-02-27 NOTE — TELEPHONE ENCOUNTER
[place] - rec'd approval from [facility]   2/26/18  - 8/26/18. Notified [employer]. Faxed auth to [facility] S[facility].

## 2018-03-12 ENCOUNTER — TELEPHONE (OUTPATIENT)
Dept: NEUROLOGY | Age: 33
End: 2018-03-12

## 2018-03-12 ENCOUNTER — HOSPITAL ENCOUNTER (OUTPATIENT)
Dept: INFUSION THERAPY | Age: 33
Discharge: HOME | End: 2018-03-12
Payer: COMMERCIAL

## 2018-03-12 VITALS
HEART RATE: 67 BPM | DIASTOLIC BLOOD PRESSURE: 79 MMHG | RESPIRATION RATE: 16 BRPM | TEMPERATURE: 97.4 F | SYSTOLIC BLOOD PRESSURE: 119 MMHG

## 2018-03-12 DIAGNOSIS — G35 MULTIPLE SCLEROSIS EXACERBATION (HCC): Primary | ICD-10-CM

## 2018-03-12 DIAGNOSIS — G95.9 SPINAL CORD LESION (HCC): ICD-10-CM

## 2018-03-12 PROCEDURE — 74011000258 HC RX REV CODE- 258: Performed by: PSYCHIATRY & NEUROLOGY

## 2018-03-12 PROCEDURE — 74011250636 HC RX REV CODE- 250/636: Performed by: PSYCHIATRY & NEUROLOGY

## 2018-03-12 PROCEDURE — 96365 THER/PROPH/DIAG IV INF INIT: CPT

## 2018-03-12 RX ORDER — PREDNISONE 10 MG/1
TABLET ORAL
Qty: 27 TAB | Refills: 0 | Status: SHIPPED | OUTPATIENT
Start: 2018-03-12 | End: 2018-04-05 | Stop reason: ALTCHOICE

## 2018-03-12 RX ADMIN — SODIUM CHLORIDE 1000 MG: 900 INJECTION, SOLUTION INTRAVENOUS at 14:36

## 2018-03-12 NOTE — TELEPHONE ENCOUNTER
Infusion order form faxed to [facility] and marked STAT.  Rx faxed to [facility].

## 2018-03-12 NOTE — PROGRESS NOTES
1400 Pt arrived ambulatory and in no distress for Solu-Medrol. Assessment complete. Pt reports she has developed a slightly slurred speech, facial droopiness, and \"messy handwriting;\" all symptoms reported to neurologist. Pt reports she is scheduled for an MRI tomorrow. Patient Vitals for the past 12 hrs:   Temp Pulse Resp BP   03/12/18 1400 97.4 °F (36.3 °C) 67 16 119/79     Medications:  Solu-Medrol 1 gram IV    1525 Discharged home ambulatory and in no distress. Next appointment 03/13/18.

## 2018-03-12 NOTE — TELEPHONE ENCOUNTER
Spoke with patient and informed her of Dr. Neptali Hernandez recommendations. Patient was given an opportunity to ask questions, repeated information, and verbalized understanding.

## 2018-03-12 NOTE — PROGRESS NOTES
Problem: Knowledge Deficit  Goal: *Verbalizes understanding of procedures and medications  Outcome: Progressing Towards Goal  Solu-Medrol. Reviewed purpose, frequency, possible side effects. Pt denies any questions or concerns.

## 2018-03-12 NOTE — TELEPHONE ENCOUNTER
Kadi - Please call pt: This is most likely an MS exacerbation. I will order an MRI brain w/wo contrast and start IV solumedrol 1g daily x 5 days, then oral taper -arrange with home health or infusion center to start today, or tomorrow AM at latest.  I will go ahead and order MRI C-spine since this was planned for April.

## 2018-03-12 NOTE — TELEPHONE ENCOUNTER
Spoke with patient. She stated she is experiencing slurred speech and drooping on right side of her face. She stated symptoms started on 3/8/18 and has not worsened since onset. She reported her handwriting has worsened. Writer advised for patient to go to the ER. Patient stated she didn't really wanted to go to the ER and asked if there was anything else that could be done. Writer stated can discuss with Dr. Meagan Soni when she comes out of a patient room and call back with her recommendations but should symptoms worsen before returning her call, patient needs to go to the ER immediately. Patient was given an opportunity to ask questions, repeated information, and verbalized understanding.

## 2018-03-13 ENCOUNTER — TELEPHONE (OUTPATIENT)
Dept: NEUROLOGY | Age: 33
End: 2018-03-13

## 2018-03-13 ENCOUNTER — HOSPITAL ENCOUNTER (OUTPATIENT)
Dept: MRI IMAGING | Age: 33
Discharge: HOME | End: 2018-03-13
Payer: COMMERCIAL

## 2018-03-13 ENCOUNTER — HOSPITAL ENCOUNTER (OUTPATIENT)
Dept: INFUSION THERAPY | Age: 33
Discharge: HOME | End: 2018-03-13
Payer: COMMERCIAL

## 2018-03-13 VITALS
RESPIRATION RATE: 16 BRPM | SYSTOLIC BLOOD PRESSURE: 107 MMHG | HEART RATE: 91 BPM | TEMPERATURE: 97.8 F | DIASTOLIC BLOOD PRESSURE: 74 MMHG

## 2018-03-13 DIAGNOSIS — G95.9 SPINAL CORD LESION (HCC): ICD-10-CM

## 2018-03-13 DIAGNOSIS — G35 MULTIPLE SCLEROSIS EXACERBATION (HCC): ICD-10-CM

## 2018-03-13 DIAGNOSIS — G37.9 DEMYELINATING DISEASE (HCC): Primary | ICD-10-CM

## 2018-03-13 PROCEDURE — 70553 MRI BRAIN STEM W/O & W/DYE: CPT

## 2018-03-13 PROCEDURE — 96365 THER/PROPH/DIAG IV INF INIT: CPT

## 2018-03-13 PROCEDURE — A9576 INJ PROHANCE MULTIPACK: HCPCS | Performed by: RADIOLOGY

## 2018-03-13 PROCEDURE — 74011250636 HC RX REV CODE- 250/636: Performed by: PSYCHIATRY & NEUROLOGY

## 2018-03-13 PROCEDURE — 74011000258 HC RX REV CODE- 258: Performed by: PSYCHIATRY & NEUROLOGY

## 2018-03-13 RX ORDER — SODIUM CHLORIDE 0.9 % (FLUSH) 0.9 %
10 SYRINGE (ML) INJECTION
Status: COMPLETED | OUTPATIENT
Start: 2018-03-13 | End: 2018-03-13

## 2018-03-13 RX ADMIN — SODIUM CHLORIDE 1000 MG: 900 INJECTION, SOLUTION INTRAVENOUS at 13:12

## 2018-03-13 RX ADMIN — Medication 10 ML: at 12:00

## 2018-03-13 NOTE — PROGRESS NOTES
1305 Pt arrived ambulatory and in no distress for Solu-Medrol. Assessment complete. No new complaints voiced. 24 gauge IV in right forearm intact; some bruising noted. Patient Vitals for the past 12 hrs:   Temp Pulse Resp BP   03/13/18 1309 97.8 °F (36.6 °C) 91 16 107/74       Medications:  Solu-Medrol 1 gram IV  IV left intact for infusion tomorrow, wrapped with Coban  1400 Discharged home ambulatory and in no distress. Next appointment 03/14/18.

## 2018-03-13 NOTE — TELEPHONE ENCOUNTER
Pt has taken 8 days of Aubagio. Discussed starting Tysabri. Will investigate this further in the AM and talk with her tomorrow.

## 2018-03-13 NOTE — PROGRESS NOTES
Problem: Knowledge Deficit  Goal: *Verbalizes understanding of procedures and medications  Outcome: Progressing Towards Goal  Solu-Medrol. Pt denies any questions or concerns.

## 2018-03-13 NOTE — TELEPHONE ENCOUNTER
Spoke with patient, as requested by Dr. Aimee Michael. Informed her that Dr. Aimee Michael will call her later today with her MRI results but is going to change her medication from Aubagio to a different MS medication. Advised we wanted to call her before [place] calls her to accept shipment of the medication. Advised Dr. Aimee Michael wants her to continue her Solu-Medrol infusions.  Patient was given an opportunity to ask questions, repeated information, and verbalized understanding.

## 2018-03-13 NOTE — TELEPHONE ENCOUNTER
Received call from radiology - significant progression of lesions with multiple enhancing lesions just since her last MRI brain one month ago. MRI C-spine is pending. [place] - Please call pt:  Hold off on Aubagio. I am going to switch her to an infusion therapy. I will give her a call today to discuss.   Continue IV solumedrol followed by oral taper.  (send back to me.)

## 2018-03-13 NOTE — TELEPHONE ENCOUNTER
Spoke with patient, as requested by Dr. Lis Love. Informed her that Dr. Lis Love will call her later today with her MRI results but is going to change her medication from Aubagio to a different MS medication. Advised we wanted to call her before [place] calls her to accept shipment of the medication. Advised Dr. Lis Love wants her to continue her Solu-Medrol infusions. Patient was given an opportunity to ask questions, repeated information, and verbalized understanding. See phone note dated 3/13/18.

## 2018-03-14 ENCOUNTER — HOSPITAL ENCOUNTER (OUTPATIENT)
Dept: INFUSION THERAPY | Age: 33
Discharge: HOME | End: 2018-03-14
Payer: COMMERCIAL

## 2018-03-14 VITALS — RESPIRATION RATE: 16 BRPM | SYSTOLIC BLOOD PRESSURE: 117 MMHG | TEMPERATURE: 97.6 F | DIASTOLIC BLOOD PRESSURE: 74 MMHG

## 2018-03-14 PROCEDURE — 74011000258 HC RX REV CODE- 258: Performed by: PSYCHIATRY & NEUROLOGY

## 2018-03-14 PROCEDURE — 96365 THER/PROPH/DIAG IV INF INIT: CPT

## 2018-03-14 PROCEDURE — 74011250636 HC RX REV CODE- 250/636: Performed by: PSYCHIATRY & NEUROLOGY

## 2018-03-14 RX ADMIN — SODIUM CHLORIDE 1000 MG: 900 INJECTION, SOLUTION INTRAVENOUS at 13:17

## 2018-03-14 NOTE — TELEPHONE ENCOUNTER
Kadi - I have ordered EDGAR Virus testing. Please complete Tysabri paperwork, but hold off on sending it until after testing completed.

## 2018-03-14 NOTE — PROGRESS NOTES
1305 Pt arrived ambulatory and in no distress for Solu-Medrol. Assessment complete. No new complaints voiced. 24 gauge IV in right forearm intact; some bruising noted. Patient Vitals for the past 12 hrs:   Temp Resp BP   03/14/18 1312 97.6 °F (36.4 °C) 16 117/74     Medications:  Solu-Medrol 1 gram IV    1350 Discharged home ambulatory and in no distress. Next appointment 03/15/18.

## 2018-03-14 NOTE — TELEPHONE ENCOUNTER
Spoke with patient and informed her of Dr. Patti Siddiqi recommendations. Patient would like to  lab orders tomorrow morning. Patient was given an opportunity to ask questions, repeated information, and verbalized understanding.

## 2018-03-15 ENCOUNTER — HOSPITAL ENCOUNTER (OUTPATIENT)
Dept: INFUSION THERAPY | Age: 33
Discharge: HOME | End: 2018-03-15
Payer: COMMERCIAL

## 2018-03-15 VITALS
TEMPERATURE: 97.5 F | HEART RATE: 54 BPM | RESPIRATION RATE: 16 BRPM | OXYGEN SATURATION: 100 % | DIASTOLIC BLOOD PRESSURE: 62 MMHG | SYSTOLIC BLOOD PRESSURE: 117 MMHG

## 2018-03-15 PROCEDURE — 96365 THER/PROPH/DIAG IV INF INIT: CPT

## 2018-03-15 PROCEDURE — 74011250636 HC RX REV CODE- 250/636: Performed by: PSYCHIATRY & NEUROLOGY

## 2018-03-15 PROCEDURE — 74011000258 HC RX REV CODE- 258: Performed by: PSYCHIATRY & NEUROLOGY

## 2018-03-15 RX ADMIN — SODIUM CHLORIDE 1000 MG: 900 INJECTION, SOLUTION INTRAVENOUS at 13:02

## 2018-03-15 NOTE — PROGRESS NOTES
1240 Pt arrived ambulatory and in no distress for Solu-Medrol. Assessment complete. No new complaints. 24 gauge IV started without difficulty in right AC. Patient Vitals for the past 12 hrs:   Temp Pulse Resp BP SpO2   03/15/18 1339 - (!) 54 16 117/62 -   03/15/18 1245 97.5 °F (36.4 °C) 60 18 123/76 100 %     Medications:  Solu-Medrol 1 gram IV    1340 Discharged home ambulatory and in no distress. Next appointment 03/16/18.

## 2018-03-16 ENCOUNTER — HOSPITAL ENCOUNTER (OUTPATIENT)
Dept: INFUSION THERAPY | Age: 33
Discharge: HOME | End: 2018-03-16
Payer: COMMERCIAL

## 2018-03-16 VITALS
HEART RATE: 55 BPM | DIASTOLIC BLOOD PRESSURE: 79 MMHG | RESPIRATION RATE: 16 BRPM | SYSTOLIC BLOOD PRESSURE: 130 MMHG | TEMPERATURE: 96.6 F

## 2018-03-16 PROCEDURE — 74011000258 HC RX REV CODE- 258: Performed by: PSYCHIATRY & NEUROLOGY

## 2018-03-16 PROCEDURE — 96365 THER/PROPH/DIAG IV INF INIT: CPT

## 2018-03-16 PROCEDURE — 74011250636 HC RX REV CODE- 250/636: Performed by: PSYCHIATRY & NEUROLOGY

## 2018-03-16 RX ADMIN — SODIUM CHLORIDE 1000 MG: 900 INJECTION, SOLUTION INTRAVENOUS at 13:11

## 2018-03-16 NOTE — PROGRESS NOTES
1305 Pt arrived ambulatory and in no distress for Solu-Medrol. Assessment complete. No new complaints. 24 gauge IV intact to right AC. Patient Vitals for the past 12 hrs:   Temp Pulse Resp BP   03/16/18 1344 - (!) 55 - 130/79   03/16/18 1307 96.6 °F (35.9 °C) 64 16 146/81     Medications:  Solu-Medrol 1 gram IV    1350 Discharged home ambulatory and in no distress. Pt to follow-up with referring.

## 2018-03-21 LAB — JCPYV DNA SPEC QL NAA+PROBE: NEGATIVE

## 2018-03-21 NOTE — TELEPHONE ENCOUNTER
Spoke with patient. Informed her that, per Dr. Artem Au, [address] testing was negative and that Dr. Artem Au wants to proceed with Tysabri infusions. Informed her that Dr. Artem Au plans to complete her portion by tomorrow morning. Informed patient that there are sections on the form that need her signature. Patient agreed to come up to the office tomorrow afternoon to sign forms. Patient instructed to keep both April and June appointments as of now. Patient was given an opportunity to ask questions, repeated information, and verbalized understanding.

## 2018-03-22 ENCOUNTER — DOCUMENTATION ONLY (OUTPATIENT)
Dept: NEUROLOGY | Age: 33
End: 2018-03-22

## 2018-03-22 NOTE — TELEPHONE ENCOUNTER
Patient stopped by the office and signed Tysabri form. She did not have any questions at that time but was encouraged to call back with any questions that may arise. Completed Tysabri enrollment form faxed to [employer].

## 2018-03-22 NOTE — PROGRESS NOTES
Received fax from [facility]. MRI cerv spine w wo cont authorized from 3/21/18 to 6/19/18. Reference number: [id number]. Authorization number: [id number].

## 2018-03-22 NOTE — TELEPHONE ENCOUNTER
Form completed. Please call pt to sign. Please call me if she has any additional questions after reviewing form.

## 2018-03-26 ENCOUNTER — HOSPITAL ENCOUNTER (OUTPATIENT)
Dept: MRI IMAGING | Age: 33
Discharge: HOME | End: 2018-03-26
Payer: COMMERCIAL

## 2018-03-26 DIAGNOSIS — G35 MULTIPLE SCLEROSIS EXACERBATION (HCC): ICD-10-CM

## 2018-03-26 DIAGNOSIS — G95.9 SPINAL CORD LESION (HCC): ICD-10-CM

## 2018-03-26 PROCEDURE — A9576 INJ PROHANCE MULTIPACK: HCPCS | Performed by: RADIOLOGY

## 2018-03-26 PROCEDURE — 72156 MRI NECK SPINE W/O & W/DYE: CPT

## 2018-03-26 RX ORDER — SODIUM CHLORIDE 0.9 % (FLUSH) 0.9 %
10 SYRINGE (ML) INJECTION
Status: COMPLETED | OUTPATIENT
Start: 2018-03-26 | End: 2018-03-26

## 2018-03-26 RX ADMIN — Medication 10 ML: at 08:30

## 2018-04-05 ENCOUNTER — OFFICE VISIT (OUTPATIENT)
Dept: NEUROLOGY | Age: 33
End: 2018-04-05

## 2018-04-05 VITALS
DIASTOLIC BLOOD PRESSURE: 86 MMHG | WEIGHT: 165.6 LBS | OXYGEN SATURATION: 98 % | RESPIRATION RATE: 20 BRPM | HEART RATE: 98 BPM | BODY MASS INDEX: 29.34 KG/M2 | HEIGHT: 63 IN | SYSTOLIC BLOOD PRESSURE: 126 MMHG

## 2018-04-05 DIAGNOSIS — G35 MULTIPLE SCLEROSIS EXACERBATION (HCC): Primary | ICD-10-CM

## 2018-04-05 RX ORDER — GLATIRAMER ACETATE 40 MG/ML
INJECTION, SOLUTION SUBCUTANEOUS
COMMUNITY
Start: 2018-02-09 | End: 2018-07-24

## 2018-04-05 NOTE — PROGRESS NOTES
Chief Complaint   Patient presents with    Multiple Sclerosis     follow up, \"5 day steroid treatments, 3/11-3/15, for facial dropping\", \"still waiting for approval for Tysabri injection\"     1. Have you been to the ER, urgent care clinic since your last visit? Hospitalized since your last visit? No    2. Have you seen or consulted any other health care providers outside of the [address] [place] [address] since your last visit? Include any pap smears or colon screening. Seen by PCP for a new patient physical.      Patient complains of recent onset of anxiety. Believes she may have had a panic attack a few nights ago. Also, complains of on going spotting since her last menstrual cycle in March following her 5 day steroid treatment.

## 2018-04-05 NOTE — MR AVS SNAPSHOT
[facility] [address] [place] [address] 
[phone] Patient: Manpreet Monge MRN: [id number] :1985 Visit Information Date & Time Provider Department Dept. Phone Encounter #  
 2018  2:20 PM MD Isabela Rashid [facility] at [address] [place] [address] [id number] Follow-up Instructions Return in about 3 months (around 2018). Your Appointments 2018  8:40 AM  
Follow Up with MD Isabela Rashid [facility] at [facility] Appt Note: 6 month f/u MS NN 17  
 [address] [address] [phone] 43 [facility] [address]  
[phone]  
  
   
 [address] [place] [id number] Upcoming Health Maintenance Date Due DTaP/Tdap/Td series (1 - Tdap) 2005 PAP AKA CERVICAL CYTOLOGY 2005 Influenza Age 5 to Adult 2017 Allergies as of 2018  Review Complete On: 2018 By: Billy Haskins MD  
 No Known Allergies Current Immunizations  Reviewed on 3/16/2018 No immunizations on file. Not reviewed this visit Vitals BP Pulse Resp Height(growth percentile) Weight(growth percentile) SpO2  
 126/86 (BP 1 Location: Left arm, BP Patient Position: Sitting) 98 20 5' 3\" (1.6 m) 165 lb 9.6 oz (75.1 kg) 98% BMI OB Status Smoking Status 29.33 kg/m2 Having regular periods Never Smoker Vitals History BMI and BSA Data Body Mass Index Body Surface Area  
 29.33 kg/m 2 1.83 m 2 Preferred Pharmacy Pharmacy Name Phone [facility] [phone] Your Updated Medication List  
  
   
This list is accurate as of 18  3:03 PM.  Always use your most recent med list.  
  
  
  
  
 COPAXONE 40 mg/mL injection Generic drug:  glatiramer FISH OIL PO Take  by mouth.  
  
 multivitamin tablet Commonly known as:  ONE A DAY Take 1 Tab by mouth daily. natalizumab 300 mg/15 mL injection Commonly known as:  TYSABRI  
300 mg by IntraVENous route every twenty-eight (28) days. sour cherry extract 1,000 mg Cap Take 1 Cap by mouth daily. [address] [facility] (28) 0.25-35 mg-mcg Tab Generic drug:  norgestimate-ethinyl estradiol Take 1 Tab by mouth daily. TURMERIC ROOT EXTRACT PO Take 1 Cap by mouth daily. VITAMIN B-12 1,000 mcg tablet Generic drug:  cyanocobalamin Take 1,000 mcg by mouth daily. VITAMIN D3 1,000 unit Cap Generic drug:  cholecalciferol Take 1,000 Units by mouth daily. Follow-up Instructions Return in about 3 months (around 7/5/2018). Introducing [facility]! [employer] introduces [employer] patient portal. Now you can access parts of your medical record, email your doctor's office, and request medication refills online. 1. In your internet browser, go to [email or web address]. [email or web address] 2. Click on the First Time User? Click Here link in the Sign In box. You will see the New Member Sign Up page. 3. Enter your [employer] Access Code exactly as it appears below. You will not need to use this code after youve completed the sign-up process. If you do not sign up before the expiration date, you must request a new code. · [employer] Access Code: [email or web address] Expires: 7/4/2018  2:15 PM 
 
4. Enter the last four digits of your Social Security Number (xxxx) and Date of Birth (mm/dd/yyyy) as indicated and click Submit. You will be taken to the next sign-up page. 5. Create a [email or web address] ID. This will be your [employer] login ID and cannot be changed, so think of one that is secure and easy to remember. 6. Create a [email or web address] password. You can change your password at any time. 7. Enter your Password Reset Question and Answer. This can be used at a later time if you forget your password. 8. Enter your e-mail address. You will receive e-mail notification when new information is available in [address] 9. Click Sign Up. You can now view and download portions of your medical record. 10. Click the Download Summary menu link to download a portable copy of your medical information. If you have questions, please visit the Frequently Asked Questions section of the [employer] website. Remember, [employer] is NOT to be used for urgent needs. For medical emergencies, dial 911. Now available from your iPhone and Android! Please provide this summary of care documentation to your next provider. Your primary care clinician is listed as JeovanyDevora Gomez. If you have any questions after today's visit, please call [phone].

## 2018-04-05 NOTE — PROGRESS NOTES
Neurology Progress Note      HISTORY PROVIDED BY: patient    Chief Complaint:   Chief Complaint   Patient presents with    Multiple Sclerosis     follow up, \"5 day steroid treatments, 3/11-3/15, for facial dropping\", \"still waiting for approval for Tysabri injection\"      Subjective:   Pt is a 28 y.o. RH female last seen in clinic 2/15/18 in f/u for MS diagnosed after presenting with sudden onset of sensory changes in April, 2017,  tingling in bottom of feet that moved up her legs with walking, hands bilaterally in 4th and 5th digits, moving across to other fingers, tingling from bra line down to just above her pubic bone, around to sides and area felt tight. Sxs improved after course of IV Solumedrol. Family h/o MS in her mother. Started Copaxone 6/30/17. MRI of C and T spine with contrast on 4/15/17 with abnormal signal with enhancement and possible subtle mass effect in the cord at C4. MRI brain w/wo contrast 4/17/17 with multiple T2/Flair hyperintensities in the PV and subcortical WM, per radiology <9 lesions, with enhancing lesion in the anterior right temporal lobe. With MS exacerbation and multiple new and enhancing lesions on MRI brain w/wo 2/6/18. S/p IV steroids. Exam was unremarkable. Recommended switching DMTs. Stopped copaxone. Started Aubagio, side effects reviewed including risk with pregnancy. CMP, CBC with diff 2/6/18- unremarkable. Ordered HCG and TB testing. Repeat MRI brain and C-spine planned for April, 2018. Keep planned June appointment, instructed to call when she starts Aubagio so that monthly CBC with diff and CMP x 6 months can be ordered. She returns for f/u. She called the clinic on 3/12/18 c/o slurred speech, right facial droop, and worsened handwriting, started on 3/8/18 and has not worsened since onset. Ordered MRI brain and C-spine w/wo contrast and started IV solumedrol 1g daily x 5 days, then oral taper.   MRIs 3/13/18 revealed significant progression of lesions with multiple enhancing lesions just since her last MRI brain one month prior and T2 hyperintense focus in the cord at the C4 level had decrease in intensity of T2 signal, as well as resolution of mild expansion of the cord. Told to stop Aubagio and recommended starting Tysabri given severity of disease burden and rapid worsening despite IV steroids. She completed IV steroids and then oral taper one week ago. JCV by PCR was negative 3/15/18. Woke up last night with heart racing and -200, lasted about an hour. No CP. No prior h/o similar sxs or panic attacks. She is also having spotting after period which she has never had. She has been feeling a little stressed or anxious given health issues. She was also named  of a department at work. Her speech is better, droop is improved. May still have a little word finding. Still having loss of temp sensation in left leg. Occ has shooting electricity type pain down legs when flexes neck starting two weeks ago. Past Medical History:   Diagnosis Date    Multiple sclerosis ([facility]     LP on 4/18/17 with normal OP 21mmHg, CSF studies were normal except +OGB and elevated IgG Index.       Past Surgical History:   Procedure Laterality Date    HX ROTATOR CUFF REPAIR Right 1999      Social History     Social History    Marital status:      Spouse name: N/A    Number of children: N/A    Years of education: N/A     Occupational History     at [address] [place] [address] History Main Topics    Smoking status: Never Smoker    Smokeless tobacco: Never Used      Comment: smokes socially    Alcohol use 1.2 - 1.8 oz/week     2 - 3 Standard drinks or equivalent per week    Drug use: No    Sexual activity: Not on file     Other Topics Concern    Not on file     Social History Narrative    Lives in [place] with      Family History   Problem Relation Age of Onset    MS Mother      Dec 48yo    Other Father      Estranged    No Known Problems Sister     No Known Problems Brother     Other Brother      Hep C         Objective:   Review of Systems : Per HPI, o/w neg    No Known Allergies     Meds:  Outpatient Medications Prior to Visit   Medication Sig Dispense Refill    cyanocobalamin (VITAMIN B-12) 1,000 mcg tablet Take 1,000 mcg by mouth daily.  DOCOSAHEXANOIC ACID/EPA (FISH OIL PO) Take  by mouth.  multivitamin (ONE A DAY) tablet Take 1 Tab by mouth daily.  sour cherry extract 1,000 mg cap Take 1 Cap by mouth daily.  cholecalciferol (VITAMIN D3) 1,000 unit cap Take 1,000 Units by mouth daily.  TURMERIC ROOT EXTRACT PO Take 1 Cap by mouth daily.  norgestimate-ethinyl estradiol (SPRINTEC, 28,) 0.25-35 mg-mcg tab Take 1 Tab by mouth daily.  natalizumab (TYSABRI) 300 mg/15 mL injection 300 mg by IntraVENous route every twenty-eight (28) days. 1 Vial 11    predniSONE (DELTASONE) 10 mg tablet 6tabs a day x 2 days, then 4 a day x 2 days, then 2 a day x 2 days, then 1 a day x 2 days, then 1/2 a day x 2 days, then stop  Indications: MS exacerbation 27 Tab 0     No facility-administered medications prior to visit. Imaging:  MRI Results (most recent):    Results from Hospital Encounter encounter on 03/26/18   MRI CERV SPINE W WO CONT   Narrative EXAM:  MRI CERV SPINE W WO CONT    INDICATION:  Pt with MS eval for new or enhancing lesions. .    COMPARISON: 4/15/2017    TECHNIQUE: MR imaging of the cervical spine was performed using the following  sequences: sagittal T1, T2, STIR;  axial T2, T1 prior to and following contrast  administration. CONTRAST:  14 mL of ProHance gadolinium. FINDINGS:    There is normal alignment of the cervical spine. Vertebral body heights are  maintained. Marrow signal is normal.    The craniocervical junction is intact. Abnormal T2 hyperintense signal in the  cord previously described at the C3-4 level has become less T2 hyperintense.  In  addition, there is no longer any associated enhancement or expansion of the cord  at this level. There are no new lesions. There is no pathologic intrathecal  enhancement. The paraspinal soft tissues are within normal limits. C2-C3:  No herniation or stenosis. C3-C4:  Minimal right-sided uncovertebral joint hypertrophy. No neuroforaminal  narrowing or spinal canal stenosis. C4-C5:  Minimal right-sided uncovertebral joint hypertrophy. No neuroforaminal  narrowing or spinal canal stenosis. C5-C6:  No herniation or stenosis. C6-C7:  No herniation or stenosis. C7-T1:  No herniation or stenosis. Impression IMPRESSION:  T2 hyperintense focus in the cord at the C4 level demonstrates decrease in  intensity of T2 signal, as well as resolution of mild expansion of the cord at  this level. No associated enhancement. No new lesions. CT Results (most recent):    Results from Hospital Encounter encounter on 04/09/17   CT HEAD WO CONT   Narrative EXAM:  CT HEAD WO CONT    INDICATION:   Head trauma, closed, mild, GCS >= 13, no risk factors, neuro exam  normal    COMPARISON: None. TECHNIQUE: Unenhanced CT of the head was performed using 5 mm images. Brain and  bone windows were generated. CT dose reduction was achieved through use of a  standardized protocol tailored for this examination and automatic exposure  control for dose modulation. FINDINGS:  There is no extra-axial fluid collection hemorrhage shift or masses her. Ventricles are normal in size and midline. Impression impression: No acute changes.              Reviewed records in [facility] tab today    Lab Review   Results for orders placed or performed in visit on 03/13/18   EDGAR VIRUS DNA BY PCR, QL   Result Value Ref Range    EDGAR VIRUS DNA,PCR (WHOLE BLOOD) Negative Negative        Exam:  Visit Vitals    /86 (BP 1 Location: Left arm, BP Patient Position: Sitting)    Pulse 98    Resp 20    Ht 5' 3\" (1.6 m)    Wt 75.1 kg (165 lb 9.6 oz)    SpO2 98%    BMI 29.33 kg/m2     General:  Alert, cooperative, no distress. Head:  Normocephalic, without obvious abnormality, atraumatic. Respiratory:  Heart:   Non labored breathing  Regular rate and rhythm, no murmurs   Neck:      Extremities: Warm, no cyanosis or edema. Pulses: 2+ radial pulses. Neurologic:  MS: Alert and oriented x 4, speech intact. Language intact. Attention and fund of knowledge appropriate. Recent and remote memory intact. Cranial Nerves:  II: visual fields VFF   II: pupils Equal, round, reactive to light   II: optic disc    III,VII: ptosis none   III,IV,VI: extraocular muscles  EOMI, no nystagmus or diplopia   V: facial light touch sensation     VII: facial muscle function   symmetric   VIII: hearing intact   IX: soft palate elevation  normal   XI: trapezius strength     XI: sternocleidomastoid strength    XII: tongue  Midline     Motor: normal bulk and tone, no tremor              Strength: 5/5 throughout, no PD  Sensory:   Coordination: FTN, HTS, and MIL intact  Gait: normal gait, able to tandem walk  Reflexes: 2+ symmetric, except 1+ left knee       Assessment/Plan   Pt is a 28 y.o. RH female with RRMS diagnosed after presenting with sudden onset of sensory changes in April, 2017,  tingling in bottom of feet that moved up her legs with walking, hands bilaterally in 4th and 5th digits, moving across to other fingers, tingling from bra line down to just above her pubic bone, around to sides and area felt tight. MRI of C and T spine with contrast on 4/15/17 with abnormal signal with enhancement and possible subtle mass effect in the cord at C4. MRI brain w/wo contrast 4/17/17 with multiple T2/Flair hyperintensities in the PV and subcortical WM, per radiology <9 lesions, with enhancing lesion in the anterior right temporal lobe. Sxs improved after course of IV Solumedrol.  Started Copaxone 6/30/17, but had exacerbation 2/6/18 with multiple new enhancing lesions on MRI brain. S/p IV steroids. Had another exacerbation 3/12/18 with MRI brain 3/13/18 with significant progression of lesions with multiple enhancing lesions just since her last MRI brain one month prior and MRI C-spine with improvement in hyperintensity and cord expansion at C4. Exam was unremarkable. Given aggressive nature of her disease, recommend starting Tysabri, side effects reviewed again with pt. I am not certain how to explain spell last night of heart racing and -200. Recommend she seek medical attention immediately if it occurs again, it may just be a panic attack, but with no prior h/o panic attacks, other etiology should be excluded. Pt instructed to f/u with Ob or PCP regarding abnormal periods. F/u in clinic in 3 months, instructed to call in the interim if needed. ICD-10-CM ICD-9-CM    1. [facility]ation ([facility] G35 340        Signed:   Chrystine Gaucher, MD  4/5/2018

## 2018-04-06 ENCOUNTER — TELEPHONE (OUTPATIENT)
Dept: NEUROLOGY | Age: 33
End: 2018-04-06

## 2018-04-06 RX ORDER — SYRINGE, DISPOSABLE, 3 ML
SYRINGE, EMPTY DISPOSABLE MISCELLANEOUS
Qty: 6 SYRINGE | Refills: 0 | Status: SHIPPED | OUTPATIENT
Start: 2018-04-06 | End: 2018-07-24 | Stop reason: ALTCHOICE

## 2018-04-06 NOTE — TELEPHONE ENCOUNTER
Returned pt call: Left hand and foot tingling/numbness, vision blurry - feels like things are jumping around, L eye worse than right. Slurred speech. No dizziness. They have scheduled Tysabri infusion for next Thursday in anticipation of getting PA needed for infusion center in the meantime. Pt has failed IV solumedrol therapy. Recommend Acthar 80mg SQ daily x 5 days, may give an additional 5 days depending on sxs. Burton Regan - Please call pt to let her know how the Acthar is going to work. She will need to come by today to  sample and sign form.

## 2018-04-06 NOTE — TELEPHONE ENCOUNTER
Spoke with patient about coming in to  sample and go over how medication works.  Patient stated she would stop in today about 1pm. I verbalized understanding

## 2018-04-06 NOTE — TELEPHONE ENCOUNTER
Pt calling, stated she is having some loss of feeling in her left hand, 4th and 5th digits, slurred speech returning, difficulty concentrating and vision issues. Pt is thinking her inflation is back.

## 2018-04-06 NOTE — TELEPHONE ENCOUNTER
----- Message from Tanya Can sent at 4/6/2018 12:53 PM EDT -----  Regarding: Dr. Kwame Falk  Pt is requesting a call because she has samples of the Rx Acthar however she does not know the dosage of how much to inject herself with. Pt needs a call back as soon as possible. Pt best contact number is [phone].

## 2018-04-09 ENCOUNTER — TELEPHONE (OUTPATIENT)
Dept: NEUROLOGY | Age: 33
End: 2018-04-09

## 2018-04-09 NOTE — TELEPHONE ENCOUNTER
Spoke with patient. Informed her that we received a preauth Acthar form that Dr. Aida King has to complete. She stated she has one dose left of the sample for tomorrow. We do not have any more samples in the office. Will discuss with Dr. Aida King about next steps. Patient was given an opportunity to ask questions, repeated information, and verbalized understanding.

## 2018-04-09 NOTE — TELEPHONE ENCOUNTER
Monica Leija and I completed a form on Friday. Gave additional form to Monica Leija to look into this.

## 2018-04-09 NOTE — TELEPHONE ENCOUNTER
----- Message from Kate Flanagan sent at 4/9/2018 12:33 PM EDT -----  Regarding: Dr. Loretta Cade  Pt would like a call back from the nurse regarding the status of next step for pre authorization for her medication(\"Achtar\")  Best contact number [phone].

## 2018-04-10 ENCOUNTER — TELEPHONE (OUTPATIENT)
Dept: NEUROLOGY | Age: 33
End: 2018-04-10

## 2018-04-10 NOTE — TELEPHONE ENCOUNTER
Pt calling, stated she received a call last night from the infusion center, told her to call Dr. Oconnor Juan office, seems as thought the PA for the medication that had been prescribed has been denied, would like to know what the next steps with the Tysabri.

## 2018-04-10 NOTE — TELEPHONE ENCOUNTER
Spoke with patient.  Informed her will check with our PA coordinator and find out the reasoning for denial.

## 2018-04-12 ENCOUNTER — HOSPITAL ENCOUNTER (OUTPATIENT)
Dept: INFUSION THERAPY | Age: 33
End: 2018-04-12

## 2018-04-12 NOTE — TELEPHONE ENCOUNTER
Spoke with [employer].  She stated she does not do the PA for infusion drugs but advised me to check with Jena Allen, patient physician practice liaison, for information on denial.

## 2018-04-12 NOTE — TELEPHONE ENCOUNTER
Spoke with patient. Informed her that I do not have an update at this time and have left a message for Zoya to call back to assist with this. Advised will call her back with updates as soon as they come. Patient was given an opportunity to ask questions, repeated information, and verbalized understanding.

## 2018-04-12 NOTE — TELEPHONE ENCOUNTER
Spoke with Joan Monge at [facility]. She stated the nurses are out of the office but can return my call this afternoon.

## 2018-04-12 NOTE — TELEPHONE ENCOUNTER
Spoke with Nish Bach. She stated she would fax the denial letter to us. She stated the OPICs do not do the appeals, the physician's office does. All OPICs are under [facility] and should be stated as such for the appeal. She stated to let her know when Jamia Wilmar is approved and she can reschedule Mrs. Kaylee Ladd.

## 2018-04-13 NOTE — TELEPHONE ENCOUNTER
Spoke with Augusta Chance from [employer]. She stated that they needed a appeal for Tysbari. Appeal form printed from and prepped for completion by Dr. Jorge Lindsay.

## 2018-04-16 ENCOUNTER — TELEPHONE (OUTPATIENT)
Dept: NEUROLOGY | Age: 33
End: 2018-04-16

## 2018-04-16 DIAGNOSIS — G35 MS (MULTIPLE SCLEROSIS) (HCC): Primary | ICD-10-CM

## 2018-04-16 NOTE — TELEPHONE ENCOUNTER
Received a call from Dr. Carly Jewell for a [id number] regarding Acthar denial.    Basically Queta John will not approve acthar except for is FDA indication. She told me to look up their policy Atena Acthar 646 and ready it. So it is unclear to me why a peer to peer was needed since she has no authority to change the outcome. There is an 800 number on her card and ask for an appeal, and indicate what medical literature is missing from Eveline's review three months ago that would support using Acthar. I thanked her for wasting my time and hers.

## 2018-04-16 NOTE — TELEPHONE ENCOUNTER
Pt requesting a returned phone call in regards to the Tysabri and Acthar rejections and what the next plan of action is.

## 2018-04-19 NOTE — TELEPHONE ENCOUNTER
----- Message from Royce Skyler sent at 4/19/2018  8:42 AM EDT -----  Regarding: Dr. Skylar Cazares  Pt has been off medication for 2 months and needs to know what was recommended to the carrier regarding therapy. The prescription request that was submitted was denied. Pt would like to know why the therapy was denied and what the plan of action is. Pt left a message on Monday, but did not get a call back. Pt would like a call back today. Pt can be reached at [phone].

## 2018-04-19 NOTE — TELEPHONE ENCOUNTER
David Brandon - Please call pt: I spoke with her insurance company regarding Acthar and that flat out refuse to pay for it. I can appeal with a letter citing any \"new literature\" that I have found and they may consider it. Bottom line is, her insurance is not going to cover Acthar b/c it is not FDA approved for MS, even though there are certain situations, like hers, in which we have no other choice. They also declined Tysabri b/c she has not failed three other DMTs on their list of approved drugs. I have sent in an appeal for her insurance company to approve Tysabri and have not heard back. I spoke with the rep from Tysabri and he said if the appeal is declined, then the company will give her free drug. So, we can continue down this road or we can go back to Aubagio.

## 2018-04-20 NOTE — TELEPHONE ENCOUNTER
----- Message from Dominguez Breana sent at 4/19/2018  5:22 PM EDT -----  Regarding: /Telephone  Pt wanted to speak with nurse about the message that she has been sending. Pt stated this is her third message. Pt wanted to know what was going on with the medication that was denied. If it was being appeal or is there some other approach that is being taken. Best contact number is [phone].

## 2018-04-20 NOTE — TELEPHONE ENCOUNTER
Spoke with patient, informed her per -    I spoke with her insurance company regarding Acthar and that flat out refuse to pay for it. I can appeal with a letter citing any \"new literature\" that I have found and they may consider it. Bottom line is, her insurance is not going to cover Acthar b/c it is not FDA approved for MS, even though there are certain situations, like hers, in which we have no other choice.      They also declined Tysabri b/c she has not failed three other DMTs on their list of approved drugs. I have sent in an appeal for her insurance company to approve Tysabri and have not heard back. I spoke with the rep from Tysabri and he said if the appeal is declined, then the company will give her free drug. So, we can continue down this road or we can go back to [facility]. She wanted to know how long the appeal for Tysabri would take and how long it would take if denied to get medication.

## 2018-04-23 ENCOUNTER — TELEPHONE (OUTPATIENT)
Dept: NEUROLOGY | Age: 33
End: 2018-04-23

## 2018-04-23 NOTE — TELEPHONE ENCOUNTER
----- Message from Zane Greene sent at 4/23/2018  2:56 PM EDT -----  Regarding: Dr Dara Poole from [place] [address] [phone], waitng on a copy pf the denial  Letter and a letter of medical necessity  (f) [phone] for pt to be faxed

## 2018-04-23 NOTE — TELEPHONE ENCOUNTER
Dr Steven Vazquez,    Looks like you already started an appeal for Tysabri. Do you want to continue that or submit info for the  Acthar?

## 2018-04-23 NOTE — TELEPHONE ENCOUNTER
So since it could take another 30 days and no guarantee it will get approved, pt willing to go back on the Aubagio if you think it is the best idea. Please advise.

## 2018-04-24 PROBLEM — G35 MS (MULTIPLE SCLEROSIS) (HCC): Status: ACTIVE | Noted: 2018-04-24

## 2018-04-24 NOTE — TELEPHONE ENCOUNTER
Called pt and discussed situation. We are both frustrated with her insurance company. She will go ahead and restart Aubagio so that she is not off all DMTs for 2-3 months while we wait for her insurance company to make a decision. Lila Stapleton - 1) I have ordered monitoring labs, please process and let pt know what she needs to do. CMP and CBC with diff monthly starting 5/24/18 x 6 months. 2) I printed Rx for Aubagio. Hopefully you can send back to the specialty pharmacy to restart her Rx.

## 2018-04-24 NOTE — TELEPHONE ENCOUNTER
Never mind on both accounts, though Tysabri and Acthar are unrelated. While insurance and drug companies are pondering my patient's medications, she is going untreated.   See phone note 4/16/18

## 2018-04-25 ENCOUNTER — TELEPHONE (OUTPATIENT)
Dept: NEUROLOGY | Age: 33
End: 2018-04-25

## 2018-04-25 NOTE — TELEPHONE ENCOUNTER
Pt will call [facility] and have them resume shipment of her meds. Please print lab order for [facility] as pt 's insurance requires her to go there and she will be going [place] a draw station to get her labs done.

## 2018-04-25 NOTE — TELEPHONE ENCOUNTER
----- Message from Katharine Calderon sent at 4/25/2018  2:03 PM EDT -----  Regarding: Dr. Sonia Nixon,  with Acthar support would like a call back from the nurse regarding denial letter and a letter of  medical necessity  for the appeal. Lacey Felix is checking on the status. Lacey Felix left a message on Monday and did not get a response. This is her second message. Lacey Felix can be reached at [phone].

## 2018-05-07 ENCOUNTER — TELEPHONE (OUTPATIENT)
Dept: NEUROLOGY | Age: 33
End: 2018-05-07

## 2018-05-08 NOTE — TELEPHONE ENCOUNTER
Lucian Dior I cancelled the Tysabri order. See previous three phone notes and please sign the phone notes, they are all open.

## 2018-05-31 ENCOUNTER — DOCUMENTATION ONLY (OUTPATIENT)
Dept: NEUROLOGY | Age: 33
End: 2018-05-31

## 2018-06-11 ENCOUNTER — TELEPHONE (OUTPATIENT)
Dept: NEUROLOGY | Age: 33
End: 2018-06-11

## 2018-06-11 NOTE — TELEPHONE ENCOUNTER
Patient needs a new script for blood work and she wants to know what her test results were.    Please call

## 2018-06-14 NOTE — TELEPHONE ENCOUNTER
Justin Umana - Can you take care of this issue. Again, please call pt: I received labs from [address] [place] [address] on 5/24/18 and these were normal (see Documentation encounter dated 5/31/18). And, if anyone would look in the system, it appears that I have already done this work and therefore this could have been handled without my involvement. I believe there are standing orders in the system for her monthly CBC and CMP.

## 2018-06-14 NOTE — TELEPHONE ENCOUNTER
Frankie for pt stating labs were unremarkable and that we will talk to Dr. Orlando Lam about putting standing orders in system.

## 2018-06-27 ENCOUNTER — OFFICE VISIT (OUTPATIENT)
Dept: NEUROLOGY | Age: 33
End: 2018-06-27

## 2018-06-27 VITALS
BODY MASS INDEX: 29.55 KG/M2 | HEART RATE: 83 BPM | DIASTOLIC BLOOD PRESSURE: 80 MMHG | WEIGHT: 166.8 LBS | HEIGHT: 63 IN | SYSTOLIC BLOOD PRESSURE: 114 MMHG | RESPIRATION RATE: 16 BRPM | OXYGEN SATURATION: 98 %

## 2018-06-27 DIAGNOSIS — G35 MS (MULTIPLE SCLEROSIS) (HCC): Primary | ICD-10-CM

## 2018-06-27 NOTE — MR AVS SNAPSHOT
[place] [address] [address] [place] [address] 
[phone] Patient: Nasrin Bermudez MRN: [id number] :1985 Visit Information Date & Time Provider Department Dept. Phone Encounter #  
 2018  8:40 AM Gayatri Morin MD [facility] at [address] [id number] Follow-up Instructions Return in about 6 months (around 2018). Upcoming Health Maintenance Date Due DTaP/Tdap/Td series (1 - Tdap) 2005 PAP AKA CERVICAL CYTOLOGY 2005 Influenza Age 5 to Adult 2018 Allergies as of 2018  Review Complete On: 2018 By: Gayatri Morin MD  
 No Known Allergies Current Immunizations  Reviewed on 3/16/2018 No immunizations on file. Not reviewed this visit You Were Diagnosed With   
  
 Codes Comments MS (multiple sclerosis) ([facility] 75.)    -  Primary ICD-10-CM: G35 
ICD-9-CM: 533 Vitals BP Pulse Resp Height(growth percentile) Weight(growth percentile) SpO2  
 114/80 83 16 5' 3\" (1.6 m) 166 lb 12.8 oz (75.7 kg) 98% BMI OB Status Smoking Status 29.55 kg/m2 Having regular periods Never Smoker Vitals History BMI and BSA Data Body Mass Index Body Surface Area  
 29.55 kg/m 2 1.83 m 2 Preferred Pharmacy Pharmacy Name Phone [facility] [address] [phone] Your Updated Medication List  
  
   
This list is accurate as of 18  9:24 AM.  Always use your most recent med list.  
  
  
  
  
 COPAXONE 40 mg/mL injection Generic drug:  glatiramer FISH OIL PO Take  by mouth.  
  
 multivitamin tablet Commonly known as:  ONE A DAY Take 1 Tab by mouth daily. * Needle (Disp) 20 G 20 gauge x 1 1/2\" Ndle To be used with Acthar to pull medication from bottle * Needle (Disp) 25 G 25 gauge x 1\" Ndle To be used to inject Acthar subcutaneously  
  
 sour cherry extract 1,000 mg Cap Take 1 Cap by mouth daily. [facility] (28) 0.25-35 mg-mcg Tab Generic drug:  norgestimate-ethinyl estradiol Take 1 Tab by mouth daily. Syringe (Disposable) 3 mL Syrg To be used with Acthar  
  
 teriflunomide 14 mg Tab Commonly known as:  AUBAGIO Take 1 Tab by mouth daily. TURMERIC ROOT EXTRACT PO Take 1 Cap by mouth daily. VITAMIN B-12 1,000 mcg tablet Generic drug:  cyanocobalamin Take 1,000 mcg by mouth daily. VITAMIN D3 1,000 unit Cap Generic drug:  cholecalciferol Take 1,000 Units by mouth daily. * Notice: This list has 2 medication(s) that are the same as other medications prescribed for you. Read the directions carefully, and ask your doctor or other care provider to review them with you. Follow-up Instructions Return in about 6 months (around 12/27/2018). To-Do List   
 monthly Lab:  CBC WITH AUTOMATED DIFF   
  
 monthly Lab:  METABOLIC PANEL, COMPREHENSIVE Patient Instructions A Healthy Lifestyle: Care Instructions Your Care Instructions A healthy lifestyle can help you feel good, stay at a healthy weight, and have plenty of energy for both work and play. A healthy lifestyle is something you can share with your whole family. A healthy lifestyle also can lower your risk for serious health problems, such as high blood pressure, heart disease, and diabetes. You can follow a few steps listed below to improve your health and the health of your family. Follow-up care is a key part of your treatment and safety. Be sure to make and go to all appointments, and call your doctor if you are having problems. It's also a good idea to know your test results and keep a list of the medicines you take. How can you care for yourself at home? · Do not eat too much sugar, fat, or fast foods.  You can still have dessert and treats now and then. The goal is moderation. · Start small to improve your eating habits. Pay attention to portion sizes, drink less juice and soda pop, and eat more fruits and vegetables. ¨ Eat a healthy amount of food. A 3-ounce serving of meat, for example, is about the size of a deck of cards. Fill the rest of your plate with vegetables and whole grains. ¨ Limit the amount of soda and sports drinks you have every day. Drink more water when you are thirsty. ¨ Eat at least 5 servings of fruits and vegetables every day. It may seem like a lot, but it is not hard to reach this goal. A serving or helping is 1 piece of fruit, 1 cup of vegetables, or 2 cups of leafy, raw vegetables. Have an apple or some carrot sticks as an afternoon snack instead of a candy bar. Try to have fruits and/or vegetables at every meal. 
· Make exercise part of your daily routine. You may want to start with simple activities, such as walking, bicycling, or slow swimming. Try to be active 30 to 60 minutes every day. You do not need to do all 30 to 60 minutes all at once. For example, you can exercise 3 times a day for 10 or 20 minutes. Moderate exercise is safe for most people, but it is always a good idea to talk to your doctor before starting an exercise program. 
· Keep moving. [place] [employer] the lawn, work in the garden, or [employer]. Take the stairs instead of the elevator at work. · If you smoke, quit. People who smoke have an increased risk for heart attack, stroke, cancer, and other lung illnesses. Quitting is hard, but there are ways to boost your chance of quitting tobacco for good. ¨ Use nicotine gum, patches, or lozenges. ¨ Ask your doctor about stop-smoking programs and medicines. ¨ Keep trying.  
In addition to reducing your risk of diseases in the future, you will notice some benefits soon after you stop using tobacco. If you have shortness of breath or asthma symptoms, they will likely get better within a few weeks after you quit. · Limit how much alcohol you drink. Moderate amounts of alcohol (up to 2 drinks a day for men, 1 drink a day for women) are okay. But drinking too much can lead to liver problems, high blood pressure, and other health problems. Family health If you have a family, there are many things you can do together to improve your health. · Eat meals together as a family as often as possible. · Eat healthy foods. This includes fruits, vegetables, lean meats and dairy, and whole grains. · Include your family in your fitness plan. Most people think of activities such as jogging or tennis as the way to fitness, but there are many ways you and your family can be more active. Anything that makes you breathe hard and gets your heart pumping is exercise. Here are some tips: 
¨ Walk to do errands or to take your child to school or the bus. ¨ Go for a family bike ride after dinner instead of watching TV. Where can you learn more? Go to http://matthew-efraín.info/. Enter F400 in the search box to learn more about \"A Healthy Lifestyle: Care Instructions. \" Current as of: May 12, 2017 Content Version: 11.4 © 9506-0657 [employer]. Care instructions adapted under license by [employer] (which disclaims liability or warranty for this information). If you have questions about a medical condition or this instruction, always ask your healthcare professional. [place] [address] any warranty or liability for your use of this information. Introducing [facility]! Vandana Chance introduces medidametrics patient portal. Now you can access parts of your medical record, email your doctor's office, and request medication refills online. 1. In your internet browser, go to [email or web address] 2. Click on the First Time User? Click Here link in the Sign In box. You will see the New Member Sign Up page. 3. Enter your Win[employer] Access Code exactly as it appears below. You will not need to use this code after youve completed the sign-up process. If you do not sign up before the expiration date, you must request a new code. · [employer] Access Code: [email or web address] Expires: 7/4/2018  2:15 PM 
 
4. Enter the last four digits of your Social Security Number (xxxx) and Date of Birth (mm/dd/yyyy) as indicated and click Submit. You will be taken to the next sign-up page. 5. Create a WinDensity ID. This will be your [employer] login ID and cannot be changed, so think of one that is secure and easy to remember. 6. Create a [employer] password. You can change your password at any time. 7. Enter your Password Reset Question and Answer. This can be used at a later time if you forget your password. 8. Enter your e-mail address. You will receive e-mail notification when new information is available in [address]. 9. Click Sign Up. You can now view and download portions of your medical record. 10. Click the Download Summary menu link to download a portable copy of your medical information. If you have questions, please visit the Frequently Asked Questions section of the [employer] website. Remember, [employer] is NOT to be used for urgent needs. For medical emergencies, dial 911. Now available from your iPhone and Android! Please provide this summary of care documentation to your next provider. Your primary care clinician is listed as JeovanyDevora Gomez. If you have any questions after today's visit, please call [phone].

## 2018-06-27 NOTE — PROGRESS NOTES
Ms. Stephen Deepali is here to follow up MS. She has no medical complaints at this time. Depression screening was done on this pt.

## 2018-06-27 NOTE — PROGRESS NOTES
Neurology Progress Note      HISTORY PROVIDED BY: patient    Chief Complaint:   Chief Complaint   Patient presents with    Multiple Sclerosis      Subjective:   Pt is a 28 y.o. RH female last seen in clinic on 4/5/18 in f/u for RRMS diagnosed after presenting with sudden onset of sensory changes in April, 2017,  tingling in bottom of feet that moved up her legs with walking, hands bilaterally in 4th and 5th digits, moving across to other fingers, tingling from bra line down to just above her pubic bone, around to sides and area felt tight. MRI of C and T spine with contrast on 4/15/17 with abnormal signal with enhancement and possible subtle mass effect in the cord at C4. MRI brain w/wo contrast 4/17/17 with multiple T2/Flair hyperintensities in the PV and subcortical WM, per radiology <9 lesions, with enhancing lesion in the anterior right temporal lobe. Sxs improved after course of IV Solumedrol. Started Copaxone 6/30/17, but had exacerbation 2/6/18 with multiple new enhancing lesions on MRI brain. S/p IV steroids. Had another exacerbation 3/12/18 with MRI brain 3/13/18 with significant progression of lesions wedith multiple enhancing lesions just since her last MRI brain one month prior and MRI C-spine with improvement in hyperintensity and cord expansion at C4. Exam was unremarkable. Given aggressive nature of her disease, recommend starting Tysabri. I am not certain how to explain spell of heart racing and -200. Recommended she seek medical attention immediately if it occurs again, it may just be a panic attack, but with no prior h/o panic attacks, other etiology should be excluded. Pt instructed to f/u with Ob or PCP regarding abnormal periods. She returns for f/u. She called the clinic the day after her last appointment reporting left hand and foot tingling/numbness, blurred vision- like things are jumping around, L>R eye, and dysarthria.  Given new sxs after just completing a course of IV solumedrol, I recommended Acthar 80mg SQ daily x 5 days, with plan for an additional 5 days depending on sxs. Her insurance disagreed with my clinical decision to give Acthar, so after initial sample dose, she could no longer receive Acthar. Her insurance company also disagreed with my clinical decision to start Tysabri, insisting she fail 3 less effective therapies first.  She was started on Aubagio 4/24/18. 1 month monitoring labs 5/24/18 were normal.   No new sxs since call on 4/6/18. She feels like her vision has decreased and her left leg will act up from time to time. Past Medical History:   Diagnosis Date    Multiple sclerosis ([address]     LP on 4/18/17 with normal OP 21mmHg, CSF studies were normal except +OGB and elevated IgG Index. Past Surgical History:   Procedure Laterality Date    HX ROTATOR CUFF REPAIR Right 1999      Social History     Social History    Marital status:      Spouse name: N/A    Number of children: N/A    Years of education: N/A     Occupational History     at [address] [place] [address] History Main Topics    Smoking status: Never Smoker    Smokeless tobacco: Never Used      Comment: smokes socially    Alcohol use 1.2 - 1.8 oz/week     2 - 3 Standard drinks or equivalent per week    Drug use: No    Sexual activity: Not on file     Other Topics Concern    Not on file     Social History Narrative    Lives in [place] with      Family History   Problem Relation Age of Onset    MS Mother      Dec 50yo    Other Father      Estranged    No Known Problems Sister     No Known Problems Brother     Other Brother      Hep C         Objective:   Review of Systems : Per HPI, o/w neg    No Known Allergies     Meds:  Outpatient Medications Prior to Visit   Medication Sig Dispense Refill    teriflunomide (AUBAGIO) 14 mg tab Take 1 Tab by mouth daily.  30 Tab 11    Syringe, Disposable, 3 mL syrg To be used with Acthar 6 Syringe 0    Needle, Disp, 20 G 20 gauge x 1 1/2\" ndle To be used with Acthar to pull medication from bottle 6 Each 0    Needle, Disp, 25 G 25 gauge x 1\" ndle To be used to inject Acthar subcutaneously 6 Each 0    cyanocobalamin (VITAMIN B-12) 1,000 mcg tablet Take 1,000 mcg by mouth daily.  DOCOSAHEXANOIC ACID/EPA (FISH OIL PO) Take  by mouth.  multivitamin (ONE A DAY) tablet Take 1 Tab by mouth daily.  sour cherry extract 1,000 mg cap Take 1 Cap by mouth daily.  cholecalciferol (VITAMIN D3) 1,000 unit cap Take 1,000 Units by mouth daily.  TURMERIC ROOT EXTRACT PO Take 1 Cap by mouth daily.  norgestimate-ethinyl estradiol (SPRINTEC, 28,) 0.25-35 mg-mcg tab Take 1 Tab by mouth daily.  COPAXONE 40 mg/mL injection        No facility-administered medications prior to visit. Imaging:  MRI Results (most recent):    Results from Hospital Encounter encounter on 03/26/18   MRI CERV SPINE W WO CONT   Narrative EXAM:  MRI CERV SPINE W WO CONT    INDICATION:  Pt with MS eval for new or enhancing lesions. .    COMPARISON: 4/15/2017    TECHNIQUE: MR imaging of the cervical spine was performed using the following  sequences: sagittal T1, T2, STIR;  axial T2, T1 prior to and following contrast  administration. CONTRAST:  14 mL of ProHance gadolinium. FINDINGS:    There is normal alignment of the cervical spine. Vertebral body heights are  maintained. Marrow signal is normal.    The craniocervical junction is intact. Abnormal T2 hyperintense signal in the  cord previously described at the C3-4 level has become less T2 hyperintense. In  addition, there is no longer any associated enhancement or expansion of the cord  at this level. There are no new lesions. There is no pathologic intrathecal  enhancement. The paraspinal soft tissues are within normal limits. C2-C3:  No herniation or stenosis. C3-C4:  Minimal right-sided uncovertebral joint hypertrophy.  No neuroforaminal  narrowing or spinal canal stenosis. C4-C5:  Minimal right-sided uncovertebral joint hypertrophy. No neuroforaminal  narrowing or spinal canal stenosis. C5-C6:  No herniation or stenosis. C6-C7:  No herniation or stenosis. C7-T1:  No herniation or stenosis. Impression IMPRESSION:  T2 hyperintense focus in the cord at the C4 level demonstrates decrease in  intensity of T2 signal, as well as resolution of mild expansion of the cord at  this level. No associated enhancement. No new lesions. CT Results (most recent):    Results from Hospital Encounter encounter on 04/09/17   CT HEAD WO CONT   Narrative EXAM:  CT HEAD WO CONT    INDICATION:   Head trauma, closed, mild, GCS >= 13, no risk factors, neuro exam  normal    COMPARISON: None. TECHNIQUE: Unenhanced CT of the head was performed using 5 mm images. Brain and  bone windows were generated. CT dose reduction was achieved through use of a  standardized protocol tailored for this examination and automatic exposure  control for dose modulation. FINDINGS:  There is no extra-axial fluid collection hemorrhage shift or masses her. Ventricles are normal in size and midline. Impression impression: No acute changes. Reviewed records in [employer] [facility] tab today    Lab Review   Results for orders placed or performed in visit on 03/13/18   EDGAR VIRUS DNA BY PCR, QL   Result Value Ref Range    EDGAR VIRUS DNA,PCR (WHOLE BLOOD) Negative Negative        Exam:  Visit Vitals    /80    Pulse 83    Resp 16    Ht 5' 3\" (1.6 m)    Wt 75.7 kg (166 lb 12.8 oz)    SpO2 98%    BMI 29.55 kg/m2     General:  Alert, cooperative, no distress. Head:  Normocephalic, without obvious abnormality, atraumatic. Respiratory:  Heart:   Non labored breathing  Regular rate and rhythm, no murmurs   Neck:      Extremities: Warm, no cyanosis or edema. Pulses: 2+ radial pulses. Neurologic:  MS: Alert and oriented x 4, speech intact. Language intact. Attention and fund of knowledge appropriate. Recent and remote memory intact. Cranial Nerves:  II: visual fields VFF   II: pupils Equal, round, reactive to light   II: optic disc    III,VII: ptosis none   III,IV,VI: extraocular muscles  EOMI, no nystagmus or diplopia   V: facial light touch sensation     VII: facial muscle function   symmetric   VIII: hearing intact   IX: soft palate elevation  normal   XI: trapezius strength     XI: sternocleidomastoid strength    XII: tongue  Midline     Motor: normal bulk and tone, no tremor              Strength: 5/5 throughout, no PD  Sensory:   Coordination: FTN, HTS, and MIL intact  Gait: normal gait, able to tandem walk  Reflexes: 2+ symmetric, except 1+ left knee       Assessment/Plan   Pt is a 28 y.o. RH female with RRMS diagnosed after presenting with sudden onset of sensory changes in April, 2017,  tingling in bottom of feet that moved up her legs with walking, hands bilaterally in 4th and 5th digits, moving across to other fingers, tingling from bra line down to just above her pubic bone, around to sides and area felt tight. MRI of C and T spine with contrast on 4/15/17 with abnormal signal with enhancement and possible subtle mass effect in the cord at C4. MRI brain w/wo contrast 4/17/17 with multiple T2/Flair hyperintensities in the PV and subcortical WM, per radiology <9 lesions, with enhancing lesion in the anterior right temporal lobe. Sxs improved after course of IV Solumedrol. Started Copaxone 6/30/17, but had exacerbation 2/6/18 with multiple new enhancing lesions on MRI brain. S/p IV steroids. Had another exacerbation 3/12/18 with MRI brain 3/13/18 with significant progression of lesions wedith multiple enhancing lesions just since her last MRI brain one month prior and MRI C-spine with improvement in hyperintensity and cord expansion at C4. Had another exacerbation 4/6/18, treated with Acthar x 5 days.    Switched to [facility] 4/24/18 after her insurance company denied treatment with Tysabri. Exam is unremarkable.    -Continue Aubagio  -Monthly CMP and CBC with diff x 5 more months. -F/u in clinic in 6 months, instructed to call in the interim if needed. ICD-10-CM ICD-9-CM    1. MS (multiple sclerosis) ([facility] [id number] METABOLIC PANEL, COMPREHENSIVE      CBC WITH AUTOMATED DIFF       Signed:   Lester West MD  6/27/2018

## 2018-06-27 NOTE — PATIENT INSTRUCTIONS

## 2018-07-05 ENCOUNTER — TELEPHONE (OUTPATIENT)
Dept: NEUROLOGY | Age: 33
End: 2018-07-05

## 2018-07-05 NOTE — TELEPHONE ENCOUNTER
Re: Kristin MOON request fax from [facility]. Per Dr. Willye Councilman office visit notes, hard to tell if she wants pt to continue medication or not. Most recent office note is unsigned. Forward to nurse. Does Dr. Ladonna Munroe want this pt to continue this medication?

## 2018-07-10 ENCOUNTER — DOCUMENTATION ONLY (OUTPATIENT)
Dept: NEUROLOGY | Age: 33
End: 2018-07-10

## 2018-07-11 NOTE — TELEPHONE ENCOUNTER
Pt calling because she was discharged from the hospital on 2/7/2018 and she is was told to f/u with Dr. Artem Au within two weeks for her MRI results. Please call back, next available isn't until the end of March. normal mouth and gums/moist

## 2018-07-20 DIAGNOSIS — G35 MS (MULTIPLE SCLEROSIS) (HCC): ICD-10-CM

## 2018-07-28 LAB
ALB/GLOBRATIO, 58C: 1.7 (CALC) (ref 1–2.5)
ALBUMIN SERPL-MCNC: 3.8 G/DL (ref 3.6–5.1)
ALP SERPL-CCNC: 27 U/L (ref 33–115)
ALT SERPL-CCNC: 14 U/L (ref 6–29)
AST SERPL W P-5'-P-CCNC: 15 U/L (ref 10–30)
BILIRUB SERPL-MCNC: 0.4 MG/DL (ref 0.2–1.2)
BUN SERPL-MCNC: 9 MG/DL (ref 7–25)
BUN/CREATININE RATIO,BUCR: ABNORMAL (CALC) (ref 6–22)
CALCIUM SERPL-MCNC: 9 MG/DL (ref 8.6–10.2)
CHLORIDE SERPL-SCNC: 106 MMOL/L (ref 98–110)
CO2 SERPL-SCNC: 26 MMOL/L (ref 20–31)
CREAT SERPL-MCNC: 0.55 MG/DL (ref 0.5–1.1)
GLOBULIN,GLOB: 2.2 G/DL (CALC) (ref 1.9–3.7)
GLUCOSE SERPL-MCNC: 84 MG/DL (ref 65–139)
POTASSIUM SERPL-SCNC: 4.1 MMOL/L (ref 3.5–5.3)
PROT SERPL-MCNC: 6 G/DL (ref 6.1–8.1)
SODIUM SERPL-SCNC: 138 MMOL/L (ref 135–146)

## 2018-07-30 ENCOUNTER — TELEPHONE (OUTPATIENT)
Dept: NEUROLOGY | Age: 33
End: 2018-07-30

## 2018-08-06 ENCOUNTER — TELEPHONE (OUTPATIENT)
Dept: NEUROLOGY | Age: 33
End: 2018-08-06

## 2018-08-06 NOTE — TELEPHONE ENCOUNTER
I spoke with pt and she has had her labs done at [facility]. Per [facility], she had a CBC done in the end of June and had a CMP done in the end of July. [facility] agrees to fax labs at this time. Yolanda Monique, can you please look into prior auth for this patient's medication?  Thanks

## 2018-08-06 NOTE — TELEPHONE ENCOUNTER
----- Message from Marcus Davis sent at 8/6/2018  2:23 PM EDT -----  Regarding: Dr. Cata Austin  Pt stated, she received a letter from [facility], in regards to medication \"Aubagio\" 14 MG. Pt stated, an authorization is required for insurance to cover the cost.  Pt requesting for the nurse to call [phone] to [facility]. Pt stated, she gets monthly blood work done while on medication. Pt requesting to speak with the nurse and get lab results. Best contact number [phone].

## 2018-08-07 NOTE — TELEPHONE ENCOUNTER
Re: Jerad Lantigua fax from [place] for additional information. S/w Butch Staton @ [facility] to provide additional info. Renewed PA over the phone. Per Mitzy Padilla PA approved. Auth # [id number]. Auth good 18 - 19. Per Mitzy Padilla pt's auth on file does not  until 18. Renewed Bereket Linder will be in effect at that time. Faxed approval to [facility]. Fax confirmation received 18.

## 2018-08-08 ENCOUNTER — TELEPHONE (OUTPATIENT)
Dept: NEUROLOGY | Age: 33
End: 2018-08-08

## 2018-08-08 NOTE — TELEPHONE ENCOUNTER
----- Message from [facility] sent at 8/8/2018 12:49 PM EDT -----  Regarding: Dr. Mitzi Crane  Pt [phone] is returning a call she recvd from the practice.

## 2018-08-08 NOTE — TELEPHONE ENCOUNTER
Pt calling re lab results. She said she is waiting for two months worth of lab results. She said she is unable to take her medication depending on her results.  Please call back

## 2018-08-08 NOTE — TELEPHONE ENCOUNTER
I called [facility] and they didn't draw a CBC on patient. I left message for patient to call back. Need to know which [facility] location she went to and I will refax the standing orders.

## 2018-08-08 NOTE — TELEPHONE ENCOUNTER
I spoke with patient and she gave me [facility] contact info, phone [phone] and fax info [phone]. Called [facility] and they didn't do CBC. I refaxed orders these standing orders to [facility] with instructions to fax results once received. I also wrote on order ta patient plans to get her CBC done tomorrow since it wasn't drawn previously. Patient states she has been taking her Aubagio regularly.

## 2018-08-08 NOTE — TELEPHONE ENCOUNTER
Elena Toscano -   1) Is the pt still taking the Aubagio? She should not be holding it until hearing about her lab results. 2) She gets her labs done at [address] [place] [address] (as you noted in the last phone note where you state you requested they fax them to us.) Do you see CMP and CBC with diff results from July or August from [address] [place] [address]?  I don't see them. There is a CMP dated 7/27/18 from [address] [place] [address] under date 4/25/18 that is normal - this was never sent to my desktop, so this is the first time I am seeing it.    3) If you look at my documentation note from 7/10/18, the last labs I have from [address] [place] [address] are dated 6/29/18 and they are normal.

## 2018-08-10 LAB
ABSOLUTE BANDS, 67058: ABNORMAL
ABSOLUTE BLASTS: ABNORMAL
ABSOLUTE METAMYELOCYTES, 900360: ABNORMAL
ABSOLUTE MYELOCYTES: ABNORMAL
ABSOLUTE NRBC,ANRBC: ABNORMAL
ABSOLUTE PROMYELOCYTES: ABNORMAL
BANDS,BANDS: ABNORMAL
BASOPHILS # BLD: 18 CELLS/UL (ref 0–200)
BASOPHILS NFR BLD: 0.3 %
BLASTS,BLAST: ABNORMAL
COMMENT(S): ABNORMAL
EOSINOPHIL # BLD: 41 CELLS/UL (ref 15–500)
EOSINOPHIL NFR BLD: 0.7 %
ERYTHROCYTE [DISTWIDTH] IN BLOOD BY AUTOMATED COUNT: 11.7 % (ref 11–15)
HCT VFR BLD AUTO: 37.9 % (ref 35–45)
HGB BLD-MCNC: 12.8 G/DL (ref 11.7–15.5)
LYMPHOCYTES # BLD: 1782 CELLS/UL (ref 850–3900)
LYMPHOCYTES NFR BLD: 30.2 %
MCH RBC QN AUTO: 30 PG (ref 27–33)
MCHC RBC AUTO-ENTMCNC: 33.8 G/DL (ref 32–36)
MCV RBC AUTO: 88.8 FL (ref 80–100)
METAMYELOCYTES,METAS: ABNORMAL
MONOCYTES # BLD: 425 CELLS/UL (ref 200–950)
MONOCYTES NFR BLD: 7.2 %
MYELOCYTES,MYELO: ABNORMAL
NEUTROPHILS # BLD AUTO: 3634 CELLS/UL (ref 1500–7800)
NEUTROPHILS # BLD: 61.6 %
NRBC: ABNORMAL
PLATELET # BLD AUTO: 174 THOUSAND/UL (ref 140–400)
PMV BLD AUTO: 13.1 FL (ref 7.5–12.5)
PROMYELOCYTES,PRO: ABNORMAL
RBC # BLD AUTO: 4.27 MILLION/UL (ref 3.8–5.1)
REACTIVE LYMPHS: ABNORMAL
WBC # BLD AUTO: 5.9 THOUSAND/UL (ref 3.8–10.8)

## 2018-08-10 NOTE — TELEPHONE ENCOUNTER
We received CBC. It has been reviewed by Dr. Abdullahi Carballo. It was normal and I informed patient, per Dr. Abdullahi Carballo.

## 2018-09-02 ENCOUNTER — TELEPHONE (OUTPATIENT)
Dept: NEUROLOGY | Age: 33
End: 2018-09-02

## 2018-09-03 NOTE — TELEPHONE ENCOUNTER
Leandro - Please call pt:   I received CMP an CBC with diff from [employer] dated 8/29/18 - they look good.

## 2018-09-06 DIAGNOSIS — G35 MS (MULTIPLE SCLEROSIS) (HCC): ICD-10-CM

## 2018-09-28 ENCOUNTER — TELEPHONE (OUTPATIENT)
Dept: NEUROLOGY | Age: 33
End: 2018-09-28

## 2018-09-28 NOTE — TELEPHONE ENCOUNTER
Maria L Rodriguez - Please call pt:   I received CMP an CBC with diff from [facility] dated 9/28/18 - they look good.

## 2018-11-13 ENCOUNTER — TELEPHONE (OUTPATIENT)
Dept: NEUROLOGY | Age: 33
End: 2018-11-13

## 2018-11-13 NOTE — TELEPHONE ENCOUNTER
----- Message from Nelia Modi sent at 11/13/2018  2:56 PM EST -----  Regarding: Dr. Henry Wade  Pt requested lab results from 11/06/18. Best contact number [phone].

## 2018-11-13 NOTE — TELEPHONE ENCOUNTER
I spoke with patient and she had these done at [facility] on [address]. Requested results to be faxed.

## 2018-12-07 ENCOUNTER — TELEPHONE (OUTPATIENT)
Dept: NEUROLOGY | Age: 33
End: 2018-12-07

## 2018-12-07 DIAGNOSIS — G35 MULTIPLE SCLEROSIS EXACERBATION (HCC): Primary | ICD-10-CM

## 2018-12-07 DIAGNOSIS — R20.2 PARESTHESIA OF BOTH FEET: ICD-10-CM

## 2018-12-07 NOTE — TELEPHONE ENCOUNTER
Patient denies any other symptoms at this time. I informed her that Dr. Carolyne Springer is out of the office at this time and patient stated this is ok. Dr. Carolyne Springer, please advise.

## 2018-12-10 ENCOUNTER — TELEPHONE (OUTPATIENT)
Dept: NEUROLOGY | Age: 33
End: 2018-12-10

## 2018-12-10 PROBLEM — R20.2 PARESTHESIA OF BOTH FEET: Status: ACTIVE | Noted: 2018-12-10

## 2018-12-10 NOTE — TELEPHONE ENCOUNTER
Returned pt calls. Pt reports onset of tingling in her feet last Wednesday, feel like heavy blocks. No weakness, no falls. Has stumbled. Has not moved up. No bowel or bladder changes. MRI T- spine ordered for ASAP. If positive, then will start steroids and may need to consider switch to another DMT. If neg, then can assess at f/u visit in 17 days unless has progression of sxs. Tim Isaacs - please see if you can facilitate getting this study done quickly.

## 2018-12-10 NOTE — TELEPHONE ENCOUNTER
----- Message from Nancy Morales sent at 12/10/2018 10:35 AM EST -----  Regarding: Dr Callaway/telephone  Pt's p) [phone], following up on message left Friday regarding tingling in her feet and numbness and wanted to know what she should do.

## 2018-12-12 ENCOUNTER — HOSPITAL ENCOUNTER (OUTPATIENT)
Dept: MRI IMAGING | Age: 33
Discharge: HOME | End: 2018-12-12
Payer: COMMERCIAL

## 2018-12-12 DIAGNOSIS — R20.2 PARESTHESIA OF BOTH FEET: ICD-10-CM

## 2018-12-12 DIAGNOSIS — G35 MULTIPLE SCLEROSIS EXACERBATION (HCC): ICD-10-CM

## 2018-12-12 PROCEDURE — 72157 MRI CHEST SPINE W/O & W/DYE: CPT

## 2018-12-12 PROCEDURE — A9575 INJ GADOTERATE MEGLUMI 0.1ML: HCPCS | Performed by: RADIOLOGY

## 2018-12-12 RX ORDER — GADOTERATE MEGLUMINE 376.9 MG/ML
15 INJECTION INTRAVENOUS
Status: COMPLETED | OUTPATIENT
Start: 2018-12-12 | End: 2018-12-12

## 2018-12-12 RX ORDER — SODIUM CHLORIDE 0.9 % (FLUSH) 0.9 %
10 SYRINGE (ML) INJECTION
Status: COMPLETED | OUTPATIENT
Start: 2018-12-12 | End: 2018-12-12

## 2018-12-12 RX ADMIN — GADOTERATE MEGLUMINE 15 ML: 376.9 INJECTION INTRAVENOUS at 10:00

## 2018-12-12 RX ADMIN — Medication 10 ML: at 10:00

## 2018-12-14 ENCOUNTER — TELEPHONE (OUTPATIENT)
Dept: NEUROLOGY | Age: 33
End: 2018-12-14

## 2018-12-14 RX ORDER — PREDNISONE 10 MG/1
TABLET ORAL
Qty: 27 TAB | Refills: 0 | Status: SHIPPED | OUTPATIENT
Start: 2018-12-14 | End: 2019-03-22 | Stop reason: SDUPTHER

## 2018-12-14 NOTE — TELEPHONE ENCOUNTER
----- Message from Tom Alexander sent at 12/14/2018 10:29 AM EST -----  Regarding: Dr. Amina Sher  Pt is calling for mri and blood work results. [phone].

## 2018-12-14 NOTE — TELEPHONE ENCOUNTER
Received results from [address] [place] [address] and placed in folder on Dr. Rafael Gomez desk.

## 2018-12-14 NOTE — TELEPHONE ENCOUNTER
MRI T spine with new T10-T11 lesion since April 2017, but no enhancement. Pt's sxs have not changed. Tingling is constant, not like RLS. No pain. Recommend trial of oral steroids to see if this helps. Will reassess at f/u appt in 2 weeks. Monitoring labs for Aubagio received from [facility] date 12/7/18 - CBC with diff and CMP are unremarkable.

## 2018-12-27 ENCOUNTER — OFFICE VISIT (OUTPATIENT)
Dept: NEUROLOGY | Age: 33
End: 2018-12-27

## 2018-12-27 VITALS
OXYGEN SATURATION: 16 % | DIASTOLIC BLOOD PRESSURE: 60 MMHG | SYSTOLIC BLOOD PRESSURE: 102 MMHG | HEIGHT: 63 IN | BODY MASS INDEX: 30.69 KG/M2 | HEART RATE: 69 BPM | RESPIRATION RATE: 97 BRPM | WEIGHT: 173.2 LBS

## 2018-12-27 DIAGNOSIS — G35 MS (MULTIPLE SCLEROSIS) (HCC): Primary | ICD-10-CM

## 2018-12-27 DIAGNOSIS — R20.2 PARESTHESIA OF BOTH FEET: ICD-10-CM

## 2018-12-27 RX ORDER — GUAIFENESIN 600 MG/1
600 TABLET, EXTENDED RELEASE ORAL 2 TIMES DAILY
COMMUNITY
End: 2020-12-22 | Stop reason: ALTCHOICE

## 2018-12-27 NOTE — PATIENT INSTRUCTIONS
A Healthy Lifestyle: Care Instructions  Your Care Instructions    A healthy lifestyle can help you feel good, stay at a healthy weight, and have plenty of energy for both work and play. A healthy lifestyle is something you can share with your whole family. A healthy lifestyle also can lower your risk for serious health problems, such as high blood pressure, heart disease, and diabetes. You can follow a few steps listed below to improve your health and the health of your family. Follow-up care is a key part of your treatment and safety. Be sure to make and go to all appointments, and call your doctor if you are having problems. It's also a good idea to know your test results and keep a list of the medicines you take. How can you care for yourself at home? · Do not eat too much sugar, fat, or fast foods. You can still have dessert and treats now and then. The goal is moderation. · Start small to improve your eating habits. Pay attention to portion sizes, drink less juice and soda pop, and eat more fruits and vegetables. ? Eat a healthy amount of food. A 3-ounce serving of meat, for example, is about the size of a deck of cards. Fill the rest of your plate with vegetables and whole grains. ? Limit the amount of soda and sports drinks you have every day. Drink more water when you are thirsty. ? Eat at least 5 servings of fruits and vegetables every day. It may seem like a lot, but it is not hard to reach this goal. A serving or helping is 1 piece of fruit, 1 cup of vegetables, or 2 cups of leafy, raw vegetables. Have an apple or some carrot sticks as an afternoon snack instead of a candy bar. Try to have fruits and/or vegetables at every meal.  · Make exercise part of your daily routine. You may want to start with simple activities, such as walking, bicycling, or slow swimming. Try to be active 30 to 60 minutes every day. You do not need to do all 30 to 60 minutes all at once.  For example, you can exercise 3 times a day for 10 or 20 minutes. Moderate exercise is safe for most people, but it is always a good idea to talk to your doctor before starting an exercise program.  · Keep moving. Onlenie Ranjana the lawn, work in the garden, or [employer]. Take the stairs instead of the elevator at work. · If you smoke, quit. People who smoke have an increased risk for heart attack, stroke, cancer, and other lung illnesses. Quitting is hard, but there are ways to boost your chance of quitting tobacco for good. ? Use nicotine gum, patches, or lozenges. ? Ask your doctor about stop-smoking programs and medicines. ? Keep trying. In addition to reducing your risk of diseases in the future, you will notice some benefits soon after you stop using tobacco. If you have shortness of breath or asthma symptoms, they will likely get better within a few weeks after you quit. · Limit how much alcohol you drink. Moderate amounts of alcohol (up to 2 drinks a day for men, 1 drink a day for women) are okay. But drinking too much can lead to liver problems, high blood pressure, and other health problems. Family health  If you have a family, there are many things you can do together to improve your health. · Eat meals together as a family as often as possible. · Eat healthy foods. This includes fruits, vegetables, lean meats and dairy, and whole grains. · Include your family in your fitness plan. Most people think of activities such as jogging or tennis as the way to fitness, but there are many ways you and your family can be more active. Anything that makes you breathe hard and gets your heart pumping is exercise. Here are some tips:  ? Walk to do errands or to take your child to school or the bus.  ? Go for a family bike ride after dinner instead of watching TV. Where can you learn more? Go to http://matthew-efraín.info/. Enter B741 in the search box to learn more about \"A Healthy Lifestyle: Care Instructions. \"  Current as of: December 7, 2017  Content Version: 11.8  © 1257-6726 [employer]. Care instructions adapted under license by [employer] (which disclaims liability or warranty for this information). If you have questions about a medical condition or this instruction, always ask your healthcare professional. [address] any warranty or liability for your use of this information.

## 2018-12-27 NOTE — PROGRESS NOTES
Neurology Progress Note      HISTORY PROVIDED BY: patient    Chief Complaint:   Chief Complaint   Patient presents with    Multiple Sclerosis      Subjective:   Pt is a 28 y.o. RH female last seen in clinic on 6/27/18 in f/u for RRMS diagnosed after presenting with sudden onset of sensory changes in April, 2017,  tingling in bottom of feet that moved up her legs with walking, hands bilaterally in 4th and 5th digits, moving across to other fingers, tingling from bra line down to just above her pubic bone, around to sides and area felt tight. MRI of C and T spine with contrast on 4/15/17 with abnormal signal with enhancement and possible subtle mass effect in the cord at C4. MRI brain w/wo contrast 4/17/17 with multiple T2/Flair hyperintensities in the PV and subcortical WM, per radiology <9 lesions, with enhancing lesion in the anterior right temporal lobe. Sxs improved after course of IV Solumedrol. Started Copaxone 6/30/17, but had exacerbation 2/6/18 with multiple new enhancing lesions on MRI brain. S/p IV steroids. Had another exacerbation 3/12/18 with MRI brain 3/13/18 with significant progression of lesions wedith multiple enhancing lesions just since her last MRI brain one month prior and MRI C-spine with improvement in hyperintensity and cord expansion at C4. Had another exacerbation 4/6/18, treated with Acthar x 5 days. Switched to [employer] 4/24/18 after her insurance company denied treatment with Tysabri. Exam was unremarkable.    -Continued Aubagio  -Monthly CMP and CBC with diff x 5 more months. She returns for f/u. She called the clinic on 12/7/18 reporting tingling on soles of feet and feeling like heavy weights x 48 hours. MRI T-spine w/wo 12/12/18 was negative. She was given a medrol dose pack. Reports they are feeling better, no longer feel like heavy concrete blocks. No longer feels unsteady on feet. She notices it most with pressure like when walk, shoots up legs.   She had these sxs before in April 2017. Cannot think of any reason for old MS sxs to resurface. Not painful, not keeping her awake at night. Past Medical History:   Diagnosis Date    Multiple sclerosis ([address]     LP on 4/18/17 with normal OP 21mmHg, CSF studies were normal except +OGB and elevated IgG Index. Past Surgical History:   Procedure Laterality Date    HX ROTATOR CUFF REPAIR Right 1999      Social History     Socioeconomic History    Marital status:      Spouse name: Not on file    Number of children: Not on file    Years of education: Not on file    Highest education level: Not on file   Social Needs    Financial resource strain: Not on file    Food insecurity - worry: Not on file    Food insecurity - inability: Not on file    Transportation needs - medical: Not on file   Mettl needs - non-medical: Not on file   Occupational History    Occupation:  at [employer] Use    Smoking status: Never Smoker    Smokeless tobacco: Never Used    Tobacco comment: smokes socially   Substance and Sexual Activity    Alcohol use: Yes     Alcohol/week: 1.2 - 1.8 oz     Types: 2 - 3 Standard drinks or equivalent per week    Drug use: No    Sexual activity: Not on file   Other Topics Concern    Not on file   Social History Narrative    Lives in [place] with      Family History   Problem Relation Age of Onset    MS Mother         Dec 50yo    Other Father         Estranged    No Known Problems Sister     No Known Problems Brother     Other Brother         Hep C         Objective:   Review of Systems : Per HPI, o/w neg    No Known Allergies     Meds:  Outpatient Medications Prior to Visit   Medication Sig Dispense Refill    guaiFENesin ER (MUCINEX) 600 mg ER tablet Take 600 mg by mouth two (2) times a day.  teriflunomide (AUBAGIO) 14 mg tab Take 1 Tab by mouth daily.  30 Tab 11    cyanocobalamin (VITAMIN B-12) 1,000 mcg tablet Take 1,000 mcg by mouth daily.  DOCOSAHEXANOIC ACID/EPA (FISH OIL PO) Take  by mouth.  multivitamin (ONE A DAY) tablet Take 1 Tab by mouth daily.  sour cherry extract 1,000 mg cap Take 1 Cap by mouth daily.  cholecalciferol (VITAMIN D3) 1,000 unit cap Take 1,000 Units by mouth daily.  TURMERIC ROOT EXTRACT PO Take 1 Cap by mouth daily.  norgestimate-ethinyl estradiol (SPRINTEC, 28,) 0.25-35 mg-mcg tab Take 1 Tab by mouth daily.  predniSONE (DELTASONE) 10 mg tablet Take 6 tabs orally x 2 days, then 4 tabs daily x 2 days, then 2 tabs daily x 2 days, then 1 tab daily x 2 days, then 1/2 tab daily x 2 days 27 Tab 0     No facility-administered medications prior to visit. Imaging:  MRI Results (most recent):  Results from Hospital Encounter encounter on 12/12/18   MRI [address] SPINE W WO CONT    Narrative EXAM:  MRI [address] SPINE W WO CONT    INDICATION:   Pt with MS, now with tingling and heaviness in bilateral feet  concerning for cord lesion    COMPARISON: MRI thoracic spine 4/15/2017. TECHNIQUE: Multiplanar multisequence acquisition without and with contrast of  the thoracic spine. CONTRAST: 15 cc Dotarem. FINDINGS:  At T10-T11, there is intramedullary T2 hyperintensity noted within the central  cord. No abnormal enhancement is seen at this level. This lesion is new since  prior MRI. The remaining thoracic cord demonstrates normal size and signal. The  conus terminates at L1-L2. There is normal alignment of the thoracic spine. Vertebral body heights are  maintained without evidence of acute fracture. Marrow signal is normal. Small  disc protrusion at T10-T11 without significant spinal canal or neural foraminal  stenosis. Remaining disc spaces are preserved. No significant spinal canal or  neural foraminal stenosis at any level. Visualized soft tissues are  unremarkable. Impression IMPRESSION:    1.  Intramedullary T2 hyperintensity at the level of T10-T11, which is new since  2017, but without evidence of abnormal enhancement. Findings are favored to  represent a new chronic demyelinating plaque. No evidence of active  demyelination. 23X          CT Results (most recent):  Results from Hospital Encounter encounter on 04/09/17   CT HEAD WO CONT    Narrative EXAM:  CT HEAD WO CONT    INDICATION:   Head trauma, closed, mild, GCS >= 13, no risk factors, neuro exam  normal    COMPARISON: None. TECHNIQUE: Unenhanced CT of the head was performed using 5 mm images. Brain and  bone windows were generated. CT dose reduction was achieved through use of a  standardized protocol tailored for this examination and automatic exposure  control for dose modulation. FINDINGS:  There is no extra-axial fluid collection hemorrhage shift or masses her. Ventricles are normal in size and midline. Impression  impression: No acute changes. Reviewed records in [facility] today    Lab Review   Results for orders placed or performed in visit on 07/20/18   CBC WITH AUTOMATED DIFF   Result Value Ref Range    WBC 5.9 3.8 - 10.8 Thousand/uL    RBC 4.27 3.80 - 5.10 Million/uL    HGB 12.8 11.7 - 15.5 g/dL    HCT 37.9 35.0 - 45.0 %    MCV 88.8 80.0 - 100.0 fL    MCH 30.0 27.0 - 33.0 pg    MCHC 33.8 32.0 - 36.0 g/dL    RDW 11.7 11.0 - 15.0 %    PLATELET 805 661 - 153 Thousand/uL    MEAN PLATELET VOLUME 13.9 (H) 7.5 - 12.5 fL    ABS. NEUTROPHILS 3,634 1,500 - 7,800 cells/uL    ABSOLUTE BANDS CANCELED     ABSOLUTE METAMYELOCYTES CANCELED     ABSOLUTE MYELOCYTES CANCELED     ABSOLUTE PROMYELOCYTES CANCELED     ABS. LYMPHOCYTES 1,782 850 - 3,900 cells/uL    ABS. MONOCYTES 425 200 - 950 cells/uL    ABS. EOSINOPHILS 41 15 - 500 cells/uL    ABS.  BASOPHILS 18 0 - 200 cells/uL    ABSOLUTE BLASTS CANCELED     ABSOLUTE NRBC CANCELED     Neutrophils 61.6 %    BAND NEUTROPHILS CANCELED     METAMYELOCYTES CANCELED     MYELOCYTES CANCELED     PROMYELOCYTES CANCELED     LYMPHOCYTES 30.2 %    REACTIVE LYMPHS CANCELED     MONOCYTES 7.2 %    EOSINOPHILS 0.7 %    BASOPHILS 0.3 %    BLASTS CANCELED     NRBC CANCELED     COMMENT(S) CANCELED    METABOLIC PANEL, COMPREHENSIVE   Result Value Ref Range    Glucose 84 65 - 139 mg/dL    BUN 9 7 - 25 mg/dL    Creatinine 0.55 0.50 - 1.10 mg/dL    GFR est non- > OR = 60 mL/min/1.73m2    GFR est  > OR = 60 mL/min/1.73m2    BUN/Creatinine ratio NOT APPLICABLE 6 - 22 (calc)    Sodium 138 135 - 146 mmol/L    Potassium 4.1 3.5 - 5.3 mmol/L    Chloride 106 98 - 110 mmol/L    CO2 26 20 - 31 mmol/L    Calcium 9.0 8.6 - 10.2 mg/dL    Protein, total 6.0 (L) 6.1 - 8.1 g/dL    Albumin 3.8 3.6 - 5.1 g/dL    Globulin 2.2 1.9 - 3.7 g/dL (calc)    ALB/GLOBRATIO 1.7 1.0 - 2.5 (calc)    Bilirubin, total 0.4 0.2 - 1.2 mg/dL    Alk. phosphatase 27 (L) 33 - 115 U/L    AST (SGOT) 15 10 - 30 U/L    ALT (SGPT) 14 6 - 29 U/L        Exam:  Visit Vitals  /60   Pulse 69   Resp (!) 97   Ht 5' 3\" (1.6 m)   Wt 78.6 kg (173 lb 3.2 oz)   SpO2 (!) 16%   BMI 30.68 kg/m²     General:  Alert, cooperative, no distress. Head:  Normocephalic, without obvious abnormality, atraumatic. Respiratory:  Heart:   Non labored breathing  Regular rate and rhythm, no murmurs   Neck:      Extremities: Warm, no cyanosis or edema. Pulses: 2+ radial pulses. Neurologic:  MS: Alert and oriented x 4, speech intact. Language intact. Attention and fund of knowledge appropriate. Recent and remote memory intact.   Cranial Nerves:  II: visual fields VFF   II: pupils Equal, round, reactive to light   II: optic disc    III,VII: ptosis none   III,IV,VI: extraocular muscles  EOMI, no nystagmus or diplopia   V: facial light touch sensation     VII: facial muscle function   symmetric   VIII: hearing intact   IX: soft palate elevation  normal   XI: trapezius strength     XI: sternocleidomastoid strength    XII: tongue  Midline     Motor: normal bulk and tone, no tremor              Strength: 5/5 throughout, no PD  Sensory: Intact LT, PP, Vibratory sensation, position sense  Coordination: FTN, HTS, and MIL intact, romberg neg  Gait: normal gait, able to tandem walk  Reflexes: 2+ symmetric, except absent at knees, toes mute       Assessment/Plan   Pt is a 28 y.o. RH female with RRMS with c/o tingling on soles of feet, going up legs with walking, improved after oral prednisone taper. She was diagnosed after presenting with sudden onset of sensory changes in April, 2017,  tingling in bottom of feet that moved up her legs with walking, hands bilaterally in 4th and 5th digits, moving across to other fingers, tingling from bra line down to just above her pubic bone, around to sides and area felt tight. MRI of C and T spine with contrast on 4/15/17 with abnormal signal with enhancement and possible subtle mass effect in the cord at C4. MRI brain w/wo contrast 4/17/17 with multiple T2/Flair hyperintensities in the PV and subcortical WM, per radiology <9 lesions, with enhancing lesion in the anterior right temporal lobe. Sxs improved after course of IV Solumedrol. Started Copaxone 6/30/17, but had exacerbation 2/6/18 with multiple new enhancing lesions on MRI brain. S/p IV steroids. Had another exacerbation 3/12/18 with MRI brain 3/13/18 with significant progression of lesions wedith multiple enhancing lesions just since her last MRI brain one month prior and MRI C-spine with improvement in hyperintensity and cord expansion at C4. Had another exacerbation 4/6/18, treated with Acthar x 5 days. Switched to [employer] 4/24/18 after her insurance company denied treatment with Tysabri. Exam is unremarkable. This could be worsening of previous sxs given that she presented with similar complaints in April, 2017, or could have a second issue such as plantar fasciitis or PN. Given improvement, will watch and wait.  If sxs progress, pt instructed to call and we will reassess.   -Continue Aubagio  -Yearly MRI brain and C-spine w/wo in April, 2019 - will call in Feb to have these ordered  -F/u in clinic in late April, instructed to call in the interim if needed. ICD-10-CM ICD-9-CM    1. MS (multiple sclerosis) ([facility] G35 340    2. Paresthesia of both feet R20.2 782.0        Signed:   Scarlett Harvey MD  12/27/2018

## 2018-12-27 NOTE — PROGRESS NOTES
MsBradford Bijal Dominguez is here to follow up MS. She has had numbness bilateral feet for the past three weeks. Her symptoms last all day. Depression screening done on patient.

## 2019-01-10 LAB
ABSOLUTE BANDS, 67058: ABNORMAL
ABSOLUTE BLASTS: ABNORMAL
ABSOLUTE METAMYELOCYTES, 900360: ABNORMAL
ABSOLUTE MYELOCYTES: ABNORMAL
ABSOLUTE NRBC,ANRBC: ABNORMAL
ABSOLUTE PROMYELOCYTES: ABNORMAL
ALB/GLOBRATIO, 58C: 1.9 (CALC) (ref 1–2.5)
ALBUMIN SERPL-MCNC: 4.3 G/DL (ref 3.6–5.1)
ALP SERPL-CCNC: 37 U/L (ref 33–115)
ALT SERPL-CCNC: 26 U/L (ref 6–29)
AST SERPL W P-5'-P-CCNC: 16 U/L (ref 10–30)
BANDS,BANDS: ABNORMAL
BASOPHILS # BLD: 18 CELLS/UL (ref 0–200)
BASOPHILS NFR BLD: 0.2 %
BILIRUB SERPL-MCNC: 0.5 MG/DL (ref 0.2–1.2)
BLASTS,BLAST: ABNORMAL
BUN SERPL-MCNC: 12 MG/DL (ref 7–25)
BUN/CREATININE RATIO,BUCR: NORMAL (CALC) (ref 6–22)
CALCIUM SERPL-MCNC: 9.4 MG/DL (ref 8.6–10.2)
CHLORIDE SERPL-SCNC: 106 MMOL/L (ref 98–110)
CO2 SERPL-SCNC: 27 MMOL/L (ref 20–32)
COMMENT(S): ABNORMAL
CREAT SERPL-MCNC: 0.65 MG/DL (ref 0.5–1.1)
EOSINOPHIL # BLD: 225 CELLS/UL (ref 15–500)
EOSINOPHIL NFR BLD: 2.5 %
ERYTHROCYTE [DISTWIDTH] IN BLOOD BY AUTOMATED COUNT: 11.6 % (ref 11–15)
GLOBULIN,GLOB: 2.3 G/DL (CALC) (ref 1.9–3.7)
GLUCOSE SERPL-MCNC: 87 MG/DL (ref 65–139)
HCT VFR BLD AUTO: 38.6 % (ref 35–45)
HGB BLD-MCNC: 13.5 G/DL (ref 11.7–15.5)
LYMPHOCYTES # BLD: 2808 CELLS/UL (ref 850–3900)
LYMPHOCYTES NFR BLD: 31.2 %
MCH RBC QN AUTO: 31.3 PG (ref 27–33)
MCHC RBC AUTO-ENTMCNC: 35 G/DL (ref 32–36)
MCV RBC AUTO: 89.6 FL (ref 80–100)
METAMYELOCYTES,METAS: ABNORMAL
MONOCYTES # BLD: 864 CELLS/UL (ref 200–950)
MONOCYTES NFR BLD: 9.6 %
MYELOCYTES,MYELO: ABNORMAL
NEUTROPHILS # BLD AUTO: 5085 CELLS/UL (ref 1500–7800)
NEUTROPHILS # BLD: 56.5 %
NRBC: ABNORMAL
PLATELET # BLD AUTO: 237 THOUSAND/UL (ref 140–400)
PMV BLD AUTO: 12.6 FL (ref 7.5–12.5)
POTASSIUM SERPL-SCNC: 4.7 MMOL/L (ref 3.5–5.3)
PROMYELOCYTES,PRO: ABNORMAL
PROT SERPL-MCNC: 6.6 G/DL (ref 6.1–8.1)
RBC # BLD AUTO: 4.31 MILLION/UL (ref 3.8–5.1)
REACTIVE LYMPHS: ABNORMAL
SODIUM SERPL-SCNC: 139 MMOL/L (ref 135–146)
WBC # BLD AUTO: 9 THOUSAND/UL (ref 3.8–10.8)

## 2019-02-11 ENCOUNTER — TELEPHONE (OUTPATIENT)
Dept: NEUROLOGY | Age: 34
End: 2019-02-11

## 2019-02-11 DIAGNOSIS — G35 MS (MULTIPLE SCLEROSIS) (HCC): Primary | ICD-10-CM

## 2019-02-11 NOTE — TELEPHONE ENCOUNTER
Pt is at [facility] to get lab work done but they dont have an order. They are requesting an order be faxed as soon as possible.  Fax: [phone]

## 2019-03-05 ENCOUNTER — DOCUMENTATION ONLY (OUTPATIENT)
Dept: NEUROLOGY | Age: 34
End: 2019-03-05

## 2019-03-05 NOTE — PROGRESS NOTES
[facility] labs 2/14/19 received. CMP unremarkable  CBC with differential unremarkable    Leandro - Please call pt: Labs look good.

## 2019-03-06 NOTE — PROGRESS NOTES
I gave patient her lab results. Dr. Elizabet Avila, patient stated she needed new standing lab orders put in.  She also requested MRI orders

## 2019-03-07 ENCOUNTER — TELEPHONE (OUTPATIENT)
Dept: NEUROLOGY | Age: 34
End: 2019-03-07

## 2019-03-07 DIAGNOSIS — G35 MS (MULTIPLE SCLEROSIS) (HCC): Primary | ICD-10-CM

## 2019-03-07 NOTE — TELEPHONE ENCOUNTER
Leandro - MRI brain and C spine w/wo contrast are ordered. Pt does not need labs again until August, 2019, every 6 months.

## 2019-03-21 ENCOUNTER — HOSPITAL ENCOUNTER (OUTPATIENT)
Dept: MRI IMAGING | Age: 34
Discharge: HOME | End: 2019-03-21
Payer: COMMERCIAL

## 2019-03-21 ENCOUNTER — TELEPHONE (OUTPATIENT)
Dept: NEUROLOGY | Age: 34
End: 2019-03-21

## 2019-03-21 DIAGNOSIS — G35 MS (MULTIPLE SCLEROSIS) (HCC): ICD-10-CM

## 2019-03-21 PROCEDURE — 70553 MRI BRAIN STEM W/O & W/DYE: CPT

## 2019-03-21 PROCEDURE — 72156 MRI NECK SPINE W/O & W/DYE: CPT

## 2019-03-21 PROCEDURE — A9575 INJ GADOTERATE MEGLUMI 0.1ML: HCPCS | Performed by: RADIOLOGY

## 2019-03-21 RX ORDER — SODIUM CHLORIDE 0.9 % (FLUSH) 0.9 %
10 SYRINGE (ML) INJECTION
Status: COMPLETED | OUTPATIENT
Start: 2019-03-21 | End: 2019-03-21

## 2019-03-21 RX ADMIN — Medication 10 ML: at 11:00

## 2019-03-22 RX ORDER — METHYLPREDNISOLONE SODIUM SUCCINATE 1 G/16ML
1000 INJECTION, POWDER, LYOPHILIZED, FOR SOLUTION INTRAMUSCULAR; INTRAVENOUS DAILY
Qty: 5000 MG | Refills: 0 | Status: SHIPPED | OUTPATIENT
Start: 2019-03-22 | End: 2019-03-27

## 2019-03-22 RX ORDER — PREDNISONE 10 MG/1
TABLET ORAL
Qty: 27 TAB | Refills: 0 | Status: SHIPPED | OUTPATIENT
Start: 2019-03-22 | End: 2020-12-22 | Stop reason: ALTCHOICE

## 2019-03-22 NOTE — TELEPHONE ENCOUNTER
Discussed MRI findings, several new enhancing lesions on MRI brain. Recommend IV Solumedrol 1gm daily x 5 days, then prednisone taper. Pt has an appt on 4/1/19 - will discuss switching from Aubagio to [place] or [place]. Tahir Ramachandran - Please arrange this through the [facility], pt's preference.

## 2019-03-25 ENCOUNTER — HOSPITAL ENCOUNTER (OUTPATIENT)
Dept: INFUSION THERAPY | Age: 34
Discharge: HOME | End: 2019-03-25
Payer: COMMERCIAL

## 2019-03-25 VITALS
HEART RATE: 76 BPM | OXYGEN SATURATION: 97 % | SYSTOLIC BLOOD PRESSURE: 149 MMHG | RESPIRATION RATE: 16 BRPM | DIASTOLIC BLOOD PRESSURE: 80 MMHG | TEMPERATURE: 97.8 F

## 2019-03-25 PROCEDURE — 96365 THER/PROPH/DIAG IV INF INIT: CPT

## 2019-03-25 PROCEDURE — 74011250636 HC RX REV CODE- 250/636: Performed by: PSYCHIATRY & NEUROLOGY

## 2019-03-25 PROCEDURE — 74011000258 HC RX REV CODE- 258: Performed by: PSYCHIATRY & NEUROLOGY

## 2019-03-25 RX ORDER — SODIUM CHLORIDE 9 MG/ML
25 INJECTION, SOLUTION INTRAVENOUS CONTINUOUS
Status: DISPENSED | OUTPATIENT
Start: 2019-03-28 | End: 2019-03-28

## 2019-03-25 RX ORDER — SODIUM CHLORIDE 9 MG/ML
25 INJECTION, SOLUTION INTRAVENOUS CONTINUOUS
Status: DISCONTINUED | OUTPATIENT
Start: 2019-03-25 | End: 2019-03-26 | Stop reason: HOSPADM

## 2019-03-25 RX ORDER — SODIUM CHLORIDE 0.9 % (FLUSH) 0.9 %
5-10 SYRINGE (ML) INJECTION AS NEEDED
Status: DISCONTINUED | OUTPATIENT
Start: 2019-03-25 | End: 2019-03-26 | Stop reason: HOSPADM

## 2019-03-25 RX ORDER — SODIUM CHLORIDE 9 MG/ML
25 INJECTION, SOLUTION INTRAVENOUS CONTINUOUS
Status: DISPENSED | OUTPATIENT
Start: 2019-03-29 | End: 2019-03-29

## 2019-03-25 RX ORDER — SODIUM CHLORIDE 9 MG/ML
25 INJECTION, SOLUTION INTRAVENOUS CONTINUOUS
Status: DISPENSED | OUTPATIENT
Start: 2019-03-26 | End: 2019-03-26

## 2019-03-25 RX ORDER — SODIUM CHLORIDE 9 MG/ML
25 INJECTION, SOLUTION INTRAVENOUS CONTINUOUS
Status: DISPENSED | OUTPATIENT
Start: 2019-03-27 | End: 2019-03-27

## 2019-03-25 RX ADMIN — Medication 10 ML: at 13:08

## 2019-03-25 RX ADMIN — SODIUM CHLORIDE 1000 MG: 900 INJECTION, SOLUTION INTRAVENOUS at 13:08

## 2019-03-25 RX ADMIN — SODIUM CHLORIDE 25 ML/HR: 900 INJECTION, SOLUTION INTRAVENOUS at 13:08

## 2019-03-25 NOTE — PROGRESS NOTES
[address] at [address] [facility] Patient arrives for Solumedrol (1 of 5) without acute problems. Please see connect care for complete assessment and education provided. Vital signs stable throughout and prior to discharge, Pt. Tolerated treatment well and discharged without incident. PIV saline locked and wrapped with coban. Patient is aware of next [facility] appointment on 3/25/2019. Medications Verified by Buddy Lynn RN & Samuel Lovelace RN via [employer]: 
1. Solumedrol 1000mg over 1 hour VITAL SIGNS Patient Vitals for the past 12 hrs: 
 Temp Pulse Resp BP SpO2  
03/25/19 1418 97.8 °F (36.6 °C) 76 16 149/80   
03/25/19 1300 97.8 °F (36.6 °C) 76 16 147/86 97 %

## 2019-03-26 ENCOUNTER — HOSPITAL ENCOUNTER (OUTPATIENT)
Dept: INFUSION THERAPY | Age: 34
Discharge: HOME | End: 2019-03-26
Payer: COMMERCIAL

## 2019-03-26 VITALS
DIASTOLIC BLOOD PRESSURE: 77 MMHG | RESPIRATION RATE: 16 BRPM | SYSTOLIC BLOOD PRESSURE: 132 MMHG | TEMPERATURE: 97.9 F | HEART RATE: 84 BPM | OXYGEN SATURATION: 98 %

## 2019-03-26 PROCEDURE — 74011000258 HC RX REV CODE- 258: Performed by: PSYCHIATRY & NEUROLOGY

## 2019-03-26 PROCEDURE — 96365 THER/PROPH/DIAG IV INF INIT: CPT

## 2019-03-26 PROCEDURE — 74011250636 HC RX REV CODE- 250/636: Performed by: PSYCHIATRY & NEUROLOGY

## 2019-03-26 RX ADMIN — SODIUM CHLORIDE 1000 MG: 900 INJECTION, SOLUTION INTRAVENOUS at 13:11

## 2019-03-26 RX ADMIN — SODIUM CHLORIDE 25 ML/HR: 900 INJECTION, SOLUTION INTRAVENOUS at 13:10

## 2019-03-26 NOTE — PROGRESS NOTES
[address] at [address] 
 
9784 Patient arrives for Solumedrol (2 of 5) without acute problems. Please see connect care for complete assessment and education provided. Vital signs stable throughout and prior to discharge, Pt. Tolerated treatment well and discharged without incident. PIV saline locked and wrapped with coban. Patient is aware of next [facility] appointment on 3/27/2019. Medications Verified by Radha Clemons RN & Janice Riggs RN via [employer]: 
1. Solumedrol 1000mg over 1 hour VITAL SIGNS Patient Vitals for the past 12 hrs: 
 Temp Pulse Resp BP SpO2  
03/26/19 1416 97.9 °F (36.6 °C) 84 16 132/77   
03/26/19 1317 98.1 °F (36.7 °C) 80 16 127/77 98 %

## 2019-03-27 ENCOUNTER — HOSPITAL ENCOUNTER (OUTPATIENT)
Dept: INFUSION THERAPY | Age: 34
Discharge: HOME | End: 2019-03-27
Payer: COMMERCIAL

## 2019-03-27 VITALS
RESPIRATION RATE: 16 BRPM | SYSTOLIC BLOOD PRESSURE: 120 MMHG | DIASTOLIC BLOOD PRESSURE: 76 MMHG | HEART RATE: 59 BPM | TEMPERATURE: 97.7 F

## 2019-03-27 PROCEDURE — 74011000258 HC RX REV CODE- 258: Performed by: PSYCHIATRY & NEUROLOGY

## 2019-03-27 PROCEDURE — 74011250636 HC RX REV CODE- 250/636: Performed by: PSYCHIATRY & NEUROLOGY

## 2019-03-27 PROCEDURE — 96365 THER/PROPH/DIAG IV INF INIT: CPT

## 2019-03-27 RX ORDER — SODIUM CHLORIDE 0.9 % (FLUSH) 0.9 %
5-10 SYRINGE (ML) INJECTION AS NEEDED
Status: DISCONTINUED | OUTPATIENT
Start: 2019-03-27 | End: 2019-03-28 | Stop reason: HOSPADM

## 2019-03-27 RX ADMIN — SODIUM CHLORIDE 25 ML/HR: 900 INJECTION, SOLUTION INTRAVENOUS at 13:03

## 2019-03-27 RX ADMIN — SODIUM CHLORIDE 1000 MG: 900 INJECTION, SOLUTION INTRAVENOUS at 13:06

## 2019-03-27 NOTE — PROGRESS NOTES
PEDI [facility] BREMO VISIT NOTE 
   
1300 Patient arrives for SoluMedrol 3/5 without acute problems. Please see connect [facility] for complete assessment and education provided.  
  
24 gauge PIV placed on 3/25/2019; flushed and + blood return noted.  
   
Vitals Signs: 
Patient Vitals for the past 12 hrs: 
 Temp Pulse Resp BP  
03/27/19 1302 97.7 °F (36.5 °C) 80 16 131/88  
 
  
Medications: 
Verified by Abdon Childress RN via [employer] 1. SoluMedrol IV 
  
Patient's PIV was flushed; removed and bandage placed over site. 26 Vital signs stable throughout and prior to discharge, Patient tolerated treatment well and discharged without incident.  Patient/parent is aware of next [facility] appointment on 3/28/2019 at 11:00am.

## 2019-03-28 ENCOUNTER — HOSPITAL ENCOUNTER (OUTPATIENT)
Dept: INFUSION THERAPY | Age: 34
Discharge: HOME | End: 2019-03-28
Payer: COMMERCIAL

## 2019-03-28 VITALS
RESPIRATION RATE: 16 BRPM | DIASTOLIC BLOOD PRESSURE: 80 MMHG | TEMPERATURE: 97.6 F | SYSTOLIC BLOOD PRESSURE: 130 MMHG | HEART RATE: 69 BPM

## 2019-03-28 PROCEDURE — 96365 THER/PROPH/DIAG IV INF INIT: CPT

## 2019-03-28 PROCEDURE — 74011250636 HC RX REV CODE- 250/636: Performed by: PSYCHIATRY & NEUROLOGY

## 2019-03-28 PROCEDURE — 74011000258 HC RX REV CODE- 258: Performed by: PSYCHIATRY & NEUROLOGY

## 2019-03-28 RX ADMIN — SODIUM CHLORIDE 1000 MG: 900 INJECTION, SOLUTION INTRAVENOUS at 11:00

## 2019-03-28 NOTE — PROGRESS NOTES
PEDI [facility] BREMO VISIT NOTE 
   
1100 Patient arrives for SoluMedrol 3/5 without acute problems. Please see connect [facility] for complete assessment and education provided.  
  
24 gauge PIV placed to left hand; flushed and + blood return noted.  
   
Vitals Signs: 
Patient Vitals for the past 12 hrs: 
 Temp Pulse Resp BP  
03/28/19 1057 97.6 °F (36.4 °C) 71 16 128/80  
 
  
Medications: 
Verified by Abdon Childress RN via [employer] 1. SoluMedrol IV 
  
Patient's PIV was flushed; removed and bandage placed over site. 1200 Vital signs stable throughout and prior to discharge, Patient tolerated treatment well and discharged without incident.  Patient/parent is aware of next [facility] appointment on 3/29/2019 at 1:00pm.

## 2019-03-29 ENCOUNTER — HOSPITAL ENCOUNTER (OUTPATIENT)
Dept: INFUSION THERAPY | Age: 34
Discharge: HOME | End: 2019-03-29
Payer: COMMERCIAL

## 2019-03-29 VITALS
TEMPERATURE: 98 F | RESPIRATION RATE: 16 BRPM | DIASTOLIC BLOOD PRESSURE: 85 MMHG | SYSTOLIC BLOOD PRESSURE: 148 MMHG | HEART RATE: 53 BPM

## 2019-03-29 PROCEDURE — 74011250636 HC RX REV CODE- 250/636: Performed by: PSYCHIATRY & NEUROLOGY

## 2019-03-29 PROCEDURE — 74011000258 HC RX REV CODE- 258: Performed by: PSYCHIATRY & NEUROLOGY

## 2019-03-29 PROCEDURE — 96365 THER/PROPH/DIAG IV INF INIT: CPT

## 2019-03-29 RX ADMIN — SODIUM CHLORIDE 1000 MG: 900 INJECTION, SOLUTION INTRAVENOUS at 13:00

## 2019-03-29 NOTE — PROGRESS NOTES
PEDI [facility] VISIT NOTE 
   
1245 Patient arrives for SoluMedrol 3/5 without acute problems. Please see connect care for complete assessment and education provided.  
  
24 gauge PIV placed to left hand; flushed and + blood return noted.  
   
Vitals Signs: 
Patient Vitals for the past 12 hrs: 
 Temp Pulse Resp BP  
03/29/19 1301 97.9 °F (36.6 °C) (!) 55 16 146/81  
 
  
Medications: 
Verified by Abdon Light 232 RN via [employer] 1. SoluMedrol IV 
  
Patient's PIV was flushed; removed and bandage placed over site. 1200 Vital signs stable throughout and prior to discharge, Patient tolerated treatment well and discharged without incident.  Patient/parent is aware of next follow up appointment with MD.

## 2019-04-01 ENCOUNTER — OFFICE VISIT (OUTPATIENT)
Dept: NEUROLOGY | Age: 34
End: 2019-04-01

## 2019-04-01 VITALS
RESPIRATION RATE: 16 BRPM | DIASTOLIC BLOOD PRESSURE: 78 MMHG | HEART RATE: 68 BPM | WEIGHT: 174.3 LBS | OXYGEN SATURATION: 99 % | HEIGHT: 63 IN | SYSTOLIC BLOOD PRESSURE: 102 MMHG | BODY MASS INDEX: 30.88 KG/M2

## 2019-04-01 DIAGNOSIS — G35 MS (MULTIPLE SCLEROSIS) (HCC): Primary | ICD-10-CM

## 2019-04-01 NOTE — PROGRESS NOTES
Ms. Bharath Murillo presents today to follow up MS. She reports tingling in bilateral lower extremities. Depression screening done on patient.

## 2019-04-01 NOTE — PATIENT INSTRUCTIONS
A Healthy Lifestyle: Care Instructions Your Care Instructions A healthy lifestyle can help you feel good, stay at a healthy weight, and have plenty of energy for both work and play. A healthy lifestyle is something you can share with your whole family. A healthy lifestyle also can lower your risk for serious health problems, such as high blood pressure, heart disease, and diabetes. You can follow a few steps listed below to improve your health and the health of your family. Follow-up care is a key part of your treatment and safety. Be sure to make and go to all appointments, and call your doctor if you are having problems. It's also a good idea to know your test results and keep a list of the medicines you take. How can you care for yourself at home? · Do not eat too much sugar, fat, or fast foods. You can still have dessert and treats now and then. The goal is moderation. · Start small to improve your eating habits. Pay attention to portion sizes, drink less juice and soda pop, and eat more fruits and vegetables. ? Eat a healthy amount of food. A 3-ounce serving of meat, for example, is about the size of a deck of cards. Fill the rest of your plate with vegetables and whole grains. ? Limit the amount of soda and sports drinks you have every day. Drink more water when you are thirsty. ? Eat at least 5 servings of fruits and vegetables every day. It may seem like a lot, but it is not hard to reach this goal. A serving or helping is 1 piece of fruit, 1 cup of vegetables, or 2 cups of leafy, raw vegetables. Have an apple or some carrot sticks as an afternoon snack instead of a candy bar. Try to have fruits and/or vegetables at every meal. 
· Make exercise part of your daily routine. You may want to start with simple activities, such as walking, bicycling, or slow swimming. Try to be active 30 to 60 minutes every day.  You do not need to do all 30 to 60 minutes all at once. For example, you can exercise 3 times a day for 10 or 20 minutes. Moderate exercise is safe for most people, but it is always a good idea to talk to your doctor before starting an exercise program. 
· Keep moving. Bryanna Folk the lawn, work in the garden, or [place]. Take the stairs instead of the elevator at work. · If you smoke, quit. People who smoke have an increased risk for heart attack, stroke, cancer, and other lung illnesses. Quitting is hard, but there are ways to boost your chance of quitting tobacco for good. ? Use nicotine gum, patches, or lozenges. ? Ask your doctor about stop-smoking programs and medicines. ? Keep trying. In addition to reducing your risk of diseases in the future, you will notice some benefits soon after you stop using tobacco. If you have shortness of breath or asthma symptoms, they will likely get better within a few weeks after you quit. · Limit how much alcohol you drink. Moderate amounts of alcohol (up to 2 drinks a day for men, 1 drink a day for women) are okay. But drinking too much can lead to liver problems, high blood pressure, and other health problems. Family health If you have a family, there are many things you can do together to improve your health. · Eat meals together as a family as often as possible. · Eat healthy foods. This includes fruits, vegetables, lean meats and dairy, and whole grains. · Include your family in your fitness plan. Most people think of activities such as jogging or tennis as the way to fitness, but there are many ways you and your family can be more active. Anything that makes you breathe hard and gets your heart pumping is exercise. Here are some tips: 
? Walk to do errands or to take your child to school or the bus. 
? Go for a family bike ride after dinner instead of watching TV. Where can you learn more? Go to http://matthew-efraín.info/. Enter U289 in the search box to learn more about \"A Healthy Lifestyle: Care Instructions. \" Current as of: September 11, 2018 Content Version: 11.9 © 0089-0203 [employer]. Care instructions adapted under license by [employer] (which disclaims liability or warranty for this information). If you have questions about a medical condition or this instruction, always ask your healthcare professional. [place] [address] any warranty or liability for your use of this information. Check with PCP regarding any routine vaccines you need. These should be given at least 6 weeks prior to starting Ocrevus.

## 2019-04-01 NOTE — PROGRESS NOTES
Neurology Progress Note HISTORY PROVIDED BY: patient Chief Complaint:  
Chief Complaint Patient presents with  Multiple Sclerosis Subjective:  
Pt is a 27 y.o. RH female with RRMS last seen in clinic on 12/27/18 in f/u for MS. At last visit, c/o tingling on soles of feet, going up legs with walking, improved after oral prednisone taper. She was diagnosed after presenting with sudden onset of sensory changes in April, 2017,  tingling in bottom of feet that moved up her legs with walking, hands bilaterally in 4th and 5th digits, moving across to other fingers, tingling from bra line down to just above her pubic bone, around to sides and area felt tight. MRI of C and T spine with contrast on 4/15/17 with abnormal signal with enhancement and possible subtle mass effect in the cord at C4. MRI brain w/wo contrast 4/17/17 with multiple T2/Flair hyperintensities in the PV and subcortical WM, per radiology <9 lesions, with enhancing lesion in the anterior right temporal lobe. Sxs improved after course of IV Solumedrol. Started Copaxone 6/30/17, but had exacerbation 2/6/18 with multiple new enhancing lesions on MRI brain. S/p IV steroids. Had another exacerbation 3/12/18 with MRI brain 3/13/18 with significant progression of lesions with multiple enhancing lesions just since her last MRI brain one month prior and MRI C-spine with improvement in hyperintensity and cord expansion at C4. Had another exacerbation 4/6/18, treated with Acthar x 5 days. Switched to [facility] 4/24/18 after her insurance company denied treatment with Tysabri. Exam was unremarkable. This could be worsening of previous sxs given that she presented with similar complaints in April, 2017, or could have a second issue such as plantar fasciitis or PN. Given improvement, will watch and wait. If sxs progress, pt instructed to call and we will reassess.  
-Continued Aubagio 
-Yearly MRI brain and C-spine w/wo in April, 2019 She returns for f/u. Denies any acute deficits, does have intermittent fatigue. Does have some word finding, but attributes this to fatigue. She has been doing well at her job and getting good evaluations. Routine yearly MRI brain w/wo contrast 3/21/19 with several new enhancing lesions. MRI C-spine w/wo contrast was stable without new or enhancing lesions seen. Mild disc protrusion at C5-C6. [facility] labs 2/14/19 - CMP unremarkable, CBC with differential unremarkable. Pt was contacted by phone 3/21/19 with MRI results and started IV Solumedrol 1g daily x 5 days, currently on oral prednisone taper. Past Medical History:  
Diagnosis Date  Multiple sclerosis ([facility] [address] LP on 4/18/17 with normal OP 21mmHg, CSF studies were normal except +OGB and elevated IgG Index. Past Surgical History:  
Procedure Laterality Date Yogi Danielle Right 1999 Social History Socioeconomic History  Marital status:  Spouse name: Not on file  Number of children: Not on file  Years of education: Not on file  Highest education level: Not on file Occupational History  Occupation:  at [employer] Social Needs  Financial resource strain: Not on file  Food insecurity:  
  Worry: Not on file Inability: Not on file  Transportation needs:  
  Medical: Not on file Non-medical: Not on file Tobacco Use  Smoking status: Never Smoker  Smokeless tobacco: Never Used  Tobacco comment: smokes socially Substance and Sexual Activity  Alcohol use: Yes Alcohol/week: 1.2 - 1.8 oz Types: 2 - 3 Standard drinks or equivalent per week  Drug use: No  
 Sexual activity: Not on file Lifestyle  Physical activity:  
  Days per week: Not on file Minutes per session: Not on file  Stress: Not on file Relationships  Social connections:  
  Talks on phone: Not on file Gets together: Not on file Attends [employer]ism service: Not on file Active member of club or organization: Not on file Attends meetings of clubs or organizations: Not on file Relationship status: Not on file  Intimate partner violence:  
  Fear of current or ex partner: Not on file Emotionally abused: Not on file Physically abused: Not on file Forced sexual activity: Not on file Other Topics Concern  Not on file Social History Narrative Lives in [place] with  Family History Problem Relation Age of Onset  MS Mother Dec 48yo  Other Father Estranged  No Known Problems Sister  No Known Problems Brother  Other Brother Hep C Objective:  
Review of Systems : Per HPI, o/w neg No Known Allergies Meds: Outpatient Medications Prior to Visit Medication Sig Dispense Refill  predniSONE (DELTASONE) 10 mg tablet Take 6 tabs orally x 2 days, then 4 tabs daily x 2 days, then 2 tabs daily x 2 days, then 1 tab daily x 2 days, then 1/2 tab daily x 2 days 27 Tab 0  
 teriflunomide (AUBAGIO) 14 mg tab Take 1 Tab by mouth daily. 30 Tab 11  
 cyanocobalamin (VITAMIN B-12) 1,000 mcg tablet Take 1,000 mcg by mouth daily.  DOCOSAHEXANOIC ACID/EPA (FISH OIL PO) Take  by mouth.  multivitamin (ONE A DAY) tablet Take 1 Tab by mouth daily.  sour cherry extract 1,000 mg cap Take 1 Cap by mouth daily.  cholecalciferol (VITAMIN D3) 1,000 unit cap Take 1,000 Units by mouth daily.  TURMERIC ROOT EXTRACT PO Take 1 Cap by mouth daily.  norgestimate-ethinyl estradiol (SPRINTEC, 28,) 0.25-35 mg-mcg tab Take 1 Tab by mouth daily.  guaiFENesin ER (MUCINEX) 600 mg ER tablet Take 600 mg by mouth two (2) times a day. No facility-administered medications prior to visit. Imaging: MRI Results (most recent): 
Results from Hospital Encounter encounter on 03/21/19 MRI CERV SPINE W WO CONT Narrative EXAM: MRI CERV SPINE W WO CONT INDICATION: Pt with MS, assess for disease progression. COMPARISON: 3/26/2018 TECHNIQUE: MR imaging of the cervical spine was performed using the following 
sequences: sagittal T1, T2, STIR;  axial T2, T1 prior to and following contrast 
administration. CONTRAST: 15 mL of Dotarem gadolinium contrast. 
 
FINDINGS: 
 
There is normal alignment of the cervical spine. Vertebral body heights are 
maintained. Marrow signal is normal. 
 
The craniocervical junction is intact. Focal T2 hyperintense signal change is 
redemonstrated in the cord at the C3-C4 level, unchanged. No new lesions or 
associated enhancement. Posterior fossa signal changes redemonstrated. See separate report for brain 
MRI. C2-C3: No herniation or stenosis. C3-C4: Minimal right-sided uncovertebral joint hypertrophy. No neuroforaminal 
narrowing or spinal canal stenosis. C4-C5: Minimal right-sided uncovertebral joint hypertrophy. No neuroforaminal 
narrowing or spinal canal stenosis. C5-C6: Minimal central disc protrusion with associated annular fissure, which 
has developed in the interval. No significant neuroforaminal narrowing or spinal 
canal stenosis. C6-C7: No herniation or stenosis. C7-T1: No herniation or stenosis. Impression IMPRESSION: 
 
1. Chronic signal changes at the C3-C4 level. No new lesions. No enhancement to 
suggest active demyelination. 2. New minimal central disc protrusion at the C5-C6 level. No neuroforaminal 
narrowing or spinal canal stenosis. CT Results (most recent): 
Results from Hospital Encounter encounter on 04/09/17 CT HEAD WO CONT Narrative EXAM:  CT HEAD WO CONT INDICATION:   Head trauma, closed, mild, GCS >= 13, no risk factors, neuro exam 
normal 
 
COMPARISON: None. TECHNIQUE: Unenhanced CT of the head was performed using 5 mm images. Brain and 
bone windows were generated.   CT dose reduction was achieved through use of a 
standardized protocol tailored for this examination and automatic exposure 
control for dose modulation. FINDINGS: 
There is no extra-axial fluid collection hemorrhage shift or masses her. Ventricles are normal in size and midline. Impression  impression: No acute changes. Reviewed records in [facility] and RMI tab today Lab Review Results for orders placed or performed in visit on 09/06/18 METABOLIC PANEL, COMPREHENSIVE Result Value Ref Range Glucose 87 65 - 139 mg/dL BUN 12 7 - 25 mg/dL Creatinine 0.65 0.50 - 1.10 mg/dL GFR est non- > OR = 60 mL/min/1.73m2 GFR est  > OR = 60 mL/min/1.73m2 BUN/Creatinine ratio NOT APPLICABLE 6 - 22 (calc) Sodium 139 135 - 146 mmol/L Potassium 4.7 3.5 - 5.3 mmol/L Chloride 106 98 - 110 mmol/L  
 CO2 27 20 - 32 mmol/L Calcium 9.4 8.6 - 10.2 mg/dL Protein, total 6.6 6.1 - 8.1 g/dL Albumin 4.3 3.6 - 5.1 g/dL Globulin 2.3 1.9 - 3.7 g/dL (calc) ALB/GLOBRATIO 1.9 1.0 - 2.5 (calc) Bilirubin, total 0.5 0.2 - 1.2 mg/dL Alk. phosphatase 37 33 - 115 U/L  
 AST (SGOT) 16 10 - 30 U/L  
 ALT (SGPT) 26 6 - 29 U/L  
CBC WITH AUTOMATED DIFF Result Value Ref Range WBC 9.0 3.8 - 10.8 Thousand/uL RBC 4.31 3.80 - 5.10 Million/uL HGB 13.5 11.7 - 15.5 g/dL HCT 38.6 35.0 - 45.0 % MCV 89.6 80.0 - 100.0 fL  
 MCH 31.3 27.0 - 33.0 pg  
 MCHC 35.0 32.0 - 36.0 g/dL  
 RDW 11.6 11.0 - 15.0 % PLATELET 307 565 - 644 Thousand/uL MEAN PLATELET VOLUME 54.3 (H) 7.5 - 12.5 fL  
 ABS. NEUTROPHILS 5,085 1,500 - 7,800 cells/uL ABSOLUTE BANDS CANCELED ABSOLUTE METAMYELOCYTES CANCELED ABSOLUTE MYELOCYTES CANCELED ABSOLUTE PROMYELOCYTES CANCELED   
 ABS. LYMPHOCYTES 2,808 850 - 3,900 cells/uL  
 ABS. MONOCYTES 864 200 - 950 cells/uL  
 ABS. EOSINOPHILS 225 15 - 500 cells/uL  
 ABS. BASOPHILS 18 0 - 200 cells/uL ABSOLUTE BLASTS CANCELED ABSOLUTE NRBC CANCELED Neutrophils 56.5 % BAND NEUTROPHILS CANCELED METAMYELOCYTES CANCELED MYELOCYTES CANCELED   
 PROMYELOCYTES CANCELED   
 LYMPHOCYTES 31.2 % REACTIVE LYMPHS CANCELED MONOCYTES 9.6 % EOSINOPHILS 2.5 % BASOPHILS 0.2 % BLASTS CANCELED   
 NRBC CANCELED COMMENT(S) CANCELED Exam: 
Visit Vitals /78 Pulse 68 Resp 16 Ht 5' 3\" (1.6 m) Wt 79.1 kg (174 lb 4.8 oz) SpO2 99% BMI 30.88 kg/m² General:  Alert, cooperative, no distress. Head:  Normocephalic, without obvious abnormality, atraumatic. Respiratory: 
Heart:   Non labored breathing Regular rate and rhythm, no murmurs Neck:     
Extremities: Warm, no cyanosis or edema. Pulses: 2+ radial pulses. Neurologic:  MS: Alert and oriented x 4, speech intact. Language intact. Attention and fund of knowledge appropriate. Recent and remote memory intact. Cranial Nerves: 
II: visual fields VFF II: pupils Equal, round, reactive to light II: optic disc   
III,VII: ptosis none III,IV,VI: extraocular muscles  EOMI, no nystagmus or diplopia V: facial light touch sensation VII: facial muscle function   symmetric VIII: hearing intact IX: soft palate elevation  normal  
XI: trapezius strength XI: sternocleidomastoid strength XII: tongue  Midline Motor: normal bulk and tone, no tremor Strength: 5/5 throughout, no PD Sensory: Intact LT, PP Coordination: FTN, HTS, and MIL intact Gait: normal gait, able to tandem walk Reflexes: 2+ symmetric Assessment/Plan Pt is a 27 y.o. RH female with RRMS, diagnosed after presenting with sudden onset of sensory changes in April, 2017,  tingling in bottom of feet that moved up her legs with walking, hands bilaterally in 4th and 5th digits, moving across to other fingers, tingling from bra line down to just above her pubic bone, around to sides and area felt tight.  MRI of C and T spine with contrast on 4/15/17 with abnormal signal with enhancement and possible subtle mass effect in the cord at C4. MRI brain w/wo contrast 4/17/17 with multiple T2/Flair hyperintensities in the PV and subcortical WM, per radiology <9 lesions, with enhancing lesion in the anterior right temporal lobe. Started Copaxone 6/30/17, but had exacerbation 2/6/18 with multiple new enhancing lesions on MRI brain. Had another exacerbation 3/12/18 with MRI brain 3/13/18 with significant progression of lesions with multiple enhancing lesions just since her last MRI brain one month prior and MRI C-spine with improvement in hyperintensity and cord expansion at C4. Had another exacerbation 4/6/18, treated with Acthar x 5 days. Switched to XenoOne 4/24/18 after her insurance company denied treatment with Tysabri. Now with progression of disease, MRI brain w/wo contrast 3/21/19 with several new enhancing lesions. MRI C-spine w/wo contrast 3/21/19 is stable without new or enhancing lesions seen. Exam remains unremarkable and stable. Discussed disease progression. Recommend switch to Ocrevus or Lemtrada, pros and cons of each, side effects/potential complications discussed. She would like to start [address] [place] [address]. Will check Hep B, CMP, CBC with diff, TSH. Pt instructed to check with PCP regarding any vaccinations that are due, must have these done 6 weeks prior to starting infusion. Discussed vitamin D deficiency and worse clinical outcome with MS pts. Will check vit D level to make certain she is adequately replaced, goal >40. Will check on need for a washout period after Aubagio prior to starting Ocrevus. F/u in clinic in 3 months, instructed to call in the interim if needed. ICD-10-CM ICD-9-CM 1. MS (multiple sclerosis) (HCC) G35 340 VITAMIN D, 25 HYDROXY  
   HEPATITIS B EVALUATION  
   METABOLIC PANEL, COMPREHENSIVE  
   CBC WITH AUTOMATED DIFF  
   TSH 3RD GENERATION Signed: Mark Munguia MD 
4/1/2019

## 2019-04-01 NOTE — LETTER
4/1/19 Patient: Sylvia Horowitz YOB: 1985 Date of Visit: 4/1/2019 Tiff Smyth MD 
[address] [place] [address] VIA Facsimile: [phone] Dear Tiff Smyth MD, Thank you for referring Ms. Sylvia Horowitz to [address] [place] [address] for evaluation. My notes for this consultation are attached. If you have questions, please do not hesitate to call me. I look forward to following your patient along with you. Sincerely, Shannan Adhikari MD

## 2019-04-03 LAB
25(OH)D3+25(OH)D2 SERPL-MCNC: 65.7 NG/ML (ref 30–100)
ALBUMIN SERPL-MCNC: 4.1 G/DL (ref 3.5–5.5)
ALBUMIN/GLOB SERPL: 1.7 {RATIO} (ref 1.2–2.2)
ALP SERPL-CCNC: 18 IU/L (ref 39–117)
ALT SERPL-CCNC: 18 IU/L (ref 0–32)
AST SERPL-CCNC: 11 IU/L (ref 0–40)
BASOPHILS # BLD AUTO: 0 X10E3/UL (ref 0–0.2)
BASOPHILS NFR BLD AUTO: 0 %
BILIRUB SERPL-MCNC: 0.4 MG/DL (ref 0–1.2)
BUN SERPL-MCNC: 11 MG/DL (ref 6–20)
BUN/CREAT SERPL: 19 (ref 9–23)
CALCIUM SERPL-MCNC: 9.5 MG/DL (ref 8.7–10.2)
CHLORIDE SERPL-SCNC: 99 MMOL/L (ref 96–106)
CO2 SERPL-SCNC: 19 MMOL/L (ref 20–29)
CREAT SERPL-MCNC: 0.58 MG/DL (ref 0.57–1)
EOSINOPHIL # BLD AUTO: 0 X10E3/UL (ref 0–0.4)
EOSINOPHIL NFR BLD AUTO: 0 %
ERYTHROCYTE [DISTWIDTH] IN BLOOD BY AUTOMATED COUNT: 13.1 % (ref 12.3–15.4)
GLOBULIN SER CALC-MCNC: 2.4 G/DL (ref 1.5–4.5)
GLUCOSE SERPL-MCNC: 84 MG/DL (ref 65–99)
HBV CORE AB SERPL QL IA: NEGATIVE
HBV CORE IGM SERPL QL IA: NEGATIVE
HBV E AB SERPL QL IA: NEGATIVE
HBV E AG SERPL QL IA: NEGATIVE
HBV SURFACE AB SER QL: REACTIVE
HBV SURFACE AG SERPL QL IA: NEGATIVE
HCT VFR BLD AUTO: 40.8 % (ref 34–46.6)
HGB BLD-MCNC: 13.9 G/DL (ref 11.1–15.9)
IMM GRANULOCYTES # BLD AUTO: 0.1 X10E3/UL (ref 0–0.1)
IMM GRANULOCYTES NFR BLD AUTO: 1 %
LYMPHOCYTES # BLD AUTO: 1.7 X10E3/UL (ref 0.7–3.1)
LYMPHOCYTES NFR BLD AUTO: 15 %
MCH RBC QN AUTO: 30.5 PG (ref 26.6–33)
MCHC RBC AUTO-ENTMCNC: 34.1 G/DL (ref 31.5–35.7)
MCV RBC AUTO: 90 FL (ref 79–97)
MONOCYTES # BLD AUTO: 0.8 X10E3/UL (ref 0.1–0.9)
MONOCYTES NFR BLD AUTO: 7 %
NEUTROPHILS # BLD AUTO: 8.7 X10E3/UL (ref 1.4–7)
NEUTROPHILS NFR BLD AUTO: 77 %
PLATELET # BLD AUTO: 219 X10E3/UL (ref 150–379)
POTASSIUM SERPL-SCNC: 4.3 MMOL/L (ref 3.5–5.2)
PROT SERPL-MCNC: 6.5 G/DL (ref 6–8.5)
RBC # BLD AUTO: 4.56 X10E6/UL (ref 3.77–5.28)
SODIUM SERPL-SCNC: 137 MMOL/L (ref 134–144)
TSH SERPL DL<=0.005 MIU/L-ACNC: 0.99 UIU/ML (ref 0.45–4.5)
WBC # BLD AUTO: 11.3 X10E3/UL (ref 3.4–10.8)

## 2019-04-03 NOTE — PROGRESS NOTES
Spoke with patient and informed her of her lab results, per Dr. Jose G Grossman. Patient was given an opportunity to ask questions, repeated information, and verbalized understanding.

## 2019-04-04 ENCOUNTER — TELEPHONE (OUTPATIENT)
Dept: NEUROLOGY | Age: 34
End: 2019-04-04

## 2019-04-04 NOTE — TELEPHONE ENCOUNTER
Paperwork is ready to be sent as soon as patient comes in to sign. Patient stated she would be in today or tomorrow to sign. I verbalized inderstanding.

## 2019-04-04 NOTE — TELEPHONE ENCOUNTER
Called and spoke with pt regarding wash out of Aubagio prior to [facility] or after if needed. Discussed with medical liaison from [facility], no data, but looking at mechanism of action with each, would not expect an issue. Could lower lymphocyte counts. If needed could wash out if a problem arises. Talked to Ocrevus rep, no data available, but is being studied. Pt would like to proceed with Ocrevus and wash out aubagio if needed. She will stop Aubagio tablets starting tomorrow. Gilberto Leblanc - please proceed with Ocrevus paperwork for infusion.

## 2019-04-15 DIAGNOSIS — G35 MS (MULTIPLE SCLEROSIS) (HCC): ICD-10-CM

## 2019-04-16 RX ORDER — DIPHENHYDRAMINE HCL 25 MG
50 CAPSULE ORAL ONCE
Status: COMPLETED | OUTPATIENT
Start: 2019-04-19 | End: 2019-04-19

## 2019-04-16 RX ORDER — DIPHENHYDRAMINE HYDROCHLORIDE 50 MG/ML
50 INJECTION, SOLUTION INTRAMUSCULAR; INTRAVENOUS
Status: DISCONTINUED | OUTPATIENT
Start: 2019-04-19 | End: 2019-04-20 | Stop reason: HOSPADM

## 2019-04-16 RX ORDER — ACETAMINOPHEN 325 MG/1
650 TABLET ORAL ONCE
Status: COMPLETED | OUTPATIENT
Start: 2019-04-19 | End: 2019-04-19

## 2019-04-16 RX ORDER — SODIUM CHLORIDE 9 MG/ML
25 INJECTION, SOLUTION INTRAVENOUS CONTINUOUS
Status: DISCONTINUED | OUTPATIENT
Start: 2019-04-19 | End: 2019-04-20 | Stop reason: HOSPADM

## 2019-04-16 RX ORDER — ACETAMINOPHEN 325 MG/1
650 TABLET ORAL
Status: DISCONTINUED | OUTPATIENT
Start: 2019-04-19 | End: 2019-04-20 | Stop reason: HOSPADM

## 2019-04-17 ENCOUNTER — TELEPHONE (OUTPATIENT)
Dept: NEUROLOGY | Age: 34
End: 2019-04-17

## 2019-04-18 NOTE — TELEPHONE ENCOUNTER
PA they are calling about is for the patient's infusion ([facility]). If patient is receiving this medication from the infusion center, then I do not handle this PA. Brittany Millan would be handling this PA. Lilo at [facility] would need to contact [address] [place] [address].

## 2019-04-18 NOTE — TELEPHONE ENCOUNTER
I left a message for Clare Osorio, with Ocrevus, to contact the [facility] regarding the PA for Ocrevus infusion.

## 2019-04-19 ENCOUNTER — HOSPITAL ENCOUNTER (OUTPATIENT)
Dept: INFUSION THERAPY | Age: 34
Discharge: HOME | End: 2019-04-19
Payer: COMMERCIAL

## 2019-04-19 VITALS
RESPIRATION RATE: 16 BRPM | DIASTOLIC BLOOD PRESSURE: 87 MMHG | SYSTOLIC BLOOD PRESSURE: 122 MMHG | TEMPERATURE: 98.9 F | HEART RATE: 65 BPM

## 2019-04-19 PROCEDURE — 96375 TX/PRO/DX INJ NEW DRUG ADDON: CPT

## 2019-04-19 PROCEDURE — 74011250636 HC RX REV CODE- 250/636: Performed by: PSYCHIATRY & NEUROLOGY

## 2019-04-19 PROCEDURE — 96365 THER/PROPH/DIAG IV INF INIT: CPT

## 2019-04-19 PROCEDURE — 96415 CHEMO IV INFUSION ADDL HR: CPT

## 2019-04-19 PROCEDURE — 96413 CHEMO IV INFUSION 1 HR: CPT

## 2019-04-19 PROCEDURE — 96366 THER/PROPH/DIAG IV INF ADDON: CPT

## 2019-04-19 PROCEDURE — 74011250637 HC RX REV CODE- 250/637: Performed by: PSYCHIATRY & NEUROLOGY

## 2019-04-19 RX ADMIN — DIPHENHYDRAMINE HYDROCHLORIDE 50 MG: 50 INJECTION INTRAMUSCULAR; INTRAVENOUS at 16:15

## 2019-04-19 RX ADMIN — ACETAMINOPHEN 650 MG: 325 TABLET ORAL at 13:23

## 2019-04-19 RX ADMIN — SODIUM CHLORIDE 25 ML/HR: 900 INJECTION, SOLUTION INTRAVENOUS at 13:22

## 2019-04-19 RX ADMIN — OCRELIZUMAB 300 MG: 300 INJECTION INTRAVENOUS at 14:03

## 2019-04-19 RX ADMIN — DIPHENHYDRAMINE HYDROCHLORIDE 50 MG: 25 CAPSULE ORAL at 13:23

## 2019-04-19 RX ADMIN — METHYLPREDNISOLONE SODIUM SUCCINATE 125 MG: 125 INJECTION, POWDER, FOR SOLUTION INTRAMUSCULAR; INTRAVENOUS at 13:24

## 2019-04-19 NOTE — PROGRESS NOTES
Outpatient Infusion Center Progress Note 1300 Pt admit to [facility] for Ocrevus wk 0 ambulatory in stable condition. Assessment completed. No new concerns voiced. PIV established to L arm with positive blood return. NS started at [facility]. 1st dose education reviewed; questions and concerns addressed. Patient Vitals for the past 12 hrs: 
 Temp Pulse Resp BP  
04/19/19 1750 98.9 °F (37.2 °C) 65 16 122/87  
04/19/19 1720 97.7 °F (36.5 °C) 65 16 121/81  
04/19/19 1605 98.1 °F (36.7 °C) 71 16 133/79  
04/19/19 1537 98.6 °F (37 °C) 65 16 122/73  
04/19/19 1505 97.7 °F (36.5 °C) 67 16 119/76  
04/19/19 1432 97.6 °F (36.4 °C) 70 16 125/82  
04/19/19 1304 97.4 °F (36.3 °C) 75 18 142/86  
 
1603-Pt with itchy face and throat. Infusion stopped, NS infusing. Pt states feeling better after stopping Ocrevus. IV benadryl given. 1630-Pt with no more itchiness; Ocrevus restarted at 120 ml.   
1645-Pt tolerating infusion well; titrated as ordered. Medications: 
NS Benadryl 50mg PO Tylenol Solu-medrol Ocrevus (titrated per protocol) Benadryl 50mg IV 
 
1655 Pt tolerated treatment well. Pt observed 40 mins post infusion. S/S of delayed reaction reviewed; pt verbalized understanding. D/c home ambulatory in no distress. Pt aware of next appointment scheduled for 5/03/19.

## 2019-04-30 RX ORDER — SODIUM CHLORIDE 9 MG/ML
25 INJECTION, SOLUTION INTRAVENOUS CONTINUOUS
Status: DISCONTINUED | OUTPATIENT
Start: 2019-05-03 | End: 2019-05-04 | Stop reason: HOSPADM

## 2019-04-30 RX ORDER — ACETAMINOPHEN 325 MG/1
650 TABLET ORAL ONCE
Status: COMPLETED | OUTPATIENT
Start: 2019-05-03 | End: 2019-05-03

## 2019-04-30 RX ORDER — DIPHENHYDRAMINE HYDROCHLORIDE 50 MG/ML
50 INJECTION, SOLUTION INTRAMUSCULAR; INTRAVENOUS
Status: DISCONTINUED | OUTPATIENT
Start: 2019-05-03 | End: 2019-05-04 | Stop reason: HOSPADM

## 2019-04-30 RX ORDER — ACETAMINOPHEN 325 MG/1
650 TABLET ORAL
Status: DISCONTINUED | OUTPATIENT
Start: 2019-05-03 | End: 2019-05-04 | Stop reason: HOSPADM

## 2019-04-30 RX ORDER — DIPHENHYDRAMINE HCL 25 MG
50 CAPSULE ORAL ONCE
Status: COMPLETED | OUTPATIENT
Start: 2019-05-03 | End: 2019-05-03

## 2019-05-03 ENCOUNTER — HOSPITAL ENCOUNTER (OUTPATIENT)
Dept: INFUSION THERAPY | Age: 34
Discharge: HOME | End: 2019-05-03
Payer: COMMERCIAL

## 2019-05-03 VITALS
SYSTOLIC BLOOD PRESSURE: 123 MMHG | HEART RATE: 78 BPM | DIASTOLIC BLOOD PRESSURE: 73 MMHG | RESPIRATION RATE: 16 BRPM | TEMPERATURE: 97.3 F

## 2019-05-03 PROCEDURE — 74011250636 HC RX REV CODE- 250/636: Performed by: PSYCHIATRY & NEUROLOGY

## 2019-05-03 PROCEDURE — 96366 THER/PROPH/DIAG IV INF ADDON: CPT

## 2019-05-03 PROCEDURE — 96365 THER/PROPH/DIAG IV INF INIT: CPT

## 2019-05-03 PROCEDURE — 96415 CHEMO IV INFUSION ADDL HR: CPT

## 2019-05-03 PROCEDURE — 74011250637 HC RX REV CODE- 250/637: Performed by: PSYCHIATRY & NEUROLOGY

## 2019-05-03 PROCEDURE — 96413 CHEMO IV INFUSION 1 HR: CPT

## 2019-05-03 PROCEDURE — 96375 TX/PRO/DX INJ NEW DRUG ADDON: CPT

## 2019-05-03 RX ADMIN — SODIUM CHLORIDE 25 ML/HR: 900 INJECTION, SOLUTION INTRAVENOUS at 11:37

## 2019-05-03 RX ADMIN — DIPHENHYDRAMINE HYDROCHLORIDE 50 MG: 25 CAPSULE ORAL at 11:38

## 2019-05-03 RX ADMIN — METHYLPREDNISOLONE SODIUM SUCCINATE 125 MG: 125 INJECTION, POWDER, FOR SOLUTION INTRAMUSCULAR; INTRAVENOUS at 11:41

## 2019-05-03 RX ADMIN — OCRELIZUMAB 300 MG: 300 INJECTION INTRAVENOUS at 12:24

## 2019-05-03 RX ADMIN — ACETAMINOPHEN 650 MG: 325 TABLET ORAL at 11:38

## 2019-05-03 NOTE — PROGRESS NOTES
Outpatient Infusion Center Progress Note 1105 Pt admit to [facility] for [address] 2. Pt ambulatory in stable condition. Assessment completed. No new concerns voiced. Peripheral IV established with positive blood return. Line connected to NS at [facility]. Patient Vitals for the past 12 hrs: 
 Temp Pulse Resp BP  
05/03/19 1420  78 16 123/73  
05/03/19 1405  70 16 124/72  
05/03/19 1334 97.3 °F (36.3 °C) 67 16 123/73  
05/03/19 1300  68  135/73  
05/03/19 1106 98.8 °F (37.1 °C) 67 16 135/77 Medications: 
NS at [facility] Tylenol 650 MG PO Benadryl 50 MG PO 
Solu-medrol 125 MG IVP Ocrevus (titrated per order) 1420 Pt tolerated treatment well. PIV maintained positive blood return throughout treatment. Line flushed and removed. D/c home ambulatory in no distress. Pt aware of next appointment scheduled for 11/01/19 at 1100.

## 2019-05-04 NOTE — PROGRESS NOTES
Charted 5/4/19: 
 
Spiritual Care Partner Volunteer visited patient in [facility] on 5/3/19. Documented by: Chaplain Guzman MDiv, MACE 
[phone]

## 2019-05-07 ENCOUNTER — DOCUMENTATION ONLY (OUTPATIENT)
Dept: NEUROLOGY | Age: 34
End: 2019-05-07

## 2019-05-07 NOTE — PROGRESS NOTES
Received \"Medical policy denial\" form from [facility] requested updated/corrected diagnosis cods for monitoring labs done due to diagnosis of MS and use of high risk medications.   Form completed with Dx code Z62.922

## 2019-07-01 ENCOUNTER — OFFICE VISIT (OUTPATIENT)
Dept: NEUROLOGY | Age: 34
End: 2019-07-01

## 2019-07-01 VITALS
HEART RATE: 75 BPM | DIASTOLIC BLOOD PRESSURE: 85 MMHG | HEIGHT: 63 IN | OXYGEN SATURATION: 98 % | BODY MASS INDEX: 31.36 KG/M2 | SYSTOLIC BLOOD PRESSURE: 130 MMHG | RESPIRATION RATE: 16 BRPM | WEIGHT: 177 LBS

## 2019-07-01 DIAGNOSIS — G35 MS (MULTIPLE SCLEROSIS) (HCC): Primary | ICD-10-CM

## 2019-07-01 DIAGNOSIS — R19.5 LOOSE STOOLS: ICD-10-CM

## 2019-07-01 DIAGNOSIS — R15.2 FECAL URGENCY: ICD-10-CM

## 2019-07-01 NOTE — LETTER
7/29/19 Patient: Keisha Martin YOB: 1985 Date of Visit: 7/1/2019 cSott Brown MD 
[address] [facility] [address] VIA Facsimile: [phone] Dear Scott Brown MD, Thank you for referring Ms. Keisha Martin to [address] [place] [address] for evaluation. My notes for this consultation are attached. If you have questions, please do not hesitate to call me. I look forward to following your patient along with you. Sincerely, Isabelle Bacon MD

## 2019-07-01 NOTE — PROGRESS NOTES
Neurology Progress Note      HISTORY PROVIDED BY: patient    Chief Complaint:   Chief Complaint   Patient presents with    Multiple Sclerosis      Subjective:   Pt is a 27 y.o. RH female last seen in clinic on 4/1/19 in f/u for RRMS, diagnosed after presenting with sudden onset of sensory changes in April, 2017,  tingling in bottom of feet that moved up her legs with walking, hands bilaterally in 4th and 5th digits, moving across to other fingers, tingling from bra line down to just above her pubic bone, around to sides and area felt tight. MRI of C and T spine with contrast on 4/15/17 with abnormal signal with enhancement and possible subtle mass effect in the cord at C4. MRI brain w/wo contrast 4/17/17 with multiple T2/Flair hyperintensities in the PV and subcortical WM, per radiology <9 lesions, with enhancing lesion in the anterior right temporal lobe. Started Copaxone 6/30/17, but had exacerbation 2/6/18 with multiple new enhancing lesions on MRI brain. Had another exacerbation 3/12/18 with MRI brain 3/13/18 with significant progression of lesions with multiple enhancing lesions since MRI brain one month prior and MRI C-spine with improvement in hyperintensity and cord expansion at C4. Had another exacerbation 4/6/18, treated with Acthar x 5 days. Switched to [facility] 4/24/18 after her insurance company denied treatment with Tysabri. Had progression of disease on MRI brain w/wo contrast 3/21/19 with several new enhancing lesions. MRI C-spine w/wo contrast 3/21/19 was stable without new or enhancing lesions seen. Exam was unremarkable and stable. Discussed disease progression. Recommended switch to Ocrevus or Lemtrada, pros and cons of each, side effects/potential complications discussed. She wanted to start [address] [place] [address]. Checked Hep B, CMP, CBC with diff, TSH. Pt instructed to check with PCP regarding any vaccinations that are due, must have these done 6 weeks prior to starting infusion.   Discussed vitamin D deficiency and worse clinical outcome with MS pts. Checked vit D level to make certain she is adequately replaced, goal >40. Discussed possible need for a washout period after Aubagio prior to starting [address] [place] [address]    She returns for f/u. No new neurological complaints. She had her Ocrevus infusions 4/19/19 and 5/3/19. She had nausea immediately after the infusion. She has had loose stools and diarrhea. No bladder changes. She has been trying to change diet to help with stool consistency. She has taken Imodium which will keep her from going to the bathroom, but only works for a day. Has not lost wt. Eating normally. No more nausea. No abdominal pain. Not daily, but 5/7 days a week. Previous imaging/DMTs:  MRI of C and T spine with contrast on 4/15/17 with abnormal signal with enhancement and possible subtle mass effect in the cord at C4. MRI brain w/wo contrast 4/17/17 with multiple T2/Flair hyperintensities in the PV and subcortical WM, per radiology <9 lesions, with enhancing lesion in the anterior right temporal lobe. Started Copaxone 6/30/17,   Had exacerbation 2/6/18, MRI brain 2/6/18 with multiple new enhancing lesions   Had another exacerbation 3/12/18 with MRI brain 3/13/18 with significant progression of lesions with multiple enhancing lesions since MRI brain one month prior   MRI C-spine 3/26/18 with improvement in hyperintensity and cord expansion at C4. Had another exacerbation 4/6/18, treated with Acthar x 5 days. Switched to [employer] 4/24/18 after her insurance company denied treatment with Tysabri. Had progression of disease on MRI brain w/wo contrast 3/21/19 with several new enhancing lesions. MRI C-spine w/wo contrast 3/21/19 was stable without new or enhancing lesions seen.    Started [address] [place] [address] 4/19/19    Past Medical History:   Diagnosis Date    Encounter for long-term (current) use of high-risk medication     Multiple sclerosis ([facility]     LP on 4/18/17 with normal OP 21mmHg, CSF studies were normal except +OGB and elevated IgG Index. Past Surgical History:   Procedure Laterality Date    HX ROTATOR CUFF REPAIR Right 1999      Social History     Socioeconomic History    Marital status:      Spouse name: Not on file    Number of children: Not on file    Years of education: Not on file    Highest education level: Not on file   Occupational History    Occupation:  at [employer] [address] Financial resource strain: Not on file    Food insecurity:     Worry: Not on file     Inability: Not on file   [employer] needs:     Medical: Not on file     Non-medical: Not on file   Tobacco Use    Smoking status: Never Smoker    Smokeless tobacco: Never Used    Tobacco comment: smokes socially   Substance and Sexual Activity    Alcohol use:  Yes     Alcohol/week: 1.2 - 1.8 oz     Types: 2 - 3 Standard drinks or equivalent per week    Drug use: No    Sexual activity: Not on file   Lifestyle    Physical activity:     Days per week: Not on file     Minutes per session: Not on file    Stress: Not on file   Relationships    Social connections:     Talks on phone: Not on file     Gets together: Not on file     Attends Mandaeism service: Not on file     Active member of club or organization: Not on file     Attends meetings of clubs or organizations: Not on file     Relationship status: Not on file    Intimate partner violence:     Fear of current or ex partner: Not on file     Emotionally abused: Not on file     Physically abused: Not on file     Forced sexual activity: Not on file   Other Topics Concern    Not on file   Social History Narrative    Lives in [place] with      Family History   Problem Relation Age of Onset    MS Mother         Dec 48yo    Other Father         Estranged    No Known Problems Sister     No Known Problems Brother     Other Brother         Hep C         Objective:   Review of Systems : Per HPI, o/w neg    No Known Allergies     Meds:  Outpatient Medications Prior to Visit   Medication Sig Dispense Refill    ocrelizumab (OCREVUS) 30 mg/mL soln injection 1 mL by IntraVENous route every 6 months. 1 mL 2    cyanocobalamin (VITAMIN B-12) 1,000 mcg tablet Take 1,000 mcg by mouth daily.  DOCOSAHEXANOIC ACID/EPA (FISH OIL PO) Take  by mouth.  multivitamin (ONE A DAY) tablet Take 1 Tab by mouth daily.  sour cherry extract 1,000 mg cap Take 1 Cap by mouth daily.  cholecalciferol (VITAMIN D3) 1,000 unit cap Take 1,000 Units by mouth daily.  TURMERIC ROOT EXTRACT PO Take 1 Cap by mouth daily.  norgestimate-ethinyl estradiol (SPRINTEC, 28,) 0.25-35 mg-mcg tab Take 1 Tab by mouth daily.  predniSONE (DELTASONE) 10 mg tablet Take 6 tabs orally x 2 days, then 4 tabs daily x 2 days, then 2 tabs daily x 2 days, then 1 tab daily x 2 days, then 1/2 tab daily x 2 days 27 Tab 0    guaiFENesin ER (MUCINEX) 600 mg ER tablet Take 600 mg by mouth two (2) times a day. No facility-administered medications prior to visit. Imaging:  MRI Results (most recent):  Results from Hospital Encounter encounter on 03/21/19   MRI CERV SPINE W WO CONT    Narrative EXAM: MRI CERV SPINE W WO CONT    INDICATION: Pt with MS, assess for disease progression. COMPARISON: 3/26/2018    TECHNIQUE: MR imaging of the cervical spine was performed using the following  sequences: sagittal T1, T2, STIR;  axial T2, T1 prior to and following contrast  administration. CONTRAST: 15 mL of Dotarem gadolinium contrast.    FINDINGS:    There is normal alignment of the cervical spine. Vertebral body heights are  maintained. Marrow signal is normal.    The craniocervical junction is intact. Focal T2 hyperintense signal change is  redemonstrated in the cord at the C3-C4 level, unchanged. No new lesions or  associated enhancement. Posterior fossa signal changes redemonstrated.  See separate report for brain  MRI. C2-C3: No herniation or stenosis. C3-C4: Minimal right-sided uncovertebral joint hypertrophy. No neuroforaminal  narrowing or spinal canal stenosis. C4-C5: Minimal right-sided uncovertebral joint hypertrophy. No neuroforaminal  narrowing or spinal canal stenosis. C5-C6: Minimal central disc protrusion with associated annular fissure, which  has developed in the interval. No significant neuroforaminal narrowing or spinal  canal stenosis. C6-C7: No herniation or stenosis. C7-T1: No herniation or stenosis. Impression IMPRESSION:    1. Chronic signal changes at the C3-C4 level. No new lesions. No enhancement to  suggest active demyelination. 2. New minimal central disc protrusion at the C5-C6 level. No neuroforaminal  narrowing or spinal canal stenosis. CT Results (most recent):  Results from Hospital Encounter encounter on 04/09/17   CT HEAD WO CONT    Narrative EXAM:  CT HEAD WO CONT    INDICATION:   Head trauma, closed, mild, GCS >= 13, no risk factors, neuro exam  normal    COMPARISON: None. TECHNIQUE: Unenhanced CT of the head was performed using 5 mm images. Brain and  bone windows were generated. CT dose reduction was achieved through use of a  standardized protocol tailored for this examination and automatic exposure  control for dose modulation. FINDINGS:  There is no extra-axial fluid collection hemorrhage shift or masses her. Ventricles are normal in size and midline. Impression  impression: No acute changes.             Reviewed records in [employer] and [facility] tab today    Lab Review   Results for orders placed or performed in visit on 04/01/19   VITAMIN D, 25 HYDROXY   Result Value Ref Range    VITAMIN D, 25-HYDROXY 65.7 30.0 - 760.3 ng/mL   METABOLIC PANEL, COMPREHENSIVE   Result Value Ref Range    Glucose 84 65 - 99 mg/dL    BUN 11 6 - 20 mg/dL    Creatinine 0.58 0.57 - 1.00 mg/dL    GFR est non- >59 mL/min/1.73    GFR est  >59 mL/min/1.73    BUN/Creatinine ratio 19 9 - 23    Sodium 137 134 - 144 mmol/L    Potassium 4.3 3.5 - 5.2 mmol/L    Chloride 99 96 - 106 mmol/L    CO2 19 (L) 20 - 29 mmol/L    Calcium 9.5 8.7 - 10.2 mg/dL    Protein, total 6.5 6.0 - 8.5 g/dL    Albumin 4.1 3.5 - 5.5 g/dL    GLOBULIN, TOTAL 2.4 1.5 - 4.5 g/dL    A-G Ratio 1.7 1.2 - 2.2    Bilirubin, total 0.4 0.0 - 1.2 mg/dL    Alk. phosphatase 18 (L) 39 - 117 IU/L    AST (SGOT) 11 0 - 40 IU/L    ALT (SGPT) 18 0 - 32 IU/L   CBC WITH AUTOMATED DIFF   Result Value Ref Range    WBC 11.3 (H) 3.4 - 10.8 x10E3/uL    RBC 4.56 3.77 - 5.28 x10E6/uL    HGB 13.9 11.1 - 15.9 g/dL    HCT 40.8 34.0 - 46.6 %    MCV 90 79 - 97 fL    MCH 30.5 26.6 - 33.0 pg    MCHC 34.1 31.5 - 35.7 g/dL    RDW 13.1 12.3 - 15.4 %    PLATELET 585 524 - 488 x10E3/uL    NEUTROPHILS 77 Not Estab. %    Lymphocytes 15 Not Estab. %    MONOCYTES 7 Not Estab. %    EOSINOPHILS 0 Not Estab. %    BASOPHILS 0 Not Estab. %    ABS. NEUTROPHILS 8.7 (H) 1.4 - 7.0 x10E3/uL    Abs Lymphocytes 1.7 0.7 - 3.1 x10E3/uL    ABS. MONOCYTES 0.8 0.1 - 0.9 x10E3/uL    ABS. EOSINOPHILS 0.0 0.0 - 0.4 x10E3/uL    ABS. BASOPHILS 0.0 0.0 - 0.2 x10E3/uL    IMMATURE GRANULOCYTES 1 Not Estab. %    ABS. IMM. GRANS. 0.1 0.0 - 0.1 x10E3/uL   HEPATITIS B EVALUATION   Result Value Ref Range    Hep B surface Ag screen Negative Negative    Hepatitis Be Antigen Negative Negative    Hep B Core Ab, IgM Negative Negative    Hep B Core Ab, total Negative Negative    Hepatitis Be Antibody Negative Negative    HEP B SURFACE AB, QUAL Reactive    TSH 3RD GENERATION   Result Value Ref Range    TSH 0.995 0.450 - 4.500 uIU/mL        Exam:  Visit Vitals  /85 (BP 1 Location: Right arm, BP Patient Position: Sitting)   Pulse 75   Resp 16   Ht 5' 3\" (1.6 m)   Wt 80.3 kg (177 lb)   SpO2 98%   BMI 31.35 kg/m²     General:  Alert, cooperative, no distress. Head:  Normocephalic, without obvious abnormality, atraumatic.    Respiratory:  Heart:   Non labored breathing  Regular rate and rhythm, no murmurs   Neck:      Extremities: Warm, no cyanosis or edema. Pulses: 2+ radial pulses. Neurologic:  MS: Alert and oriented x 4, speech intact. Language intact. Attention and fund of knowledge appropriate. Recent and remote memory intact. Cranial Nerves:  II: visual fields VFF   II: pupils Equal, round, reactive to light   II: optic disc    III,VII: ptosis none   III,IV,VI: extraocular muscles  EOMI, no nystagmus or diplopia   V: facial light touch sensation     VII: facial muscle function   symmetric   VIII: hearing intact   IX: soft palate elevation  normal   XI: trapezius strength     XI: sternocleidomastoid strength    XII: tongue  Midline     Motor: normal bulk and tone, no tremor              Strength: 5/5 throughout, no PD  Sensory: Intact LT, PP  Coordination: FTN, HTS, and MIL intact  Gait: normal gait, able to tandem walk  Reflexes: 2+ symmetric       Assessment/Plan   Pt is a 27 y.o. RH female with RRMS, diagnosed after presenting with sudden onset of sensory changes in April, 2017,  tingling in bottom of feet that moved up her legs with walking, hands bilaterally in 4th and 5th digits, moving across to other fingers, tingling from bra line down to just above her pubic bone, around to sides and area felt tight. MRI of C and T spine with contrast on 4/15/17 with abnormal signal with enhancement and possible subtle mass effect in the cord at C4. MRI brain w/wo contrast 4/17/17 with multiple T2/Flair hyperintensities in the PV and subcortical WM, per radiology <9 lesions, with enhancing lesion in the anterior right temporal lobe.   Failed Copaxone, Aubagio, now on Ocrevus infusions 4/19/19 and 5/3/19.    -Next Ocrevus infusion in November, 2019.   -CMP and CBC with diff to monitor for toxicity  -Referral to GI, Dr. Klaus Yuan, for persistent bowel urgency and loose stools since receiving Ocrevus for MS.   -F/u in clinic in 6 months, instructed to call in the interim if needed. ICD-10-CM ICD-9-CM    1. [facility] G35 340 CBC WITH AUTOMATED DIFF      METABOLIC PANEL, COMPREHENSIVE   2. Loose stools R19.5 787.7 REFERRAL TO GASTROENTEROLOGY   3. Fecal urgency R15.2 787.63 REFERRAL TO GASTROENTEROLOGY       Signed:   Jaquan Leblanc MD  7/1/2019

## 2019-07-02 ENCOUNTER — TELEPHONE (OUTPATIENT)
Dept: NEUROLOGY | Age: 34
End: 2019-07-02

## 2019-07-02 LAB
ALBUMIN SERPL-MCNC: 4.4 G/DL (ref 3.5–5.5)
ALBUMIN/GLOB SERPL: 2 {RATIO} (ref 1.2–2.2)
ALP SERPL-CCNC: 19 IU/L (ref 39–117)
ALT SERPL-CCNC: 16 IU/L (ref 0–32)
AST SERPL-CCNC: 13 IU/L (ref 0–40)
BASOPHILS # BLD AUTO: 0 X10E3/UL (ref 0–0.2)
BASOPHILS NFR BLD AUTO: 0 %
BILIRUB SERPL-MCNC: 0.3 MG/DL (ref 0–1.2)
BUN SERPL-MCNC: 7 MG/DL (ref 6–20)
BUN/CREAT SERPL: 11 (ref 9–23)
CALCIUM SERPL-MCNC: 9.5 MG/DL (ref 8.7–10.2)
CHLORIDE SERPL-SCNC: 103 MMOL/L (ref 96–106)
CO2 SERPL-SCNC: 23 MMOL/L (ref 20–29)
CREAT SERPL-MCNC: 0.61 MG/DL (ref 0.57–1)
EOSINOPHIL # BLD AUTO: 0 X10E3/UL (ref 0–0.4)
EOSINOPHIL NFR BLD AUTO: 1 %
ERYTHROCYTE [DISTWIDTH] IN BLOOD BY AUTOMATED COUNT: 13 % (ref 12.3–15.4)
GLOBULIN SER CALC-MCNC: 2.2 G/DL (ref 1.5–4.5)
GLUCOSE SERPL-MCNC: 85 MG/DL (ref 65–99)
HCT VFR BLD AUTO: 38.8 % (ref 34–46.6)
HGB BLD-MCNC: 13.1 G/DL (ref 11.1–15.9)
IMM GRANULOCYTES # BLD AUTO: 0 X10E3/UL (ref 0–0.1)
IMM GRANULOCYTES NFR BLD AUTO: 0 %
LYMPHOCYTES # BLD AUTO: 1.4 X10E3/UL (ref 0.7–3.1)
LYMPHOCYTES NFR BLD AUTO: 21 %
MCH RBC QN AUTO: 30.3 PG (ref 26.6–33)
MCHC RBC AUTO-ENTMCNC: 33.8 G/DL (ref 31.5–35.7)
MCV RBC AUTO: 90 FL (ref 79–97)
MONOCYTES # BLD AUTO: 0.5 X10E3/UL (ref 0.1–0.9)
MONOCYTES NFR BLD AUTO: 7 %
NEUTROPHILS # BLD AUTO: 4.7 X10E3/UL (ref 1.4–7)
NEUTROPHILS NFR BLD AUTO: 71 %
PLATELET # BLD AUTO: 214 X10E3/UL (ref 150–450)
POTASSIUM SERPL-SCNC: 3.9 MMOL/L (ref 3.5–5.2)
PROT SERPL-MCNC: 6.6 G/DL (ref 6–8.5)
RBC # BLD AUTO: 4.32 X10E6/UL (ref 3.77–5.28)
SODIUM SERPL-SCNC: 139 MMOL/L (ref 134–144)
WBC # BLD AUTO: 6.6 X10E3/UL (ref 3.4–10.8)

## 2019-10-02 ENCOUNTER — TELEPHONE (OUTPATIENT)
Dept: NEUROLOGY | Age: 34
End: 2019-10-02

## 2019-10-02 NOTE — TELEPHONE ENCOUNTER
----- Message from Alvin Morales sent at 10/2/2019  2:42 PM EDT -----  Regarding: Dr. Farnaz King from [address] [place] [address] Dr requesting a call back in regards to a referral that was received. Best contact [phone] ext [phone].

## 2019-10-10 ENCOUNTER — TELEPHONE (OUTPATIENT)
Dept: NEUROLOGY | Age: 34
End: 2019-10-10

## 2019-10-10 NOTE — TELEPHONE ENCOUNTER
----- Message from Everton Radhaca sent at 10/10/2019 10:48 AM EDT -----  Regarding: Dr. Yaz Callaway/Telephone   Patient return call    Caller's first and last name and relationship (if not the patient): Jocelyn (covering for Yaz Lewis)     Best contact number(s): [phone] Ext. [phone]       Whose call is being returned: Aura Yi to clarify the request: Jocelyn from [facility]riti Herrera [facility] is returning Lindsay's call regarding a patient. She is covering for Yaz Lewis until she returns to work on Monday.

## 2019-10-16 NOTE — TELEPHONE ENCOUNTER
Spoke with Ruslan Malone. She wanted to know patient's next infusion date, informed her according to 's note she is due in November 2019, but I'm unsure if patient has scheduled that infusion. She verbalized understanding.

## 2019-10-29 RX ORDER — DIPHENHYDRAMINE HYDROCHLORIDE 50 MG/ML
50 INJECTION, SOLUTION INTRAMUSCULAR; INTRAVENOUS
Status: DISCONTINUED | OUTPATIENT
Start: 2019-11-01 | End: 2019-11-02 | Stop reason: HOSPADM

## 2019-10-29 RX ORDER — DIPHENHYDRAMINE HCL 25 MG
50 CAPSULE ORAL ONCE
Status: COMPLETED | OUTPATIENT
Start: 2019-11-01 | End: 2019-11-01

## 2019-10-29 RX ORDER — ACETAMINOPHEN 325 MG/1
650 TABLET ORAL
Status: DISCONTINUED | OUTPATIENT
Start: 2019-11-01 | End: 2019-11-02 | Stop reason: HOSPADM

## 2019-10-29 RX ORDER — ACETAMINOPHEN 325 MG/1
650 TABLET ORAL ONCE
Status: COMPLETED | OUTPATIENT
Start: 2019-11-01 | End: 2019-11-01

## 2019-10-29 RX ORDER — SODIUM CHLORIDE 9 MG/ML
25 INJECTION, SOLUTION INTRAVENOUS CONTINUOUS
Status: DISCONTINUED | OUTPATIENT
Start: 2019-11-01 | End: 2019-11-02 | Stop reason: HOSPADM

## 2019-11-01 ENCOUNTER — HOSPITAL ENCOUNTER (OUTPATIENT)
Dept: INFUSION THERAPY | Age: 34
Discharge: HOME | End: 2019-11-01
Payer: COMMERCIAL

## 2019-11-01 VITALS
TEMPERATURE: 97 F | RESPIRATION RATE: 16 BRPM | DIASTOLIC BLOOD PRESSURE: 66 MMHG | HEART RATE: 75 BPM | SYSTOLIC BLOOD PRESSURE: 102 MMHG

## 2019-11-01 LAB
ALBUMIN SERPL-MCNC: 3.5 G/DL (ref 3.5–5)
ALBUMIN/GLOB SERPL: 1.2 {RATIO} (ref 1.1–2.2)
ALP SERPL-CCNC: 28 U/L (ref 45–117)
ALT SERPL-CCNC: 26 U/L (ref 12–78)
ANION GAP SERPL CALC-SCNC: 6 MMOL/L (ref 5–15)
AST SERPL-CCNC: 13 U/L (ref 15–37)
BASOPHILS # BLD: 0 K/UL (ref 0–0.1)
BASOPHILS NFR BLD: 0 % (ref 0–1)
BILIRUB SERPL-MCNC: 0.2 MG/DL (ref 0.2–1)
BUN SERPL-MCNC: 12 MG/DL (ref 6–20)
BUN/CREAT SERPL: 16 (ref 12–20)
CALCIUM SERPL-MCNC: 8.6 MG/DL (ref 8.5–10.1)
CHLORIDE SERPL-SCNC: 110 MMOL/L (ref 97–108)
CO2 SERPL-SCNC: 25 MMOL/L (ref 21–32)
CREAT SERPL-MCNC: 0.73 MG/DL (ref 0.55–1.02)
DIFFERENTIAL METHOD BLD: ABNORMAL
EOSINOPHIL # BLD: 0 K/UL (ref 0–0.4)
EOSINOPHIL NFR BLD: 0 % (ref 0–7)
ERYTHROCYTE [DISTWIDTH] IN BLOOD BY AUTOMATED COUNT: 12 % (ref 11.5–14.5)
GLOBULIN SER CALC-MCNC: 3 G/DL (ref 2–4)
GLUCOSE SERPL-MCNC: 220 MG/DL (ref 65–100)
HCT VFR BLD AUTO: 37.9 % (ref 35–47)
HGB BLD-MCNC: 12.6 G/DL (ref 11.5–16)
IMM GRANULOCYTES # BLD AUTO: 0 K/UL (ref 0–0.04)
IMM GRANULOCYTES NFR BLD AUTO: 0 % (ref 0–0.5)
LYMPHOCYTES # BLD: 0.2 K/UL (ref 0.8–3.5)
LYMPHOCYTES NFR BLD: 2 % (ref 12–49)
MCH RBC QN AUTO: 30.1 PG (ref 26–34)
MCHC RBC AUTO-ENTMCNC: 33.2 G/DL (ref 30–36.5)
MCV RBC AUTO: 90.7 FL (ref 80–99)
MONOCYTES # BLD: 0 K/UL (ref 0–1)
MONOCYTES NFR BLD: 0 % (ref 5–13)
NEUTS SEG # BLD: 12.1 K/UL (ref 1.8–8)
NEUTS SEG NFR BLD: 98 % (ref 32–75)
NRBC # BLD: 0 K/UL (ref 0–0.01)
NRBC BLD-RTO: 0 PER 100 WBC
PLATELET # BLD AUTO: 171 K/UL (ref 150–400)
PMV BLD AUTO: 13 FL (ref 8.9–12.9)
POTASSIUM SERPL-SCNC: 3.9 MMOL/L (ref 3.5–5.1)
PROT SERPL-MCNC: 6.5 G/DL (ref 6.4–8.2)
RBC # BLD AUTO: 4.18 M/UL (ref 3.8–5.2)
RBC MORPH BLD: ABNORMAL
SODIUM SERPL-SCNC: 141 MMOL/L (ref 136–145)
WBC # BLD AUTO: 12.3 K/UL (ref 3.6–11)

## 2019-11-01 PROCEDURE — 96413 CHEMO IV INFUSION 1 HR: CPT

## 2019-11-01 PROCEDURE — 74011250636 HC RX REV CODE- 250/636: Performed by: PSYCHIATRY & NEUROLOGY

## 2019-11-01 PROCEDURE — 96375 TX/PRO/DX INJ NEW DRUG ADDON: CPT

## 2019-11-01 PROCEDURE — 36415 COLL VENOUS BLD VENIPUNCTURE: CPT

## 2019-11-01 PROCEDURE — 80053 COMPREHEN METABOLIC PANEL: CPT

## 2019-11-01 PROCEDURE — 74011250637 HC RX REV CODE- 250/637: Performed by: PSYCHIATRY & NEUROLOGY

## 2019-11-01 PROCEDURE — 96415 CHEMO IV INFUSION ADDL HR: CPT

## 2019-11-01 PROCEDURE — 96366 THER/PROPH/DIAG IV INF ADDON: CPT

## 2019-11-01 PROCEDURE — 96365 THER/PROPH/DIAG IV INF INIT: CPT

## 2019-11-01 PROCEDURE — 85025 COMPLETE CBC W/AUTO DIFF WBC: CPT

## 2019-11-01 RX ADMIN — DIPHENHYDRAMINE HYDROCHLORIDE 50 MG: 25 CAPSULE ORAL at 11:45

## 2019-11-01 RX ADMIN — SODIUM CHLORIDE 25 ML/HR: 900 INJECTION, SOLUTION INTRAVENOUS at 11:49

## 2019-11-01 RX ADMIN — ACETAMINOPHEN 650 MG: 325 TABLET ORAL at 11:45

## 2019-11-01 RX ADMIN — METHYLPREDNISOLONE SODIUM SUCCINATE 125 MG: 125 INJECTION, POWDER, FOR SOLUTION INTRAMUSCULAR; INTRAVENOUS at 11:45

## 2019-11-01 RX ADMIN — OCRELIZUMAB 600 MG: 300 INJECTION INTRAVENOUS at 12:00

## 2019-11-01 NOTE — PROGRESS NOTES
Outpatient Infusion Center Progress Note    [id number] Pt admit to [facility] for [address]. Pt ambulatory in stable condition. Assessment completed. No new concerns voiced. Peripheral IV established with positive blood return, labs drawn and sent per orders. Please see lab results when available in [address]. Line connected to NS at [facility]. Patient Vitals for the past 12 hrs:   Temp Pulse Resp BP   11/01/19 1600 97 °F (36.1 °C) 75 16 102/66   11/01/19 1300  76  109/68   11/01/19 1230 97 °F (36.1 °C) 80 16 111/69   11/01/19 1109 97 °F (36.1 °C) 78 18 112/70     Medications:  NS at [facility]  Tylenol 650 MG PO  Benadryl 50 MG PO  Solu-medrol 125 MG IVP  Ocrevus (titrated per order)    1600 Pt tolerated treatment well. Pt declines post observation period. VSS. PIV maintained positive blood return throughout treatment. Line flushed and removed. D/c home ambulatory in no distress. Pt aware of next appointment scheduled for 05/15/20.

## 2019-12-23 ENCOUNTER — OFFICE VISIT (OUTPATIENT)
Dept: NEUROLOGY | Age: 34
End: 2019-12-23

## 2019-12-23 VITALS
RESPIRATION RATE: 18 BRPM | TEMPERATURE: 98.1 F | SYSTOLIC BLOOD PRESSURE: 112 MMHG | DIASTOLIC BLOOD PRESSURE: 78 MMHG | HEART RATE: 73 BPM | OXYGEN SATURATION: 98 % | BODY MASS INDEX: 32.25 KG/M2 | WEIGHT: 182 LBS | HEIGHT: 63 IN

## 2019-12-23 DIAGNOSIS — Z79.899 ENCOUNTER FOR LONG-TERM CURRENT USE OF HIGH RISK MEDICATION: ICD-10-CM

## 2019-12-23 DIAGNOSIS — G35 MS (MULTIPLE SCLEROSIS) (HCC): Primary | ICD-10-CM

## 2019-12-23 NOTE — PROGRESS NOTES
Sarai Almaraz is a 27 y.o. female    Chief Complaint   Patient presents with    Follow-up    Multiple Sclerosis    Neurologic Problem     Blood pressure 112/78, pulse 73, temperature 98.1 °F (36.7 °C), temperature source Oral, resp. rate 18, height 5' 3\" (1.6 m), weight 182 lb (82.6 kg), SpO2 98 %. 1. Have you been to the ER, urgent care clinic since your last visit? Hospitalized since your last visit? No      2. Have you seen or consulted any other health care providers outside of the [address] [place] [address] since your last visit? Include any pap smears or colon screening.  Yes

## 2019-12-23 NOTE — PROGRESS NOTES
Neurology Progress Note      HISTORY PROVIDED BY: patient    Chief Complaint:   Chief Complaint   Patient presents with    Follow-up    Multiple Sclerosis    Neurologic Problem      Subjective:   Pt is a 27 y.o. RH female last seen in clinic on 7/1/19 in f/u for RRMS, diagnosed after presenting with sudden onset of sensory changes in April, 2017,  tingling in bottom of feet that moved up her legs with walking, hands bilaterally in 4th and 5th digits, moving across to other fingers, tingling from bra line down to just above her pubic bone, around to sides and area felt tight. MRI of C and T spine with contrast on 4/15/17 with abnormal signal with enhancement and possible subtle mass effect in the cord at C4. MRI brain w/wo contrast 4/17/17 with multiple T2/Flair hyperintensities in the PV and subcortical WM, per radiology <9 lesions, with enhancing lesion in the anterior right temporal lobe. Failed Copaxone, Aubagio, now on Ocrevus infusions 4/19/19 and 5/3/19. Last MRIs 3/21/19 were stable. -Next Ocrevus infusion in November, 2019.   -CMP and CBC with diff to monitor for toxicity  -Referred to GI, Dr. Gloria Michele, for persistent bowel urgency and loose stools since receiving Ocrevus for MS. She returns for f/u. CMP and CBC with diff 7/1/19 were normal.  She had her Ocrevus infusion in Nov.  She saw Dr. Gloria Michele and was treated for E. Coli overgrowth on Abx for a long time, finally starting to clear. Had colonoscopy last week, one benign appearing polyp seen. She has had numerous infections over last six months, gut E coli, perioral and periorbital dermatitis, laryngitis and sinus infections. Labs 11/1/19 ordered by PCP, had elevated WBC count with 98% neutrophils, low abs lympocytes, only 0.2. No new neurological deficits, feeling well. Previous imaging/DMTs:  MRI of C and T spine with contrast on 4/15/17 with abnormal signal with enhancement and possible subtle mass effect in the cord at C4.    MRI brain w/wo contrast 4/17/17 with multiple T2/Flair hyperintensities in the PV and subcortical WM, per radiology <9 lesions, with enhancing lesion in the anterior right temporal lobe. Started Copaxone 6/30/17,   Had exacerbation 2/6/18, MRI brain 2/6/18 with multiple new enhancing lesions   Had another exacerbation 3/12/18 with MRI brain 3/13/18 with significant progression of lesions with multiple enhancing lesions since MRI brain one month prior   MRI C-spine 3/26/18 with improvement in hyperintensity and cord expansion at C4. Had another exacerbation 4/6/18, treated with Acthar x 5 days. Switched to [employer] 4/24/18 after her insurance company denied treatment with Tysabri. Had progression of disease on MRI brain w/wo contrast 3/21/19 with several new enhancing lesions. MRI C-spine w/wo contrast 3/21/19 was stable without new or enhancing lesions seen. Started [employer] 4/19/19    Past Medical History:   Diagnosis Date    Encounter for long-term (current) use of high-risk medication     Multiple sclerosis ([address]     LP on 4/18/17 with normal OP 21mmHg, CSF studies were normal except +OGB and elevated IgG Index. Past Surgical History:   Procedure Laterality Date    HX ROTATOR CUFF REPAIR Right 1999      Social History     Socioeconomic History    Marital status:      Spouse name: Not on file    Number of children: Not on file    Years of education: Not on file    Highest education level: Not on file   Occupational History    Occupation:  at [address] [place] [address] Drive Financial resource strain: Not on file    Food insecurity:     Worry: Not on file     Inability: Not on file   Physician [employer] needs:     Medical: Not on file     Non-medical: Not on file   Tobacco Use    Smoking status: Never Smoker    Smokeless tobacco: Never Used    Tobacco comment: smokes socially   Substance and Sexual Activity    Alcohol use:  Yes     Alcohol/week: 2.0 - 3.0 standard drinks     Types: 2 - 3 Standard drinks or equivalent per week    Drug use: No    Sexual activity: Not on file   Lifestyle    Physical activity:     Days per week: Not on file     Minutes per session: Not on file    Stress: Not on file   Relationships    Social connections:     Talks on phone: Not on file     Gets together: Not on file     Attends Denominational service: Not on file     Active member of club or organization: Not on file     Attends meetings of clubs or organizations: Not on file     Relationship status: Not on file    Intimate partner violence:     Fear of current or ex partner: Not on file     Emotionally abused: Not on file     Physically abused: Not on file     Forced sexual activity: Not on file   Other Topics Concern    Not on file   Social History Narrative    Lives in [address] with      Family History   Problem Relation Age of Onset    MS Mother         Dec 48yo    Other Father         Estranged    No Known Problems Sister     No Known Problems Brother     Other Brother         Hep C         Objective:   Review of Systems : Per HPI, o/w neg    No Known Allergies     Meds:  Outpatient Medications Prior to Visit   Medication Sig Dispense Refill    ocrelizumab (OCREVUS) 30 mg/mL soln injection 1 mL by IntraVENous route every 6 months. 1 mL 2    cyanocobalamin (VITAMIN B-12) 1,000 mcg tablet Take 1,000 mcg by mouth daily.  multivitamin (ONE A DAY) tablet Take 1 Tab by mouth daily.  cholecalciferol (VITAMIN D3) 1,000 unit cap Take 1,000 Units by mouth daily.  norgestimate-ethinyl estradiol (SPRINTEC, 28,) 0.25-35 mg-mcg tab Take 1 Tab by mouth daily.  predniSONE (DELTASONE) 10 mg tablet Take 6 tabs orally x 2 days, then 4 tabs daily x 2 days, then 2 tabs daily x 2 days, then 1 tab daily x 2 days, then 1/2 tab daily x 2 days 27 Tab 0    guaiFENesin ER (MUCINEX) 600 mg ER tablet Take 600 mg by mouth two (2) times a day.       DOCOSAHEXANOIC ACID/EPA (FISH OIL PO) Take  by mouth.  sour cherry extract 1,000 mg cap Take 1 Cap by mouth daily.  TURMERIC ROOT EXTRACT PO Take 1 Cap by mouth daily. No facility-administered medications prior to visit. Imaging:  MRI Results (most recent):  Results from Hospital Encounter encounter on 03/21/19   MRI CERV SPINE W WO CONT    Narrative EXAM: MRI CERV SPINE W WO CONT    INDICATION: Pt with MS, assess for disease progression. COMPARISON: 3/26/2018    TECHNIQUE: MR imaging of the cervical spine was performed using the following  sequences: sagittal T1, T2, STIR;  axial T2, T1 prior to and following contrast  administration. CONTRAST: 15 mL of Dotarem gadolinium contrast.    FINDINGS:    There is normal alignment of the cervical spine. Vertebral body heights are  maintained. Marrow signal is normal.    The craniocervical junction is intact. Focal T2 hyperintense signal change is  redemonstrated in the cord at the C3-C4 level, unchanged. No new lesions or  associated enhancement. Posterior fossa signal changes redemonstrated. See separate report for brain  MRI. C2-C3: No herniation or stenosis. C3-C4: Minimal right-sided uncovertebral joint hypertrophy. No neuroforaminal  narrowing or spinal canal stenosis. C4-C5: Minimal right-sided uncovertebral joint hypertrophy. No neuroforaminal  narrowing or spinal canal stenosis. C5-C6: Minimal central disc protrusion with associated annular fissure, which  has developed in the interval. No significant neuroforaminal narrowing or spinal  canal stenosis. C6-C7: No herniation or stenosis. C7-T1: No herniation or stenosis. Impression IMPRESSION:    1. Chronic signal changes at the C3-C4 level. No new lesions. No enhancement to  suggest active demyelination. 2. New minimal central disc protrusion at the C5-C6 level. No neuroforaminal  narrowing or spinal canal stenosis.       CT Results (most recent):  Results from [facility] encounter on 04/09/17   CT HEAD WO CONT    Narrative EXAM:  CT HEAD WO CONT    INDICATION:   Head trauma, closed, mild, GCS >= 13, no risk factors, neuro exam  normal    COMPARISON: None. TECHNIQUE: Unenhanced CT of the head was performed using 5 mm images. Brain and  bone windows were generated. CT dose reduction was achieved through use of a  standardized protocol tailored for this examination and automatic exposure  control for dose modulation. FINDINGS:  There is no extra-axial fluid collection hemorrhage shift or masses her. Ventricles are normal in size and midline. Impression  impression: No acute changes. Reviewed records in [employer] tab today    Lab Review   Results for orders placed or performed during the hospital encounter of 11/01/19   CBC WITH AUTOMATED DIFF   Result Value Ref Range    WBC 12.3 (H) 3.6 - 11.0 K/uL    RBC 4.18 3.80 - 5.20 M/uL    HGB 12.6 11.5 - 16.0 g/dL    HCT 37.9 35.0 - 47.0 %    MCV 90.7 80.0 - 99.0 FL    MCH 30.1 26.0 - 34.0 PG    MCHC 33.2 30.0 - 36.5 g/dL    RDW 12.0 11.5 - 14.5 %    PLATELET 804 165 - 813 K/uL    MPV 13.0 (H) 8.9 - 12.9 FL    NRBC 0.0 0  WBC    ABSOLUTE NRBC 0.00 0.00 - 0.01 K/uL    NEUTROPHILS 98 (H) 32 - 75 %    LYMPHOCYTES 2 (L) 12 - 49 %    MONOCYTES 0 (L) 5 - 13 %    EOSINOPHILS 0 0 - 7 %    BASOPHILS 0 0 - 1 %    IMMATURE GRANULOCYTES 0 0.0 - 0.5 %    ABS. NEUTROPHILS 12.1 (H) 1.8 - 8.0 K/UL    ABS. LYMPHOCYTES 0.2 (L) 0.8 - 3.5 K/UL    ABS. MONOCYTES 0.0 0.0 - 1.0 K/UL    ABS. EOSINOPHILS 0.0 0.0 - 0.4 K/UL    ABS. BASOPHILS 0.0 0.0 - 0.1 K/UL    ABS. IMM.  GRANS. 0.0 0.00 - 0.04 K/UL    DF SMEAR SCANNED      RBC COMMENTS NORMOCYTIC, NORMOCHROMIC     METABOLIC PANEL, COMPREHENSIVE   Result Value Ref Range    Sodium 141 136 - 145 mmol/L    Potassium 3.9 3.5 - 5.1 mmol/L    Chloride 110 (H) 97 - 108 mmol/L    CO2 25 21 - 32 mmol/L    Anion gap 6 5 - 15 mmol/L    Glucose 220 (H) 65 - 100 mg/dL    BUN 12 6 - 20 MG/DL    Creatinine 0.73 0.55 - 1.02 MG/DL    BUN/Creatinine ratio 16 12 - 20      GFR est AA >60 >60 ml/min/1.73m2    GFR est non-AA >60 >60 ml/min/1.73m2    Calcium 8.6 8.5 - 10.1 MG/DL    Bilirubin, total 0.2 0.2 - 1.0 MG/DL    ALT (SGPT) 26 12 - 78 U/L    AST (SGOT) 13 (L) 15 - 37 U/L    Alk. phosphatase 28 (L) 45 - 117 U/L    Protein, total 6.5 6.4 - 8.2 g/dL    Albumin 3.5 3.5 - 5.0 g/dL    Globulin 3.0 2.0 - 4.0 g/dL    A-G Ratio 1.2 1.1 - 2.2          Exam:  Visit Vitals  /78 (BP 1 Location: Right arm, BP Patient Position: Sitting)   Pulse 73   Temp 98.1 °F (36.7 °C) (Oral)   Resp 18   Ht 5' 3\" (1.6 m)   Wt 82.6 kg (182 lb)   SpO2 98%   BMI 32.24 kg/m²     General:  Alert, cooperative, no distress. Head:  Normocephalic, without obvious abnormality, atraumatic. Patch of swelling and erythema adjacent to right lower lip. Respiratory:  Heart:   Non labored breathing  Regular rate and rhythm, no murmurs   Neck:      Extremities: Warm, no cyanosis or edema. Pulses: 2+ radial pulses. Neurologic:  MS: Alert and oriented x 4, speech intact. Language intact. Attention and fund of knowledge appropriate. Recent and remote memory intact.   Cranial Nerves:  II: visual fields VFF   II: pupils Equal, round, reactive to light   II: optic disc    III,VII: ptosis none   III,IV,VI: extraocular muscles  EOMI, no nystagmus or diplopia   V: facial light touch sensation     VII: facial muscle function   symmetric   VIII: hearing intact   IX: soft palate elevation  normal   XI: trapezius strength     XI: sternocleidomastoid strength    XII: tongue  Midline     Motor: normal bulk and tone, no tremor              Strength: 5/5 throughout, no PD  Sensory: Intact LT, PP  Coordination: FTN, HTS, and MIL intact  Gait: normal gait, able to tandem walk  Reflexes: 2+ symmetric       Assessment/Plan   Pt is a 27 y.o. RH female with RRMS, diagnosed after presenting with sudden onset of sensory changes in April, 2017, tingling in bottom of feet that moved up her legs with walking, hands bilaterally in 4th and 5th digits, moving across to other fingers, tingling from bra line down to just above her pubic bone, around to sides and area felt tight. MRI of C and T spine with contrast on 4/15/17 with abnormal signal with enhancement and possible subtle mass effect in the cord at C4. MRI brain w/wo contrast 4/17/17 with multiple T2/Flair hyperintensities in the PV and subcortical WM, per radiology <9 lesions, with enhancing lesion in the anterior right temporal lobe. Failed Copaxone, Aubagio, now on Ocrevus infusions 4/19/19, 5/3/19, 11/2019. Last MRIs 3/21/19 were stable. -Next Ocrevus infusion in May, 2020.   -MRI brain and C-spine without contrast 3/2020  -CMP, CBC with diff, TSH to monitor for toxicity  -Will monitor ongoing infections and f/u lymphocyte count. May need to reconsider tx with Bertha Alt, though currently well controlled from an MS standpoint.   -Pt is getting appropriate routine cancer screenings  -F/u in clinic in 6 months, instructed to call in the interim if needed. ICD-10-CM ICD-9-CM    1. MS (multiple sclerosis) ([facility] [address] A63 [id number] METABOLIC PANEL, COMPREHENSIVE      CBC WITH AUTOMATED DIFF      TSH 3RD GENERATION      MRI BRAIN WO CONT      MRI CERV SPINE WO CONT   2. Encounter for long-term current use of high risk medication Z28.862 F35.01 METABOLIC PANEL, COMPREHENSIVE      CBC WITH AUTOMATED DIFF      TSH 3RD GENERATION      MRI BRAIN WO CONT      MRI CERV SPINE WO CONT       Signed:   Chidi Monzon MD  12/23/2019

## 2019-12-23 NOTE — LETTER
12/23/19 Patient: Jesus Barrios YOB: 1985 Date of Visit: 12/23/2019 Shayla Marquez MD 
[address]  [address] [place] [address] VIA Facsimile: [phone] Dear Shayla Marquez MD, Thank you for referring Ms. Jesus Barrios to [address] [place] [address] for evaluation. My notes for this consultation are attached. If you have questions, please do not hesitate to call me. I look forward to following your patient along with you. Sincerely, Saleem Calixto MD

## 2019-12-24 LAB
ALBUMIN SERPL-MCNC: 4.2 G/DL (ref 3.5–5.5)
ALBUMIN/GLOB SERPL: 2.1 {RATIO} (ref 1.2–2.2)
ALP SERPL-CCNC: 31 IU/L (ref 39–117)
ALT SERPL-CCNC: 20 IU/L (ref 0–32)
AST SERPL-CCNC: 17 IU/L (ref 0–40)
BASOPHILS # BLD AUTO: 0 X10E3/UL (ref 0–0.2)
BASOPHILS NFR BLD AUTO: 0 %
BILIRUB SERPL-MCNC: <0.2 MG/DL (ref 0–1.2)
BUN SERPL-MCNC: 8 MG/DL (ref 6–20)
BUN/CREAT SERPL: 12 (ref 9–23)
CALCIUM SERPL-MCNC: 8.8 MG/DL (ref 8.7–10.2)
CHLORIDE SERPL-SCNC: 104 MMOL/L (ref 96–106)
CO2 SERPL-SCNC: 21 MMOL/L (ref 20–29)
CREAT SERPL-MCNC: 0.65 MG/DL (ref 0.57–1)
EOSINOPHIL # BLD AUTO: 0.1 X10E3/UL (ref 0–0.4)
EOSINOPHIL NFR BLD AUTO: 1 %
ERYTHROCYTE [DISTWIDTH] IN BLOOD BY AUTOMATED COUNT: 11.7 % (ref 12.3–15.4)
GLOBULIN SER CALC-MCNC: 2 G/DL (ref 1.5–4.5)
GLUCOSE SERPL-MCNC: 82 MG/DL (ref 65–99)
HCT VFR BLD AUTO: 37.5 % (ref 34–46.6)
HGB BLD-MCNC: 13.2 G/DL (ref 11.1–15.9)
IMM GRANULOCYTES # BLD AUTO: 0.1 X10E3/UL (ref 0–0.1)
IMM GRANULOCYTES NFR BLD AUTO: 1 %
LYMPHOCYTES # BLD AUTO: 1.5 X10E3/UL (ref 0.7–3.1)
LYMPHOCYTES NFR BLD AUTO: 18 %
MCH RBC QN AUTO: 30.4 PG (ref 26.6–33)
MCHC RBC AUTO-ENTMCNC: 35.2 G/DL (ref 31.5–35.7)
MCV RBC AUTO: 86 FL (ref 79–97)
MONOCYTES # BLD AUTO: 0.6 X10E3/UL (ref 0.1–0.9)
MONOCYTES NFR BLD AUTO: 7 %
NEUTROPHILS # BLD AUTO: 6.1 X10E3/UL (ref 1.4–7)
NEUTROPHILS NFR BLD AUTO: 73 %
PLATELET # BLD AUTO: 239 X10E3/UL (ref 150–450)
POTASSIUM SERPL-SCNC: 4.4 MMOL/L (ref 3.5–5.2)
PROT SERPL-MCNC: 6.2 G/DL (ref 6–8.5)
RBC # BLD AUTO: 4.34 X10E6/UL (ref 3.77–5.28)
SODIUM SERPL-SCNC: 141 MMOL/L (ref 134–144)
TSH SERPL DL<=0.005 MIU/L-ACNC: 1.17 UIU/ML (ref 0.45–4.5)
WBC # BLD AUTO: 8.4 X10E3/UL (ref 3.4–10.8)

## 2020-01-06 ENCOUNTER — DOCUMENTATION ONLY (OUTPATIENT)
Dept: NEUROLOGY | Age: 35
End: 2020-01-06

## 2020-01-06 NOTE — PROGRESS NOTES
Labs from Dr. Victorina Blackburn received-celiac antibodies-negative D emanated gliadin antibodies, negative TTG, negative endomysial antibodies, CBC unremarkable  Results sent to scanning.

## 2020-02-14 ENCOUNTER — TELEPHONE (OUTPATIENT)
Dept: NEUROLOGY | Age: 35
End: 2020-02-14

## 2020-02-14 NOTE — TELEPHONE ENCOUNTER
Pt calling, she is requesting a doctors note for a stand up desk due to her MS. She stated sitting for 8 hours makes her really stiff.  Please call back

## 2020-02-18 ENCOUNTER — DOCUMENTATION ONLY (OUTPATIENT)
Dept: NEUROLOGY | Age: 35
End: 2020-02-18

## 2020-03-10 ENCOUNTER — TELEPHONE (OUTPATIENT)
Dept: NEUROLOGY | Age: 35
End: 2020-03-10

## 2020-03-10 ENCOUNTER — HOSPITAL ENCOUNTER (OUTPATIENT)
Dept: MRI IMAGING | Age: 35
Discharge: HOME | End: 2020-03-10
Payer: COMMERCIAL

## 2020-03-10 DIAGNOSIS — G35 MS (MULTIPLE SCLEROSIS) (HCC): ICD-10-CM

## 2020-03-10 DIAGNOSIS — Z79.899 ENCOUNTER FOR LONG-TERM CURRENT USE OF HIGH RISK MEDICATION: ICD-10-CM

## 2020-03-10 PROCEDURE — 72141 MRI NECK SPINE W/O DYE: CPT

## 2020-03-10 PROCEDURE — 70551 MRI BRAIN STEM W/O DYE: CPT

## 2020-03-10 NOTE — PROGRESS NOTES
Preet Felipe - Please call pt: MRI brain and C-spine wo contrast 3/10/20 - stable without new lesions.

## 2020-03-10 NOTE — PROGRESS NOTES
Leory Heimlich - Please call pt: MRI brain and C-spine wo contrast 3/10/20 - stable without new lesions.

## 2020-03-27 ENCOUNTER — TELEPHONE (OUTPATIENT)
Dept: NEUROLOGY | Age: 35
End: 2020-03-27

## 2020-03-27 NOTE — TELEPHONE ENCOUNTER
Zelalem corona/ Angella Dawn calling stating the pt's insurance doesn't cover General acute hospital site.  She is requesting a call back

## 2020-03-27 NOTE — TELEPHONE ENCOUNTER
Returned call [facility] was unavailable spoke with Ewelina Dwyer she was able to clarify request. Patient's insurance [employer] apparently doesn't allow hospital billing for infusion sites and [facility] bills as a hospital. The question was should they seek another company or did we have a company in mind. Noted that they should seek another company and we can refax referral if needed or other paperwork.

## 2020-04-07 ENCOUNTER — TELEPHONE (OUTPATIENT)
Dept: NEUROLOGY | Age: 35
End: 2020-04-07

## 2020-04-08 NOTE — TELEPHONE ENCOUNTER
Dr. Naren Hebert I do have the cards for a couple of home infusion companies but I'm not sure they do the Ocrevus infusion for this patient. [facility] and [facility]. Would you like me to suggest to patient a delay in Tx until June?

## 2020-04-08 NOTE — TELEPHONE ENCOUNTER
Jorje Bermeo - Please call pt: We are recommending delaying Ocrevus infusions until after COVID-19 threat is reduced, so late June/early July.

## 2020-04-08 NOTE — TELEPHONE ENCOUNTER
Dr. Shahrzad Wen I called the patient and she was really concerned that she has been doing so well with the Ocrevus and doesn't want to delay treatment, possibly getting a new lesion, buying herself a 5 day Prenisone infusion. She wants to know if Home infusion is completely off the table. I tried to explain to her that the companies we have access too mainly infuse certain things or deal with a certain insurance population. I noted that you were gone for today and I will call back whenever you respond to me.

## 2020-04-09 NOTE — TELEPHONE ENCOUNTER
Called pt:  Discussed situation. Ocrevus, an Anti - CD20 therapy, generally felt to be safe or not impart worsened outcome from viral infection. That being said, has been suggested to possibly delay repeat infusions during COVID-19 crisis. I recommend delaying from May until June, in part to keep her out of the infusion center. Pt has not left her home in the last 26 days, practicing CDC recommended guidelines to avoid infection.

## 2020-05-06 ENCOUNTER — TELEPHONE (OUTPATIENT)
Dept: NEUROLOGY | Age: 35
End: 2020-05-06

## 2020-05-06 NOTE — TELEPHONE ENCOUNTER
----- Message from Delvis To sent at 5/6/2020 11:38 AM EDT -----  Regarding: Dr. Ayleen Posadas  General Message/Vendor Calls    Caller's first and last name:  José Miguel Gutiérrez w/[employer]      Reason for call: Medication Stella Perales was requested and she is reviewing site of care       Callback required yes/no and why: Yes    Best contact number(s): [phone]        Details to clarify the request:      Delvis To

## 2020-05-07 NOTE — TELEPHONE ENCOUNTER
Dr Dami Ruiz with [facility] calling to see if pt has to do infusion at [facility]. Her insurance will not cover a hospital base, however they will cover a freestanding infusion center or doctors office. Please call.

## 2020-05-11 NOTE — TELEPHONE ENCOUNTER
Left message for Jared Hogan that message was sent to  last week for review. Also that  would like to have infusion done in June per 4/9/20 note.

## 2020-05-12 NOTE — TELEPHONE ENCOUNTER
Needing clarifcation if pt is aware that she needs to wait until June, after speaking with pt he feels she is unaware that she needs to wait until June. Please call.

## 2020-05-12 NOTE — TELEPHONE ENCOUNTER
Spoke with Angy Wade, informed him I left a message for patient to call me back to talk about her infusion.

## 2020-05-12 NOTE — TELEPHONE ENCOUNTER
Spoke with patient, she states she has no new issues that would warrant an infusion right away. C/o tingling to feet that comes and goes, no new weakness. She still wanted to get 's opinion on getting infusion done in June. She will need to go to a satellite location.

## 2020-05-13 RX ORDER — OCRELIZUMAB 300 MG/10ML
30 INJECTION INTRAVENOUS
Qty: 1 ML | Refills: 2 | Status: SHIPPED | OUTPATIENT
Start: 2020-05-13 | End: 2021-05-24 | Stop reason: DRUGHIGH

## 2020-05-13 NOTE — TELEPHONE ENCOUNTER
Spoke with Moni Garcia with the infusion suite at [facility]. She needs demo/insurance/last office note/MRI report/order form faxed to her.     Her fax [phone]

## 2020-05-13 NOTE — TELEPHONE ENCOUNTER
Called pt: Jayden Lemus is now refusing to allow Ocrevus infusion at [facility] b/c it is hospital based. They are forcing her to go to a stand alone infusion center, [facility], 2 infuse, at [facility]fusion due 5/5020, last infusion was 11/1/19. Wu Galindo - Please call insurance company and find out why they are calling. Please contact the new infusion center and ask if they have an infusion order form for Ocrevus infusion for me to complete. I printed the new Rx and previous infusion order from [facility].

## 2020-05-15 ENCOUNTER — APPOINTMENT (OUTPATIENT)
Dept: INFUSION THERAPY | Age: 35
End: 2020-05-15
Payer: COMMERCIAL

## 2020-05-18 NOTE — TELEPHONE ENCOUNTER
Spoke with patient, informed her I had paperwork for her infusion and will fax to [facility] this morning. Left message for Jacquelyn Dickson with same information.

## 2020-05-19 ENCOUNTER — TELEPHONE (OUTPATIENT)
Dept: NEUROLOGY | Age: 35
End: 2020-05-19

## 2020-05-19 NOTE — TELEPHONE ENCOUNTER
Kaia Soto calling stating he spoke w/ pt today and the next appt for infusion wouldn't be until 6/8 because they are moving locations.  Grisel Vicente is authorizing a one time Ocrevus dose 600mg to the hospital. Please call back    Authorization Number: [id number] 5/19/20-6/19/20

## 2020-05-20 NOTE — TELEPHONE ENCOUNTER
Left message for Claretha Mcardle that we received his message. If he needs anything further to call back.

## 2020-05-21 ENCOUNTER — HOSPITAL ENCOUNTER (OUTPATIENT)
Dept: INFUSION THERAPY | Age: 35
End: 2020-05-21
Payer: COMMERCIAL

## 2020-05-25 RX ORDER — ACETAMINOPHEN 325 MG/1
650 TABLET ORAL ONCE
Status: COMPLETED | OUTPATIENT
Start: 2020-05-29 | End: 2020-05-29

## 2020-05-25 RX ORDER — DIPHENHYDRAMINE HCL 25 MG
50 CAPSULE ORAL ONCE
Status: COMPLETED | OUTPATIENT
Start: 2020-05-29 | End: 2020-05-29

## 2020-05-25 RX ORDER — ACETAMINOPHEN 325 MG/1
650 TABLET ORAL
Status: DISCONTINUED | OUTPATIENT
Start: 2020-05-29 | End: 2020-05-30 | Stop reason: HOSPADM

## 2020-05-25 RX ORDER — SODIUM CHLORIDE 9 MG/ML
25 INJECTION, SOLUTION INTRAVENOUS CONTINUOUS
Status: DISCONTINUED | OUTPATIENT
Start: 2020-05-29 | End: 2020-05-30 | Stop reason: HOSPADM

## 2020-05-25 RX ORDER — DIPHENHYDRAMINE HYDROCHLORIDE 50 MG/ML
50 INJECTION, SOLUTION INTRAMUSCULAR; INTRAVENOUS
Status: DISCONTINUED | OUTPATIENT
Start: 2020-05-29 | End: 2020-05-30 | Stop reason: HOSPADM

## 2020-05-29 ENCOUNTER — HOSPITAL ENCOUNTER (OUTPATIENT)
Dept: INFUSION THERAPY | Age: 35
Discharge: HOME | End: 2020-05-29
Payer: COMMERCIAL

## 2020-05-29 VITALS
RESPIRATION RATE: 16 BRPM | HEART RATE: 82 BPM | DIASTOLIC BLOOD PRESSURE: 82 MMHG | TEMPERATURE: 97.5 F | SYSTOLIC BLOOD PRESSURE: 124 MMHG

## 2020-05-29 PROCEDURE — 96365 THER/PROPH/DIAG IV INF INIT: CPT

## 2020-05-29 PROCEDURE — 74011250636 HC RX REV CODE- 250/636: Performed by: PSYCHIATRY & NEUROLOGY

## 2020-05-29 PROCEDURE — 96413 CHEMO IV INFUSION 1 HR: CPT

## 2020-05-29 PROCEDURE — 96375 TX/PRO/DX INJ NEW DRUG ADDON: CPT

## 2020-05-29 PROCEDURE — 74011250637 HC RX REV CODE- 250/637: Performed by: PSYCHIATRY & NEUROLOGY

## 2020-05-29 PROCEDURE — 96415 CHEMO IV INFUSION ADDL HR: CPT

## 2020-05-29 PROCEDURE — 96366 THER/PROPH/DIAG IV INF ADDON: CPT

## 2020-05-29 RX ADMIN — DIPHENHYDRAMINE HYDROCHLORIDE 50 MG: 25 CAPSULE ORAL at 08:14

## 2020-05-29 RX ADMIN — SODIUM CHLORIDE 25 ML/HR: 900 INJECTION, SOLUTION INTRAVENOUS at 08:15

## 2020-05-29 RX ADMIN — OCRELIZUMAB 600 MG: 300 INJECTION INTRAVENOUS at 08:42

## 2020-05-29 RX ADMIN — METHYLPREDNISOLONE SODIUM SUCCINATE 125 MG: 125 INJECTION, POWDER, FOR SOLUTION INTRAMUSCULAR; INTRAVENOUS at 08:16

## 2020-05-29 RX ADMIN — ACETAMINOPHEN 650 MG: 325 TABLET ORAL at 08:14

## 2020-05-29 NOTE — PROGRESS NOTES
Outpatient Infusion Center Progress Note    0800 Pt admit to [address] for [address] ambulatory in stable condition. Assessment completed. No new concerns voiced. Peripheral IV established in the LEFT AC with positive blood return. Medications ordered from pharmacy. Visit Vitals  /82 (BP 1 Location: Right arm, BP Patient Position: Sitting)   Pulse 82   Temp 97.5 °F (36.4 °C)   Resp 16   Breastfeeding No       Medications Administered     0.9% sodium chloride infusion     Admin Date  05/29/2020 Action  New Bag Dose  25 mL/hr Rate  25 mL/hr Route  IntraVENous Administered By  Rollo Tse          acetaminophen (TYLENOL) tablet 650 mg     Admin Date  05/29/2020 Action  Given Dose  650 mg Route  Oral Administered By  Rollo Tse          diphenhydrAMINE (BENADRYL) capsule 50 mg     Admin Date  05/29/2020 Action  Given Dose  50 mg Route  Oral Administered By  Rollo Tse          methylPREDNISolone (PF) (Solu-MEDROL) injection 125 mg     Admin Date  05/29/2020 Action  Given Dose  125 mg Route  IntraVENous Administered By  Rollo Tse          ocrelizumab (OCREVUS) 600 mg in 0.9% sodium chloride 500 mL, overfill volume 50 mL infusion     Admin Date  05/29/2020 Action  New Bag Dose  600 mg Rate  40 mL/hr Route  IntraVENous Administered By  Rollo Tse                1300 Pt tolerated treatment well. Peripheral IV maintained positive blood return throughout the course of treatment and was removed at the conclusion of therapy. D/c home ambulatory in no distress. Pt aware of next appointment scheduled.     Marcio Gallo RN

## 2020-06-09 ENCOUNTER — OFFICE VISIT (OUTPATIENT)
Dept: NEUROLOGY | Age: 35
End: 2020-06-09

## 2020-06-09 VITALS
OXYGEN SATURATION: 97 % | SYSTOLIC BLOOD PRESSURE: 128 MMHG | HEART RATE: 88 BPM | BODY MASS INDEX: 31.89 KG/M2 | WEIGHT: 180 LBS | RESPIRATION RATE: 16 BRPM | DIASTOLIC BLOOD PRESSURE: 78 MMHG | HEIGHT: 63 IN

## 2020-06-09 DIAGNOSIS — Z79.899 ENCOUNTER FOR LONG-TERM CURRENT USE OF HIGH RISK MEDICATION: ICD-10-CM

## 2020-06-09 DIAGNOSIS — G35 MS (MULTIPLE SCLEROSIS) (HCC): Primary | ICD-10-CM

## 2020-06-09 RX ORDER — ETHYNODIOL DIACETATE AND ETHINYL ESTRADIOL 1 MG-35MCG
KIT ORAL
COMMUNITY

## 2020-06-09 NOTE — PROGRESS NOTES
Ms. Crenshaw Bonifacio is following up MS. Patient denies any current MS symptoms. Depression screening done on patient.

## 2020-06-09 NOTE — PATIENT INSTRUCTIONS
PRESCRIPTION REFILL POLICY [facility] Statement to Patients April 1, 2014 In an effort to ensure the large volume of patient prescription refills is processed in the most efficient and expeditious manner, we are asking our patients to assist us by calling your Pharmacy for all prescription refills, this will include also your  Mail Order Pharmacy. The pharmacy will contact our office electronically to continue the refill process. Please do not wait until the last minute to call your pharmacy. We need at least 48 hours (2days) to fill prescriptions. We also encourage you to call your pharmacy before going to  your prescription to make sure it is ready. With regard to controlled substance prescription refill requests (narcotic refills) that need to be picked up at our office, we ask your cooperation by providing us with at least 72 hours (3days) notice that you will need a refill. We will not refill narcotic prescription refill requests after 4:00pm on any weekday, Monday through Thursday, or after 2:00pm on Fridays, or on the weekends. We encourage everyone to explore another way of getting your prescription refill request processed using [email or web address], our patient web portal through our electronic medical record system. [email or web address] is an efficient and effective way to communicate your medication request directly to the office and  downloadable as an darrin on your smart phone . [email or web address] also features a review functionality that allows you to view your medication list as well as leave messages for your physician. Are you ready to get connected? If so please review the attatched instructions or speak to any of our staff to get you set up right away! Thank you so much for your cooperation. Should you have any questions please contact our Practice Administrator. The Physicians and Staff,  [facility]

## 2020-06-09 NOTE — PROGRESS NOTES
Neurology Progress Note      HISTORY PROVIDED BY: patient    Chief Complaint:   Chief Complaint   Patient presents with    Multiple Sclerosis      Subjective:   Pt is a 38 y.o. RH female last seen in clinic on 12/23/19 in f/u for RRMS, diagnosed after presenting with sudden onset of sensory changes in April, 2017,  tingling in bottom of feet that moved up her legs with walking, hands bilaterally in 4th and 5th digits, moving across to other fingers, tingling from bra line down to just above her pubic bone, around to sides and area felt tight. MRI of C and T spine with contrast on 4/15/17 with abnormal signal with enhancement and possible subtle mass effect in the cord at C4. MRI brain w/wo contrast 4/17/17 with multiple T2/Flair hyperintensities in the PV and subcortical WM, per radiology <9 lesions, with enhancing lesion in the anterior right temporal lobe. Failed Copaxone, Aubagio, now on Ocrevus infusions 4/19/19, 5/3/19, 11/2019. Last MRIs 3/21/19 were stable. -Next Ocrevus infusion in May, 2020.   -MRI brain and C-spine without contrast 3/2020  -CMP, CBC with diff, TSH to monitor for toxicity  -Monitor ongoing infections and f/u lymphocyte count. May need to reconsider tx with Keyana Huogh, though currently well controlled from an MS standpoint.   -Pt is getting appropriate routine cancer screenings    She returns for f/u. MRI brain and C-spine wo contrast 3/10/20 were stable. CMP, CBC with diff, and TSH 12/23/20 were normal.  No new sxs since last visit. She had her infusion of Ocrevus on 5/29/20 after much discussion with her insurance company. They are forcing her to use a new infusion center from here out, [facility] infusion. She had a rash on her face, a contact dermatitis most likely, ruled out lupus once again. Previous imaging/DMTs:  MRI of C and T spine with contrast on 4/15/17 with abnormal signal with enhancement and possible subtle mass effect in the cord at C4.    MRI brain w/wo contrast 4/17/17 with multiple T2/Flair hyperintensities in the PV and subcortical WM, per radiology <9 lesions, with enhancing lesion in the anterior right temporal lobe. Started Copaxone 6/30/17,   Had exacerbation 2/6/18, MRI brain 2/6/18 with multiple new enhancing lesions   Had another exacerbation 3/12/18 with MRI brain 3/13/18 with significant progression of lesions with multiple enhancing lesions since MRI brain one month prior   MRI C-spine 3/26/18 with improvement in hyperintensity and cord expansion at C4. Had another exacerbation 4/6/18, treated with Acthar x 5 days. Switched to Elmer Bull 4/24/18 after her insurance company denied treatment with Tysabri. Had progression of disease on MRI brain w/wo contrast 3/21/19 with several new enhancing lesions. MRI C-spine w/wo contrast 3/21/19 was stable without new or enhancing lesions seen. Started [facility] 4/19/19  MRI brain and C-spine wo contrast 3/10/20 were stable. Past Medical History:   Diagnosis Date    Encounter for long-term (current) use of high-risk medication     Multiple sclerosis ([facility] [address]     LP on 4/18/17 with normal OP 21mmHg, CSF studies were normal except +OGB and elevated IgG Index.     Multiple sclerosis (HCC)       Past Surgical History:   Procedure Laterality Date    HX ROTATOR CUFF REPAIR Right 1999      Social History     Socioeconomic History    Marital status:      Spouse name: Not on file    Number of children: Not on file    Years of education: Not on file    Highest education level: Not on file   Occupational History    Occupation:  at [address] [place] [address] Drive Financial resource strain: Not on file    Food insecurity     Worry: Not on file     Inability: Not on file   [employer] needs     Medical: Not on file     Non-medical: Not on file   Tobacco Use    Smoking status: Never Smoker    Smokeless tobacco: Never Used    Tobacco comment: smokes socially   Substance and Sexual Activity    Alcohol use: Yes     Alcohol/week: 2.0 - 3.0 standard drinks     Types: 2 - 3 Standard drinks or equivalent per week    Drug use: No    Sexual activity: Not on file   Lifestyle    Physical activity     Days per week: Not on file     Minutes per session: Not on file    Stress: Not on file   Relationships    Social connections     Talks on phone: Not on file     Gets together: Not on file     Attends Gnosticist service: Not on file     Active member of club or organization: Not on file     Attends meetings of clubs or organizations: Not on file     Relationship status: Not on file    Intimate partner violence     Fear of current or ex partner: Not on file     Emotionally abused: Not on file     Physically abused: Not on file     Forced sexual activity: Not on file   Other Topics Concern     Service Not Asked    Blood Transfusions Not Asked    Caffeine Concern Not Asked    Occupational Exposure Not Asked   Halle Mallika Hazards Not Asked    Sleep Concern Not Asked    Stress Concern Not Asked    Weight Concern Not Asked    Special Diet Not Asked    Back Care Not Asked    Exercise Not Asked    Bike Helmet Not Asked   [address] Not Asked    Self-Exams Not Asked   Social History Narrative    Lives in [place] with      Family History   Problem Relation Age of Onset    MS Mother         Dec 50yo    Other Father         Estranged    No Known Problems Sister     No Known Problems Brother     Other Brother         Hep C         Objective:   Review of Systems : Per HPI, o/w neg    No Known Allergies     Meds:  Outpatient Medications Prior to Visit   Medication Sig Dispense Refill    ethynodiol-ethinyl estradiol (Luis Carlos Maverick 1/35, 28,) 1-35 mg-mcg tab Take  by mouth.  ocrelizumab (Ocrevus) 30 mg/mL soln injection 1 mL by IntraVENous route every 6 months.  1 mL 2    predniSONE (DELTASONE) 10 mg tablet Take 6 tabs orally x 2 days, then 4 tabs daily x 2 days, then 2 tabs daily x 2 days, then 1 tab daily x 2 days, then 1/2 tab daily x 2 days 27 Tab 0    guaiFENesin ER (MUCINEX) 600 mg ER tablet Take 600 mg by mouth two (2) times a day.  cyanocobalamin (VITAMIN B-12) 1,000 mcg tablet Take 1,000 mcg by mouth daily.  DOCOSAHEXANOIC ACID/EPA (FISH OIL PO) Take  by mouth.  multivitamin (ONE A DAY) tablet Take 1 Tab by mouth daily.  sour cherry extract 1,000 mg cap Take 1 Cap by mouth daily.  cholecalciferol (VITAMIN D3) 1,000 unit cap Take 1,000 Units by mouth daily.  TURMERIC ROOT EXTRACT PO Take 1 Cap by mouth daily.  norgestimate-ethinyl estradiol (SPRINTEC, 28,) 0.25-35 mg-mcg tab Take 1 Tab by mouth daily. No facility-administered medications prior to visit. Imaging:  MRI Results (most recent):  Results from [place] encounter on 03/10/20   MRI CERV SPINE WO CONT    Narrative EXAM: MRI CERV SPINE WO CONT    INDICATION: Multiple sclerosis on Ocrevus, assess for progression of disease or  other abnormality. COMPARISON: MRI cervical spine on 3/21/2019 and 3/26/2018. TECHNIQUE: MR imaging of the cervical spine was performed using the following  sequences: sagittal T1, T2, STIR;  axial T2, T1.     CONTRAST:  None. FINDINGS:    There is normal alignment of the cervical spine. Vertebral body heights are  maintained. Marrow signal is normal. Discs are within normal limits. The craniocervical junction is intact. Hyperintense T2 signal in the dorsal  aspect of the cord at C4 is unchanged. No new cord lesion. Hyperintense T2 lesions in the cerebellum and criss are unchanged. Pineal cyst is  better imaged. The paraspinal soft tissues are within normal limits. No herniation or stenosis. Impression IMPRESSION:  Unchanged chronic demyelination in the dorsal spinal cord at C4. No new spinal  cord lesion.       CT Results (most recent):  Results from Hospital Encounter encounter on 04/09/17   CT HEAD WO CONT    Narrative EXAM:  CT HEAD WO CONT    INDICATION:   Head trauma, closed, mild, GCS >= 13, no risk factors, neuro exam  normal    COMPARISON: None. TECHNIQUE: Unenhanced CT of the head was performed using 5 mm images. Brain and  bone windows were generated. CT dose reduction was achieved through use of a  standardized protocol tailored for this examination and automatic exposure  control for dose modulation. FINDINGS:  There is no extra-axial fluid collection hemorrhage shift or masses her. Ventricles are normal in size and midline. Impression  impression: No acute changes. Reviewed records in [facility] [employer] tab today    Lab Review   Results for orders placed or performed in visit on 35/33/49   METABOLIC PANEL, COMPREHENSIVE   Result Value Ref Range    Glucose 82 65 - 99 mg/dL    BUN 8 6 - 20 mg/dL    Creatinine 0.65 0.57 - 1.00 mg/dL    GFR est non- >59 mL/min/1.73    GFR est  >59 mL/min/1.73    BUN/Creatinine ratio 12 9 - 23    Sodium 141 134 - 144 mmol/L    Potassium 4.4 3.5 - 5.2 mmol/L    Chloride 104 96 - 106 mmol/L    CO2 21 20 - 29 mmol/L    Calcium 8.8 8.7 - 10.2 mg/dL    Protein, total 6.2 6.0 - 8.5 g/dL    Albumin 4.2 3.5 - 5.5 g/dL    GLOBULIN, TOTAL 2.0 1.5 - 4.5 g/dL    A-G Ratio 2.1 1.2 - 2.2    Bilirubin, total <0.2 0.0 - 1.2 mg/dL    Alk. phosphatase 31 (L) 39 - 117 IU/L    AST (SGOT) 17 0 - 40 IU/L    ALT (SGPT) 20 0 - 32 IU/L   CBC WITH AUTOMATED DIFF   Result Value Ref Range    WBC 8.4 3.4 - 10.8 x10E3/uL    RBC 4.34 3.77 - 5.28 x10E6/uL    HGB 13.2 11.1 - 15.9 g/dL    HCT 37.5 34.0 - 46.6 %    MCV 86 79 - 97 fL    MCH 30.4 26.6 - 33.0 pg    MCHC 35.2 31.5 - 35.7 g/dL    RDW 11.7 (L) 12.3 - 15.4 %    PLATELET 272 616 - 566 x10E3/uL    NEUTROPHILS 73 Not Estab. %    Lymphocytes 18 Not Estab. %    MONOCYTES 7 Not Estab. %    EOSINOPHILS 1 Not Estab. %    BASOPHILS 0 Not Estab. %    ABS.  NEUTROPHILS 6.1 1.4 - 7.0 x10E3/uL    Abs Lymphocytes 1.5 0.7 - 3.1 x10E3/uL ABS. MONOCYTES 0.6 0.1 - 0.9 x10E3/uL    ABS. EOSINOPHILS 0.1 0.0 - 0.4 x10E3/uL    ABS. BASOPHILS 0.0 0.0 - 0.2 x10E3/uL    IMMATURE GRANULOCYTES 1 Not Estab. %    ABS. IMM. GRANS. 0.1 0.0 - 0.1 x10E3/uL   TSH 3RD GENERATION   Result Value Ref Range    TSH 1.170 0.450 - 4.500 uIU/mL        Exam:  Visit Vitals  /78   Pulse 88   Resp 16   Ht 5' 3\" (1.6 m)   Wt 81.6 kg (180 lb)   SpO2 97%   BMI 31.89 kg/m²     General:  Alert, cooperative, no distress. Head:  Normocephalic, without obvious abnormality, atraumatic. Respiratory:  Heart:   Non labored breathing  Regular rate and rhythm, no murmurs   Neck:      Extremities: Warm, no cyanosis or edema. Pulses: 2+ radial pulses. Neurologic:  MS: Alert and oriented x 4, speech intact. Language intact. Attention and fund of knowledge appropriate. Recent and remote memory intact. Cranial Nerves:  II: visual fields VFF   II: pupils Equal, round, reactive to light   II: optic disc    III,VII: ptosis none   III,IV,VI: extraocular muscles  EOMI, no nystagmus or diplopia   V: facial light touch sensation     VII: facial muscle function   symmetric   VIII: hearing intact   IX: soft palate elevation     XI: trapezius strength     XI: sternocleidomastoid strength    XII: tongue       Motor: normal bulk and tone, no tremor              Strength: 5/5 throughout, no PD  Sensory:  Coordination: FTN, HTS, and MIL intact  Gait: normal gait, able to tandem walk  Reflexes: 2+ symmetric       Assessment/Plan   Pt is a 38 y.o. RH female with RRMS, diagnosed after presenting with sudden onset of sensory changes in April, 2017,  tingling in bottom of feet that moved up her legs with walking, hands bilaterally in 4th and 5th digits, moving across to other fingers, tingling from bra line down to just above her pubic bone, around to sides and area felt tight. MRI brain, C, T spine in April, 2017 c/w MS.  Failed Copaxone, Aubagio, now on Ocrevus infusions 4/19/19, 5/3/19, 11/2019, 5/2020. Last MRI brain and C-spine 3/10/20 were stable. Pt is doing very well from an MS standpoint since starting on Ocrevus. -Next Ocrevus infusion in Nov/Dec 2020.   -MRI brain and C-spine without contrast 3/2021  -CMP, CBC with diff to monitor for toxicity  -Monitor ongoing infections and f/u lymphocyte count.    -Pt is getting appropriate routine cancer screenings  -F/u in clinic in 6 months, instructed to call in the interim if needed. ICD-10-CM ICD-9-CM    1. [facility] L50 012 METABOLIC PANEL, COMPREHENSIVE      CBC WITH AUTOMATED DIFF   2. Encounter for long-term current use of high risk medication F33.809 J00.80 METABOLIC PANEL, COMPREHENSIVE      CBC WITH AUTOMATED DIFF       Signed:   Deja Cline MD  6/9/2020

## 2020-06-09 NOTE — LETTER
6/9/20 Patient: Gilda Mariee YOB: 1985 Date of Visit: 6/9/2020 Giovanny Sotelo MD 
[address] [address] VIA Facsimile: [phone] Dear Giovanny Sotelo MD, Thank you for referring Ms. Gilda Mariee to [address] [place] [address] for evaluation. My notes for this consultation are attached. If you have questions, please do not hesitate to call me. I look forward to following your patient along with you. Sincerely, Arlyn Garcia MD

## 2020-06-10 ENCOUNTER — TELEPHONE (OUTPATIENT)
Dept: NEUROLOGY | Age: 35
End: 2020-06-10

## 2020-06-10 LAB
ALBUMIN SERPL-MCNC: 4.2 G/DL (ref 3.8–4.8)
ALBUMIN/GLOB SERPL: 1.7 {RATIO} (ref 1.2–2.2)
ALP SERPL-CCNC: 29 IU/L (ref 39–117)
ALT SERPL-CCNC: 12 IU/L (ref 0–32)
AST SERPL-CCNC: 23 IU/L (ref 0–40)
BASOPHILS # BLD AUTO: 0 X10E3/UL (ref 0–0.2)
BASOPHILS NFR BLD AUTO: 0 %
BILIRUB SERPL-MCNC: <0.2 MG/DL (ref 0–1.2)
BUN SERPL-MCNC: 10 MG/DL (ref 6–20)
BUN/CREAT SERPL: 14 (ref 9–23)
CALCIUM SERPL-MCNC: 9.3 MG/DL (ref 8.7–10.2)
CHLORIDE SERPL-SCNC: 104 MMOL/L (ref 96–106)
CO2 SERPL-SCNC: 18 MMOL/L (ref 20–29)
CREAT SERPL-MCNC: 0.74 MG/DL (ref 0.57–1)
EOSINOPHIL # BLD AUTO: 0.1 X10E3/UL (ref 0–0.4)
EOSINOPHIL NFR BLD AUTO: 1 %
ERYTHROCYTE [DISTWIDTH] IN BLOOD BY AUTOMATED COUNT: 11.9 % (ref 11.7–15.4)
GLOBULIN SER CALC-MCNC: 2.5 G/DL (ref 1.5–4.5)
GLUCOSE SERPL-MCNC: 85 MG/DL (ref 65–99)
HCT VFR BLD AUTO: 39 % (ref 34–46.6)
HGB BLD-MCNC: 12.9 G/DL (ref 11.1–15.9)
IMM GRANULOCYTES # BLD AUTO: 0.1 X10E3/UL (ref 0–0.1)
IMM GRANULOCYTES NFR BLD AUTO: 1 %
LYMPHOCYTES # BLD AUTO: 2.1 X10E3/UL (ref 0.7–3.1)
LYMPHOCYTES NFR BLD AUTO: 20 %
MCH RBC QN AUTO: 30.2 PG (ref 26.6–33)
MCHC RBC AUTO-ENTMCNC: 33.1 G/DL (ref 31.5–35.7)
MCV RBC AUTO: 91 FL (ref 79–97)
MONOCYTES # BLD AUTO: 0.8 X10E3/UL (ref 0.1–0.9)
MONOCYTES NFR BLD AUTO: 8 %
NEUTROPHILS # BLD AUTO: 7.6 X10E3/UL (ref 1.4–7)
NEUTROPHILS NFR BLD AUTO: 70 %
PLATELET # BLD AUTO: 240 X10E3/UL (ref 150–450)
POTASSIUM SERPL-SCNC: 5 MMOL/L (ref 3.5–5.2)
PROT SERPL-MCNC: 6.7 G/DL (ref 6–8.5)
RBC # BLD AUTO: 4.27 X10E6/UL (ref 3.77–5.28)
SODIUM SERPL-SCNC: 139 MMOL/L (ref 134–144)
WBC # BLD AUTO: 10.7 X10E3/UL (ref 3.4–10.8)

## 2020-10-08 ENCOUNTER — TELEPHONE (OUTPATIENT)
Dept: NEUROLOGY | Facility: CLINIC | Age: 35
End: 2020-10-08

## 2020-10-08 NOTE — TELEPHONE ENCOUNTER
Infusion center calling needing pt's last office note and Ocrevus enrollment form. Please fax.  Fax: [phone]

## 2020-10-12 NOTE — TELEPHONE ENCOUNTER
Spoke with patient and informed her of the reason for the call. Advised will mail flyer of MS event to her address on file. Patient was given an opportunity to ask questions, repeated information, and verbalized understanding. [FreeTextEntry2] : MIKAEL DAMON\par  [FreeTextEntry1] : \par \par Dear  Dr. MIKAEL DAMON,\par \par I had the pleasure of seeing your patient today.  \par Please see my note below.\par \par \par Thank you very much for allowing me to participate in the care of your patient.\par \par Sincerely,\par \par \par Sidney Ley MD\par [facility]\par [facility]\par  [facility]\par \par

## 2020-11-24 RX ORDER — SODIUM CHLORIDE 9 MG/ML
25 INJECTION, SOLUTION INTRAVENOUS CONTINUOUS
Status: DISCONTINUED | OUTPATIENT
Start: 2020-11-30 | End: 2020-12-01 | Stop reason: HOSPADM

## 2020-11-24 RX ORDER — DIPHENHYDRAMINE HYDROCHLORIDE 50 MG/ML
50 INJECTION, SOLUTION INTRAMUSCULAR; INTRAVENOUS
Status: DISCONTINUED | OUTPATIENT
Start: 2020-11-30 | End: 2020-12-01 | Stop reason: HOSPADM

## 2020-11-24 RX ORDER — ACETAMINOPHEN 325 MG/1
650 TABLET ORAL
Status: DISCONTINUED | OUTPATIENT
Start: 2020-11-30 | End: 2020-12-01 | Stop reason: HOSPADM

## 2020-11-24 RX ORDER — ACETAMINOPHEN 325 MG/1
650 TABLET ORAL ONCE
Status: ACTIVE | OUTPATIENT
Start: 2020-11-30 | End: 2020-11-30

## 2020-11-24 RX ORDER — DIPHENHYDRAMINE HCL 25 MG
50 CAPSULE ORAL ONCE
Status: ACTIVE | OUTPATIENT
Start: 2020-11-30 | End: 2020-11-30

## 2020-11-30 ENCOUNTER — HOSPITAL ENCOUNTER (OUTPATIENT)
Dept: INFUSION THERAPY | Age: 35
Discharge: HOME | End: 2020-11-30

## 2020-12-11 ENCOUNTER — TELEPHONE (OUTPATIENT)
Dept: NEUROLOGY | Age: 35
End: 2020-12-11

## 2020-12-11 DIAGNOSIS — G35 MS (MULTIPLE SCLEROSIS) (HCC): Primary | ICD-10-CM

## 2020-12-11 DIAGNOSIS — Z79.899 ENCOUNTER FOR LONG-TERM CURRENT USE OF HIGH RISK MEDICATION: ICD-10-CM

## 2020-12-11 NOTE — TELEPHONE ENCOUNTER
Pt has an upcoming appt on 12/22 and she is wondering if she needs blood work before. She is also wondering if it can be changed to a VV. Please advise.

## 2020-12-14 ENCOUNTER — TELEPHONE (OUTPATIENT)
Dept: NEUROLOGY | Age: 35
End: 2020-12-14

## 2020-12-14 DIAGNOSIS — G35 MS (MULTIPLE SCLEROSIS) (HCC): ICD-10-CM

## 2020-12-14 DIAGNOSIS — Z79.899 ENCOUNTER FOR LONG-TERM CURRENT USE OF HIGH RISK MEDICATION: ICD-10-CM

## 2020-12-14 NOTE — TELEPHONE ENCOUNTER
Pt would like to use [employer] on     [address]      Fax: [phone]

## 2020-12-14 NOTE — TELEPHONE ENCOUNTER
Left message for patient that labs were ordered, to come by office and pick them up. And her appointment can be a virtual. Any questions to call back.

## 2020-12-17 NOTE — TELEPHONE ENCOUNTER
Re: Karen MOON submitted via CMM to [employer]. Pending status:  Kinga Burnett (Kevin: June Manuel)   Need help? Call us at [phone]   N/A   Next Steps   The plan will fax you a determination, typically within 1 to 5 business days. \"      Will update when determination is made. no edema, no murmurs, regular rate and rhythm

## 2020-12-19 LAB
ALBUMIN SERPL-MCNC: 4.5 G/DL (ref 3.8–4.8)
ALBUMIN/GLOB SERPL: 2 {RATIO} (ref 1.2–2.2)
ALP SERPL-CCNC: 36 IU/L (ref 39–117)
ALT SERPL-CCNC: 27 IU/L (ref 0–32)
AST SERPL-CCNC: 21 IU/L (ref 0–40)
BASOPHILS # BLD AUTO: 0 X10E3/UL (ref 0–0.2)
BASOPHILS NFR BLD AUTO: 0 %
BILIRUB SERPL-MCNC: 0.5 MG/DL (ref 0–1.2)
BUN SERPL-MCNC: 12 MG/DL (ref 6–20)
BUN/CREAT SERPL: 18 (ref 9–23)
CALCIUM SERPL-MCNC: 9.9 MG/DL (ref 8.7–10.2)
CHLORIDE SERPL-SCNC: 100 MMOL/L (ref 96–106)
CO2 SERPL-SCNC: 22 MMOL/L (ref 20–29)
CREAT SERPL-MCNC: 0.66 MG/DL (ref 0.57–1)
EOSINOPHIL # BLD AUTO: 0.1 X10E3/UL (ref 0–0.4)
EOSINOPHIL NFR BLD AUTO: 1 %
ERYTHROCYTE [DISTWIDTH] IN BLOOD BY AUTOMATED COUNT: 11.5 % (ref 11.7–15.4)
GLOBULIN SER CALC-MCNC: 2.2 G/DL (ref 1.5–4.5)
GLUCOSE SERPL-MCNC: 87 MG/DL (ref 65–99)
HCT VFR BLD AUTO: 41.1 % (ref 34–46.6)
HGB BLD-MCNC: 13.8 G/DL (ref 11.1–15.9)
IMM GRANULOCYTES # BLD AUTO: 0 X10E3/UL (ref 0–0.1)
IMM GRANULOCYTES NFR BLD AUTO: 0 %
LYMPHOCYTES # BLD AUTO: 1.8 X10E3/UL (ref 0.7–3.1)
LYMPHOCYTES NFR BLD AUTO: 17 %
MCH RBC QN AUTO: 30.7 PG (ref 26.6–33)
MCHC RBC AUTO-ENTMCNC: 33.6 G/DL (ref 31.5–35.7)
MCV RBC AUTO: 92 FL (ref 79–97)
MONOCYTES # BLD AUTO: 0.6 X10E3/UL (ref 0.1–0.9)
MONOCYTES NFR BLD AUTO: 5 %
NEUTROPHILS # BLD AUTO: 8.5 X10E3/UL (ref 1.4–7)
NEUTROPHILS NFR BLD AUTO: 77 %
PLATELET # BLD AUTO: 246 X10E3/UL (ref 150–450)
POTASSIUM SERPL-SCNC: 4.2 MMOL/L (ref 3.5–5.2)
PROT SERPL-MCNC: 6.7 G/DL (ref 6–8.5)
RBC # BLD AUTO: 4.49 X10E6/UL (ref 3.77–5.28)
SODIUM SERPL-SCNC: 136 MMOL/L (ref 134–144)
TSH SERPL DL<=0.005 MIU/L-ACNC: 1.02 UIU/ML (ref 0.45–4.5)
WBC # BLD AUTO: 11 X10E3/UL (ref 3.4–10.8)

## 2020-12-22 ENCOUNTER — VIRTUAL VISIT (OUTPATIENT)
Dept: NEUROLOGY | Age: 35
End: 2020-12-22
Payer: COMMERCIAL

## 2020-12-22 DIAGNOSIS — G35 MS (MULTIPLE SCLEROSIS) (HCC): Primary | ICD-10-CM

## 2020-12-22 DIAGNOSIS — Z79.899 ENCOUNTER FOR LONG-TERM CURRENT USE OF HIGH RISK MEDICATION: ICD-10-CM

## 2020-12-22 PROCEDURE — 99214 OFFICE O/P EST MOD 30 MIN: CPT | Performed by: PSYCHIATRY & NEUROLOGY

## 2020-12-22 NOTE — PATIENT INSTRUCTIONS
PRESCRIPTION REFILL POLICY [facility] Statement to Patients April 1, 2014 In an effort to ensure the large volume of patient prescription refills is processed in the most efficient and expeditious manner, we are asking our patients to assist us by calling your Pharmacy for all prescription refills, this will include also your  Mail Order Pharmacy. The pharmacy will contact our office electronically to continue the refill process. Please do not wait until the last minute to call your pharmacy. We need at least 48 hours (2days) to fill prescriptions. We also encourage you to call your pharmacy before going to  your prescription to make sure it is ready. With regard to controlled substance prescription refill requests (narcotic refills) that need to be picked up at our office, we ask your cooperation by providing us with at least 72 hours (3days) notice that you will need a refill. We will not refill narcotic prescription refill requests after 4:00pm on any weekday, Monday through Thursday, or after 2:00pm on Fridays, or on the weekends. We encourage everyone to explore another way of getting your prescription refill request processed using ePatientFinder, our patient web portal through our electronic medical record system. ePatientFinder is an efficient and effective way to communicate your medication request directly to the office and  downloadable as an darrin on your smart phone . ePatientFinder also features a review functionality that allows you to view your medication list as well as leave messages for your physician. Are you ready to get connected? If so please review the attatched instructions or speak to any of our staff to get you set up right away! Thank you so much for your cooperation. Should you have any questions please contact our Practice Administrator. The Physicians and Staff,  [facility]

## 2020-12-22 NOTE — PROGRESS NOTES
Neurology Progress Note      HISTORY PROVIDED BY: patient  Kathryn Kramer is a 38 y.o. female who was seen by synchronous (real-time) audio-video technology on 12/22/2020. Two factor identification completed. Consent:  She and/or her healthcare decision maker is aware that this patient-initiated Telehealth encounter is a billable service, with coverage as determined by her insurance carrier. She is aware that she may receive a bill and has provided verbal consent to proceed: Yes    I was in the office while conducting this encounter. I discussed with the patient the nature of our telemedicine visit: Our sessions are not being recorded and personal health information is protected. We will provide follow up care in person when the patient needs it. Pursuant to the emergency declaration under the [address] [place] [address], [facility] waiver authority and the [employer] Act, this Virtual  Visit was conducted, with patient's consent, to reduce the patient's risk of exposure to COVID-19 and provide continuity of care for an established patient. Chief Complaint:   Chief Complaint   Patient presents with    Multiple Sclerosis      Subjective:   Pt is a 38 y.o. RH female last seen on 6/9/20 in f/u for  RRMS, diagnosed after presenting with sudden onset of sensory changes in April, 2017,  tingling in bottom of feet that moved up her legs with walking, hands bilaterally in 4th and 5th digits, moving across to other fingers, tingling from bra line down to just above her pubic bone, around to sides and area felt tight. MRI brain, C, T spine in April, 2017 c/w MS. Failed Copaxone, Aubagio, now on Ocrevus infusions 4/19/19, 5/3/19, 11/2019, 5/2020. Last MRI brain and C-spine 3/10/20 were stable. Pt is doing very well from an MS standpoint since starting on Ocrevus.   -Next Ocrevus infusion in Nov/Dec 2020.   -MRI brain and C-spine without contrast 3/2021  -CMP, CBC with diff to monitor for toxicity  -Monitor ongoing infections and f/u lymphocyte count.    -Pt is getting appropriate routine cancer screenings    She returns for virtual f/u visit. No infections, no recurrent dermatitis, is up to date on cancer screenings. Screening labs on 12/18/20 - TSH, CMP, CBC with diff - all unremarkable. No new neurological sxs suggestive of an exacerbation. Just had Ocrevus infusion on 12/3/20. She is staying inside and away from people during the Pandemic. She asked about COVID-19 vaccine. Previous imaging/DMTs:  MRI of C and T spine with contrast on 4/15/17 with abnormal signal with enhancement and possible subtle mass effect in the cord at C4. MRI brain w/wo contrast 4/17/17 with multiple T2/Flair hyperintensities in the PV and subcortical WM, per radiology <9 lesions, with enhancing lesion in the anterior right temporal lobe. Started Copaxone 6/30/17,   Had exacerbation 2/6/18, MRI brain 2/6/18 with multiple new enhancing lesions   Had another exacerbation 3/12/18 with MRI brain 3/13/18 with significant progression of lesions with multiple enhancing lesions since MRI brain one month prior   MRI C-spine 3/26/18 with improvement in hyperintensity and cord expansion at C4. Had another exacerbation 4/6/18, treated with Acthar x 5 days. Switched to [employer] 4/24/18 after her insurance company denied treatment with Tysabri. Had progression of disease on MRI brain w/wo contrast 3/21/19 with several new enhancing lesions. MRI C-spine w/wo contrast 3/21/19 was stable without new or enhancing lesions seen. Started Earnest Leep 4/19/19  MRI brain and C-spine wo contrast 3/10/20 were stable. Past Medical History:   Diagnosis Date    Encounter for long-term (current) use of high-risk medication     Multiple sclerosis ([address]     LP on 4/18/17 with normal OP 21mmHg, CSF studies were normal except +OGB and elevated IgG Index.     Multiple sclerosis (HCC)       Past Surgical History:   Procedure Laterality Date    HX ROTATOR CUFF REPAIR Right 1999      Social History     Socioeconomic History    Marital status:      Spouse name: Not on file    Number of children: Not on file    Years of education: Not on file    Highest education level: Not on file   Occupational History    Occupation:  at [address] [place] [address] Drive Financial resource strain: Not on file    Food insecurity     Worry: Not on file     Inability: Not on file   [employer] needs     Medical: Not on file     Non-medical: Not on file   Tobacco Use    Smoking status: Never Smoker    Smokeless tobacco: Never Used    Tobacco comment: smokes socially   Substance and Sexual Activity    Alcohol use:  Yes     Alcohol/week: 2.0 - 3.0 standard drinks     Types: 2 - 3 Standard drinks or equivalent per week    Drug use: No    Sexual activity: Not on file   Lifestyle    Physical activity     Days per week: Not on file     Minutes per session: Not on file    Stress: Not on file   Relationships    Social connections     Talks on phone: Not on file     Gets together: Not on file     Attends Jewish service: Not on file     Active member of club or organization: Not on file     Attends meetings of clubs or organizations: Not on file     Relationship status: Not on file    Intimate partner violence     Fear of current or ex partner: Not on file     Emotionally abused: Not on file     Physically abused: Not on file     Forced sexual activity: Not on file   Other Topics Concern     Service Not Asked    Blood Transfusions Not Asked    Caffeine Concern Not Asked    Occupational Exposure Not Asked   Boneta Matheny Hazards Not Asked    Sleep Concern Not Asked    Stress Concern Not Asked    Weight Concern Not Asked    Special Diet Not Asked    Back Care Not Asked    Exercise Not Asked    Bike Helmet Not Asked   [address] Not Asked    Self-Exams Not Asked   Social History Narrative    Lives in [place] with      Family History   Problem Relation Age of Onset    MS Mother         Dec 50yo    Other Father         Estranged    No Known Problems Sister     No Known Problems Brother     Other Brother         Hep C         Objective:   Review of Systems : Per HPI, o/w neg    No Known Allergies     Meds:  Outpatient Medications Prior to Visit   Medication Sig Dispense Refill    ethynodiol-ethinyl estradiol (Vernell Carwin 1/35, 28,) 1-35 mg-mcg tab Take  by mouth.  ocrelizumab (Ocrevus) 30 mg/mL soln injection 1 mL by IntraVENous route every 6 months. 1 mL 2    cyanocobalamin (VITAMIN B-12) 1,000 mcg tablet Take 1,000 mcg by mouth daily.  multivitamin (ONE A DAY) tablet Take 1 Tab by mouth daily.  cholecalciferol (VITAMIN D3) 1,000 unit cap Take 1,000 Units by mouth daily.  predniSONE (DELTASONE) 10 mg tablet Take 6 tabs orally x 2 days, then 4 tabs daily x 2 days, then 2 tabs daily x 2 days, then 1 tab daily x 2 days, then 1/2 tab daily x 2 days 27 Tab 0    guaiFENesin ER (MUCINEX) 600 mg ER tablet Take 600 mg by mouth two (2) times a day.  DOCOSAHEXANOIC ACID/EPA (FISH OIL PO) Take  by mouth.  sour cherry extract 1,000 mg cap Take 1 Cap by mouth daily.  TURMERIC ROOT EXTRACT PO Take 1 Cap by mouth daily.  norgestimate-ethinyl estradiol (SPRINTEC, 28,) 0.25-35 mg-mcg tab Take 1 Tab by mouth daily. No facility-administered medications prior to visit. Imaging:  MRI Results (most recent):  Results from [place] encounter on 03/10/20   MRI CERV SPINE WO CONT    Narrative EXAM: MRI CERV SPINE WO CONT    INDICATION: Multiple sclerosis on Ocrevus, assess for progression of disease or  other abnormality. COMPARISON: MRI cervical spine on 3/21/2019 and 3/26/2018.     TECHNIQUE: MR imaging of the cervical spine was performed using the following  sequences: sagittal T1, T2, STIR;  axial T2, T1.     CONTRAST: None.    FINDINGS:    There is normal alignment of the cervical spine. Vertebral body heights are  maintained. Marrow signal is normal. Discs are within normal limits. The craniocervical junction is intact. Hyperintense T2 signal in the dorsal  aspect of the cord at C4 is unchanged. No new cord lesion. Hyperintense T2 lesions in the cerebellum and criss are unchanged. Pineal cyst is  better imaged. The paraspinal soft tissues are within normal limits. No herniation or stenosis. Impression IMPRESSION:  Unchanged chronic demyelination in the dorsal spinal cord at C4. No new spinal  cord lesion. CT Results (most recent):  Results from Hospital Encounter encounter on 04/09/17   CT HEAD WO CONT    Narrative EXAM:  CT HEAD WO CONT    INDICATION:   Head trauma, closed, mild, GCS >= 13, no risk factors, neuro exam  normal    COMPARISON: None. TECHNIQUE: Unenhanced CT of the head was performed using 5 mm images. Brain and  bone windows were generated. CT dose reduction was achieved through use of a  standardized protocol tailored for this examination and automatic exposure  control for dose modulation. FINDINGS:  There is no extra-axial fluid collection hemorrhage shift or masses her. Ventricles are normal in size and midline. Impression  impression: No acute changes.             Reviewed records in [email or web address] tab today    Lab Review   Results for orders placed or performed in visit on 77/54/74   METABOLIC PANEL, COMPREHENSIVE   Result Value Ref Range    Glucose 87 65 - 99 mg/dL    BUN 12 6 - 20 mg/dL    Creatinine 0.66 0.57 - 1.00 mg/dL    GFR est non- >59 mL/min/1.73    GFR est  >59 mL/min/1.73    BUN/Creatinine ratio 18 9 - 23    Sodium 136 134 - 144 mmol/L    Potassium 4.2 3.5 - 5.2 mmol/L    Chloride 100 96 - 106 mmol/L    CO2 22 20 - 29 mmol/L    Calcium 9.9 8.7 - 10.2 mg/dL    Protein, total 6.7 6.0 - 8.5 g/dL    Albumin 4.5 3.8 - 4.8 g/dL    GLOBULIN, TOTAL 2.2 1.5 - 4.5 g/dL    A-G Ratio 2.0 1.2 - 2.2    Bilirubin, total 0.5 0.0 - 1.2 mg/dL    Alk. phosphatase 36 (L) 39 - 117 IU/L    AST (SGOT) 21 0 - 40 IU/L    ALT (SGPT) 27 0 - 32 IU/L   CBC WITH AUTOMATED DIFF   Result Value Ref Range    WBC 11.0 (H) 3.4 - 10.8 x10E3/uL    RBC 4.49 3.77 - 5.28 x10E6/uL    HGB 13.8 11.1 - 15.9 g/dL    HCT 41.1 34.0 - 46.6 %    MCV 92 79 - 97 fL    MCH 30.7 26.6 - 33.0 pg    MCHC 33.6 31.5 - 35.7 g/dL    RDW 11.5 (L) 11.7 - 15.4 %    PLATELET 206 674 - 463 x10E3/uL    NEUTROPHILS 77 Not Estab. %    Lymphocytes 17 Not Estab. %    MONOCYTES 5 Not Estab. %    EOSINOPHILS 1 Not Estab. %    BASOPHILS 0 Not Estab. %    ABS. NEUTROPHILS 8.5 (H) 1.4 - 7.0 x10E3/uL    Abs Lymphocytes 1.8 0.7 - 3.1 x10E3/uL    ABS. MONOCYTES 0.6 0.1 - 0.9 x10E3/uL    ABS. EOSINOPHILS 0.1 0.0 - 0.4 x10E3/uL    ABS. BASOPHILS 0.0 0.0 - 0.2 x10E3/uL    IMMATURE GRANULOCYTES 0 Not Estab. %    ABS. IMM. GRANS. 0.0 0.0 - 0.1 x10E3/uL   TSH 3RD GENERATION   Result Value Ref Range    TSH 1.020 0.450 - 4.500 uIU/mL      Exam limited due to VV, unable to take VS.  Exam:  There were no vitals taken for this visit. General:  Alert, cooperative, no distress. Head:  Normocephalic, without obvious abnormality, atraumatic. Respiratory:  Heart:   Non labored breathing     Neck:      Extremities:    Pulses:        Neurologic:  MS: Alert and oriented x 4, speech intact. Language intact. Attention and fund of knowledge appropriate. Recent and remote memory intact. Cranial Nerves:  II: visual fields    II: pupils    II: optic disc    III,VII: ptosis none   III,IV,VI: extraocular muscles  EOMI, no nystagmus or diplopia   V: facial light touch sensation     VII: facial muscle function   symmetric   VIII: hearing intact   IX: soft palate elevation     XI: trapezius strength     XI: sternocleidomastoid strength    XII: tongue     Exam 6/2020:   Motor: normal bulk and tone, no tremor              Strength: 5/5 throughout, no PD  Sensory:  Coordination: FTN, HTS, and MIL intact  Gait: normal gait, able to tandem walk  Reflexes: 2+ symmetric       Assessment/Plan   Pt is a 38 y.o. RH female with RRMS, diagnosed after presenting with sudden onset of sensory changes in April, 2017,  tingling in bottom of feet that moved up her legs with walking, hands bilaterally in 4th and 5th digits, moving across to other fingers, tingling from bra line down to just above her pubic bone, around to sides and area felt tight. MRI brain, C, T spine in April, 2017 c/w MS. Failed Copaxone, Aubagio, now on Ocrevus infusions 4/19/19, 5/3/19, 11/2019, 5/2020, 12/2020. Last MRI brain and C-spine 3/10/20 were stable. Exam is limited due to VV, no obvious changes. Pt is doing very well from an MS standpoint since starting on Ocrevus. -Next Ocrevus infusion in May/June 2021.   -MRI brain and C-spine without contrast 3/2021  -Monitor ongoing infections and f/u lymphocyte count.    -Pt is getting appropriate routine cancer screenings  -F/u in clinic in 6 months, instructed to call in the interim if needed. ICD-10-CM ICD-9-CM    1. MS (multiple sclerosis) ([facility] [email or web address] [facility]  G35 [id number]    2. Encounter for long-term current use of high risk medication  Z79.899 V58.69        Signed:   Fifi Mariee MD  12/22/2020

## 2021-01-08 ENCOUNTER — TELEPHONE (OUTPATIENT)
Dept: NEUROLOGY | Age: 36
End: 2021-01-08

## 2021-04-30 ENCOUNTER — TELEPHONE (OUTPATIENT)
Dept: NEUROLOGY | Age: 36
End: 2021-04-30

## 2021-04-30 NOTE — TELEPHONE ENCOUNTER
----- Message from [facility] sent at 4/30/2021  4:34 PM EDT -----  Regarding: /Telephone     Caller's first and last name:[employer]  Reason for call:infusion date cancelled due to insurance. Callback required yes/no and why:Yes  Best contact number(s):[phone] ext [phone]  Details to clarify the request: Raman Waters can help the patient find a new infusion site if the doctor gives an okay to do so. Pa is approved for an in office setting.

## 2021-05-03 NOTE — TELEPHONE ENCOUNTER
Spoke with Jack Lima at [facility]. She stated that the pt insurance [employer] covered the 1st infusion in the hospital but will no cover the 2nd. [facility] requires to go to a non hospital office. Jack Lima stated that pt has 2 PA one from Dr. Anupam Plascencia and the other from Dr. Deb Lynne. Jack Lima stated if Dr. Anupam Plascencia want to keep pt at [facility] then to try to submit a hospital outpatient PA but since pt already has a approve PA Jack Lima stated that she could find the pt a non hospital site that's near pt home if ok with Dr. Anupam Plascencia.

## 2021-05-04 ENCOUNTER — TELEPHONE (OUTPATIENT)
Dept: NEUROLOGY | Age: 36
End: 2021-05-04

## 2021-05-04 NOTE — TELEPHONE ENCOUNTER
----- Message from Halle Mishra sent at 5/4/2021  2:16 PM EDT -----  Regarding: [place]/Telephone  General Message/Vendor Calls    Caller's first and last name: Kinza Wayne with [employer]-Ocrevus       Reason for call: Navigation of the Reimbursement Process, she would like to come meet with you to help the process go smoothly. Callback required yes/no and why: yes       Best contact number(s): [phone]      Details to clarify the request: email is : [email or web address]      Janiya Ramon

## 2021-05-04 NOTE — TELEPHONE ENCOUNTER
Spoke w/ Anu Dewey at [facility] inform her that Dr. Sherin Decker that Anu Dewey could help find the pt a new infusion site.

## 2021-05-21 ENCOUNTER — TELEPHONE (OUTPATIENT)
Dept: NEUROLOGY | Age: 36
End: 2021-05-21

## 2021-05-21 NOTE — TELEPHONE ENCOUNTER
Patient calling to get an updated order/prescription for Ocrevus and last office notes faxed to [facility].   She has an appt on 6/10    [phone]

## 2021-05-24 RX ORDER — OCRELIZUMAB 300 MG/10ML
600 INJECTION INTRAVENOUS
Qty: 20 ML | Refills: 1 | Status: SHIPPED | OUTPATIENT
Start: 2021-05-24

## 2021-05-28 ENCOUNTER — APPOINTMENT (OUTPATIENT)
Dept: INFUSION THERAPY | Age: 36
End: 2021-05-28

## 2021-06-28 ENCOUNTER — OFFICE VISIT (OUTPATIENT)
Dept: NEUROLOGY | Age: 36
End: 2021-06-28
Payer: COMMERCIAL

## 2021-06-28 VITALS
SYSTOLIC BLOOD PRESSURE: 120 MMHG | HEIGHT: 64 IN | BODY MASS INDEX: 31.48 KG/M2 | HEART RATE: 79 BPM | OXYGEN SATURATION: 99 % | WEIGHT: 184.4 LBS | DIASTOLIC BLOOD PRESSURE: 80 MMHG

## 2021-06-28 DIAGNOSIS — Z79.899 ENCOUNTER FOR LONG-TERM CURRENT USE OF HIGH RISK MEDICATION: ICD-10-CM

## 2021-06-28 DIAGNOSIS — G35 MS (MULTIPLE SCLEROSIS) (HCC): Primary | ICD-10-CM

## 2021-06-28 PROCEDURE — 99214 OFFICE O/P EST MOD 30 MIN: CPT | Performed by: PSYCHIATRY & NEUROLOGY

## 2021-06-28 NOTE — PROGRESS NOTES
Neurology Progress Note      HISTORY PROVIDED BY: patient    Chief Complaint:   Chief Complaint   Patient presents with    Follow-up    Multiple Sclerosis      Subjective:   Pt is a 39 y.o. RH female last seen virtually on 12/22/20 with RRMS, diagnosed after presenting with sudden onset of sensory changes in April, 2017,  tingling in bottom of feet that moved up her legs with walking, hands bilaterally in 4th and 5th digits, moving across to other fingers, tingling from bra line down to just above her pubic bone, around to sides and area felt tight. MRI brain, C, T spine in April, 2017 c/w MS. Failed Copaxone, Aubagio, now on Ocrevus infusions every 6 months, first was 4/19/19, most recent 12/2020. Last MRI brain and C-spine 3/10/20 were stable. Exam was limited due to VV, no obvious changes. Pt was doing very well from an MS standpoint since starting on Ocrevus. -Next Ocrevus infusion in May/June 2021.   -MRI brain and C-spine without contrast 3/2021  -Monitor ongoing infections and f/u lymphocyte count.    -Pt getting appropriate routine cancer screenings    She returns for f/u. She is doing well. No new neurological deficits or complaints. Last infusion was 6/10/21. Previous imaging/DMTs:  MRI of C and T spine with contrast on 4/15/17 with abnormal signal with enhancement and possible subtle mass effect in the cord at C4. MRI brain w/wo contrast 4/17/17 with multiple T2/Flair hyperintensities in the PV and subcortical WM, per radiology <9 lesions, with enhancing lesion in the anterior right temporal lobe. Started Copaxone 6/30/17,   Had exacerbation 2/6/18, MRI brain 2/6/18 with multiple new enhancing lesions   Had another exacerbation 3/12/18 with MRI brain 3/13/18 with significant progression of lesions with multiple enhancing lesions since MRI brain one month prior   MRI C-spine 3/26/18 with improvement in hyperintensity and cord expansion at C4.    Had another exacerbation 4/6/18, treated with Acthar x 5 days. Switched to Quantivo 4/24/18 after her insurance company denied treatment with Tysabri. Had progression of disease on MRI brain w/wo contrast 3/21/19 with several new enhancing lesions. MRI C-spine w/wo contrast 3/21/19 was stable without new or enhancing lesions seen. Started Edd Baize 4/19/19  MRI brain and C-spine wo contrast 3/10/20 were stable. Past Medical History:   Diagnosis Date    Encounter for long-term (current) use of high-risk medication     Multiple sclerosis ([address]     LP on 4/18/17 with normal OP 21mmHg, CSF studies were normal except +OGB and elevated IgG Index.  Multiple sclerosis (HCC)       Past Surgical History:   Procedure Laterality Date    HX ROTATOR CUFF REPAIR Right 1999      Social History     Socioeconomic History    Marital status:      Spouse name: Not on file    Number of children: Not on file    Years of education: Not on file    Highest education level: Not on file   Occupational History    Occupation:  at [employer] Use    Smoking status: Never Smoker    Smokeless tobacco: Never Used    Tobacco comment: smokes socially   Substance and Sexual Activity    Alcohol use:  Yes     Alcohol/week: 2.0 - 3.0 standard drinks     Types: 2 - 3 Standard drinks or equivalent per week    Drug use: No    Sexual activity: Not on file   Other Topics Concern     Service Not Asked    Blood Transfusions Not Asked    Caffeine Concern Not Asked    Occupational Exposure Not Asked    Hobby Hazards Not Asked    Sleep Concern Not Asked    Stress Concern Not Asked    Weight Concern Not Asked    Special Diet Not Asked    Back Care Not Asked    Exercise Not Asked    Bike Helmet Not Asked   [address] Not Asked    Self-Exams Not Asked   Social History Narrative    Lives in [facility] with      Social Determinants of Health     Financial Resource Strain:     Difficulty of Paying Living Expenses: Food Insecurity:     Worried About Running Out of Food in the Last Year:     [address] in the Last Year:    Transportation Needs:     Lack of Transportation (Medical):  Lack of Transportation (Non-Medical):    Physical Activity:     Days of Exercise per Week:     Minutes of Exercise per Session:    Stress:     Feeling of Stress :    Social Connections:     Frequency of Communication with Friends and Family:     Frequency of Social Gatherings with Friends and Family:     Attends Hinduism Services:     Active Member of Clubs or Organizations:     Attends Club or Organization Meetings:     Marital Status:    Intimate Partner Violence:     Fear of Current or Ex-Partner:     Emotionally Abused:     Physically Abused:     Sexually Abused:      Family History   Problem Relation Age of Onset    MS Mother         Dec 50yo    Other Father         Estranged    No Known Problems Sister     No Known Problems Brother     Other Brother         Hep C         Objective:   Review of Systems : Per HPI, o/w neg    No Known Allergies     Meds:  Outpatient Medications Prior to Visit   Medication Sig Dispense Refill    ocrelizumab (Ocrevus) 30 mg/mL soln injection 20 mL by IntraVENous route every 6 months. 20 mL 1    ethynodiol-ethinyl estradiol (Nandini Florian 1/35, 28,) 1-35 mg-mcg tab Take  by mouth.  cyanocobalamin (VITAMIN B-12) 1,000 mcg tablet Take 1,000 mcg by mouth daily. (Patient not taking: Reported on 6/28/2021)      multivitamin (ONE A DAY) tablet Take 1 Tab by mouth daily. (Patient not taking: Reported on 6/28/2021)      cholecalciferol (VITAMIN D3) 1,000 unit cap Take 1,000 Units by mouth daily. (Patient not taking: Reported on 6/28/2021)       No facility-administered medications prior to visit.        Imaging:  MRI Results (most recent):  Results from [place] encounter on 03/10/20    MRI CERV SPINE WO CONT    Narrative  EXAM: MRI CERV SPINE WO CONT    INDICATION: Multiple sclerosis on Ocrevus, assess for progression of disease or  other abnormality. COMPARISON: MRI cervical spine on 3/21/2019 and 3/26/2018. TECHNIQUE: MR imaging of the cervical spine was performed using the following  sequences: sagittal T1, T2, STIR;  axial T2, T1.    CONTRAST:  None. FINDINGS:    There is normal alignment of the cervical spine. Vertebral body heights are  maintained. Marrow signal is normal. Discs are within normal limits. The craniocervical junction is intact. Hyperintense T2 signal in the dorsal  aspect of the cord at C4 is unchanged. No new cord lesion. Hyperintense T2 lesions in the cerebellum and criss are unchanged. Pineal cyst is  better imaged. The paraspinal soft tissues are within normal limits. No herniation or stenosis. Impression  IMPRESSION:  Unchanged chronic demyelination in the dorsal spinal cord at C4. No new spinal  cord lesion. CT Results (most recent):  Results from Hospital Encounter encounter on 04/09/17    CT HEAD WO CONT    Narrative  EXAM:  CT HEAD WO CONT    INDICATION:   Head trauma, closed, mild, GCS >= 13, no risk factors, neuro exam  normal    COMPARISON: None. TECHNIQUE: Unenhanced CT of the head was performed using 5 mm images. Brain and  bone windows were generated. CT dose reduction was achieved through use of a  standardized protocol tailored for this examination and automatic exposure  control for dose modulation. FINDINGS:  There is no extra-axial fluid collection hemorrhage shift or masses her. Ventricles are normal in size and midline. Impression  impression: No acute changes.        Reviewed records in [facility] and [facility] tab today    Lab Review   Results for orders placed or performed in visit on 75/17/03   METABOLIC PANEL, COMPREHENSIVE   Result Value Ref Range    Glucose 87 65 - 99 mg/dL    BUN 12 6 - 20 mg/dL    Creatinine 0.66 0.57 - 1.00 mg/dL    GFR est non- >59 mL/min/1.73    GFR est  >59 mL/min/1.73 BUN/Creatinine ratio 18 9 - 23    Sodium 136 134 - 144 mmol/L    Potassium 4.2 3.5 - 5.2 mmol/L    Chloride 100 96 - 106 mmol/L    CO2 22 20 - 29 mmol/L    Calcium 9.9 8.7 - 10.2 mg/dL    Protein, total 6.7 6.0 - 8.5 g/dL    Albumin 4.5 3.8 - 4.8 g/dL    GLOBULIN, TOTAL 2.2 1.5 - 4.5 g/dL    A-G Ratio 2.0 1.2 - 2.2    Bilirubin, total 0.5 0.0 - 1.2 mg/dL    Alk. phosphatase 36 (L) 39 - 117 IU/L    AST (SGOT) 21 0 - 40 IU/L    ALT (SGPT) 27 0 - 32 IU/L   CBC WITH AUTOMATED DIFF   Result Value Ref Range    WBC 11.0 (H) 3.4 - 10.8 x10E3/uL    RBC 4.49 3.77 - 5.28 x10E6/uL    HGB 13.8 11.1 - 15.9 g/dL    HCT 41.1 34.0 - 46.6 %    MCV 92 79 - 97 fL    MCH 30.7 26.6 - 33.0 pg    MCHC 33.6 31.5 - 35.7 g/dL    RDW 11.5 (L) 11.7 - 15.4 %    PLATELET 104 623 - 779 x10E3/uL    NEUTROPHILS 77 Not Estab. %    Lymphocytes 17 Not Estab. %    MONOCYTES 5 Not Estab. %    EOSINOPHILS 1 Not Estab. %    BASOPHILS 0 Not Estab. %    ABS. NEUTROPHILS 8.5 (H) 1.4 - 7.0 x10E3/uL    Abs Lymphocytes 1.8 0.7 - 3.1 x10E3/uL    ABS. MONOCYTES 0.6 0.1 - 0.9 x10E3/uL    ABS. EOSINOPHILS 0.1 0.0 - 0.4 x10E3/uL    ABS. BASOPHILS 0.0 0.0 - 0.2 x10E3/uL    IMMATURE GRANULOCYTES 0 Not Estab. %    ABS. IMM. GRANS. 0.0 0.0 - 0.1 x10E3/uL   TSH 3RD GENERATION   Result Value Ref Range    TSH 1.020 0.450 - 4.500 uIU/mL      Exam:  Visit Vitals  /80   Pulse 79   Ht 5' 4\" (1.626 m)   Wt 184 lb 6.4 oz (83.6 kg)   SpO2 99%   BMI 31.65 kg/m²     General:  Alert, cooperative, no distress. Head:  Normocephalic, without obvious abnormality, atraumatic. Respiratory:  Heart:   Non labored breathing  RRR, no murmurs   Neck:      Extremities: Warm, no edema   Pulses: 2+ radial pulses       Neurologic:  MS: Alert and oriented x 4, speech intact. Language intact. Attention and fund of knowledge appropriate. Recent and remote memory intact.   Cranial Nerves:  II: visual fields VFF   II: pupils PERRL   II: optic disc    III,VII: ptosis none   III,IV,VI: extraocular muscles  EOMI, no nystagmus or diplopia   V: facial light touch sensation     VII: facial muscle function   symmetric   VIII: hearing intact   IX: soft palate elevation     XI: trapezius strength     XI: sternocleidomastoid strength    XII: tongue       Motor: normal bulk and tone, no tremor              Strength: 5/5 throughout, no PD  Sensory:  Coordination: FTN, HTS, and MIL intact  Gait: normal gait, able to tandem walk  Reflexes: 2+ symmetric       Assessment/Plan   Pt is a 39 y.o. RH female with RRMS, diagnosed after presenting with sudden onset of sensory changes in April, 2017,  tingling in bottom of feet that moved up her legs with walking, hands bilaterally in 4th and 5th digits, moving across to other fingers, tingling from bra line down to just above her pubic bone, around to sides and area felt tight. MRI brain, C, T spine in April, 2017 c/w MS. Failed Copaxone, Aubagio, now on Ocrevus infusions every 6 months, first was 4/19/19, most recent 6/10/21. Last MRI brain and C-spine 3/10/20 were stable. Exam is stable and non-focal. Pt is doing very well since starting on Ocrevus. -Next Ocrevus infusion in 12/21.   -Pt requested to delay MRI brain and C-spine until next visit.   -Pt getting appropriate routine cancer screenings  -CMP, CBC with diff, and TSH monitoring labs  -F/u in clinic in 6 months, instructed to call in the interim if needed. ICD-10-CM ICD-9-CM    1. MS [facility]) [facility]  [id number] [id number] METABOLIC PANEL, COMPREHENSIVE      CBC WITH AUTOMATED DIFF      TSH 3RD GENERATION      METABOLIC PANEL, COMPREHENSIVE      CBC WITH AUTOMATED DIFF      TSH 3RD GENERATION   2. Encounter for long-term current use of high risk medication  B23.504 E63.80 METABOLIC PANEL, COMPREHENSIVE      CBC WITH AUTOMATED DIFF      TSH 3RD GENERATION      METABOLIC PANEL, COMPREHENSIVE      CBC WITH AUTOMATED DIFF      TSH 3RD GENERATION       Signed:   Belén Mariee MD  6/28/2021

## 2021-06-28 NOTE — LETTER
7/10/2021    Patient: Mary Parker   YOB: 1985   Date of Visit: 6/28/2021     Savita Ochoa MD  [address] [place] [id number]  Via Fax: [phone]    Dear Savita Ochoa MD,      Thank you for referring Ms. Mary Parker to [address] [place] [address] for evaluation. My notes for this consultation are attached. If you have questions, please do not hesitate to call me. I look forward to following your patient along with you.       Sincerely,    Britton Monge MD

## 2021-07-08 LAB
ALBUMIN SERPL-MCNC: 4.3 G/DL (ref 3.8–4.8)
ALBUMIN/GLOB SERPL: 1.8 {RATIO} (ref 1.2–2.2)
ALP SERPL-CCNC: 34 IU/L (ref 48–121)
ALT SERPL-CCNC: 20 IU/L (ref 0–32)
AST SERPL-CCNC: 23 IU/L (ref 0–40)
BASOPHILS # BLD AUTO: 0 X10E3/UL (ref 0–0.2)
BASOPHILS NFR BLD AUTO: 0 %
BILIRUB SERPL-MCNC: 0.3 MG/DL (ref 0–1.2)
BUN SERPL-MCNC: 11 MG/DL (ref 6–20)
BUN/CREAT SERPL: 19 (ref 9–23)
CALCIUM SERPL-MCNC: 9 MG/DL (ref 8.7–10.2)
CHLORIDE SERPL-SCNC: 104 MMOL/L (ref 96–106)
CO2 SERPL-SCNC: 21 MMOL/L (ref 20–29)
CREAT SERPL-MCNC: 0.57 MG/DL (ref 0.57–1)
EOSINOPHIL # BLD AUTO: 0.2 X10E3/UL (ref 0–0.4)
EOSINOPHIL NFR BLD AUTO: 1 %
ERYTHROCYTE [DISTWIDTH] IN BLOOD BY AUTOMATED COUNT: 11.9 % (ref 11.7–15.4)
GLOBULIN SER CALC-MCNC: 2.4 G/DL (ref 1.5–4.5)
GLUCOSE SERPL-MCNC: 81 MG/DL (ref 65–99)
HCT VFR BLD AUTO: 39.4 % (ref 34–46.6)
HGB BLD-MCNC: 13.1 G/DL (ref 11.1–15.9)
IMM GRANULOCYTES # BLD AUTO: 0 X10E3/UL (ref 0–0.1)
IMM GRANULOCYTES NFR BLD AUTO: 0 %
LYMPHOCYTES # BLD AUTO: 1.5 X10E3/UL (ref 0.7–3.1)
LYMPHOCYTES NFR BLD AUTO: 11 %
MCH RBC QN AUTO: 31.2 PG (ref 26.6–33)
MCHC RBC AUTO-ENTMCNC: 33.2 G/DL (ref 31.5–35.7)
MCV RBC AUTO: 94 FL (ref 79–97)
MONOCYTES # BLD AUTO: 0.9 X10E3/UL (ref 0.1–0.9)
MONOCYTES NFR BLD AUTO: 6 %
NEUTROPHILS # BLD AUTO: 11.5 X10E3/UL (ref 1.4–7)
NEUTROPHILS NFR BLD AUTO: 82 %
PLATELET # BLD AUTO: 214 X10E3/UL (ref 150–450)
POTASSIUM SERPL-SCNC: 4.6 MMOL/L (ref 3.5–5.2)
PROT SERPL-MCNC: 6.7 G/DL (ref 6–8.5)
RBC # BLD AUTO: 4.2 X10E6/UL (ref 3.77–5.28)
SODIUM SERPL-SCNC: 138 MMOL/L (ref 134–144)
TSH SERPL DL<=0.005 MIU/L-ACNC: 0.86 UIU/ML (ref 0.45–4.5)
WBC # BLD AUTO: 14.2 X10E3/UL (ref 3.4–10.8)

## 2021-07-14 ENCOUNTER — TELEPHONE (OUTPATIENT)
Dept: NEUROLOGY | Age: 36
End: 2021-07-14

## 2021-07-15 NOTE — TELEPHONE ENCOUNTER
Called and discussed elevated WBC count and ANS since April, 2019. In past, I believe we attributed this to infections, skin infection on face and colitis, but pt is denying any infection at the time of this draw. I suggested she discuss with her PCP at her next routine appt which is in October. [facility] [address] - Please fax CBC results to PCP.

## 2021-12-13 ENCOUNTER — TELEPHONE (OUTPATIENT)
Dept: NEUROLOGY | Age: 36
End: 2021-12-13

## 2021-12-13 NOTE — TELEPHONE ENCOUNTER
Called pt. No answer left message to call office. Call Tiffanie Fajardo hipaa verified. No answer left a detailed message on personal VM stating that [facility] has been trying to getting in contact w/ Robina Kunz regarding updated insurance information. Left the number that Robina Kunz needs to call to give the updated information ([phone]) and if any questions to call office.

## 2021-12-13 NOTE — TELEPHONE ENCOUNTER
Called pt. Verified. Pt states that she spoke [employer] and they have her updated insurance information.

## 2022-01-03 ENCOUNTER — VIRTUAL VISIT (OUTPATIENT)
Dept: NEUROLOGY | Age: 37
End: 2022-01-03
Payer: COMMERCIAL

## 2022-01-03 DIAGNOSIS — Z79.899 ENCOUNTER FOR LONG-TERM CURRENT USE OF HIGH RISK MEDICATION: ICD-10-CM

## 2022-01-03 DIAGNOSIS — G35 MS (MULTIPLE SCLEROSIS) (HCC): Primary | ICD-10-CM

## 2022-01-03 PROCEDURE — 99214 OFFICE O/P EST MOD 30 MIN: CPT | Performed by: PSYCHIATRY & NEUROLOGY

## 2022-01-03 NOTE — PROGRESS NOTES
Neurology Progress Note      HISTORY PROVIDED BY: patient  Gordon Castañeda is a 39 y.o. female who was seen by synchronous (real-time) audio-video technology on 1/3/2022. Two factor identification completed. Consent:  She and/or her healthcare decision maker is aware that this patient-initiated Telehealth encounter is a billable service, with coverage as determined by her insurance carrier. She is aware that she may receive a bill and has provided verbal consent to proceed: Yes    I was at home while conducting this encounter. I discussed with the patient the nature of our telemedicine visit: Our sessions are not being recorded and personal health information is protected. We will provide follow up care in person when the patient needs it. Pursuant to the emergency declaration under the [address] [place] [address], [facility] waiver authority and the [employer] Act, this Virtual  Visit was conducted, with patient's consent, to reduce the patient's risk of exposure to COVID-19 and provide continuity of care for an established patient. Chief Complaint:   Chief Complaint   Patient presents with    Multiple Sclerosis      Subjective:   Pt is a 39 y.o. RH female last seen in clinic on 6/28/21 in f/u for RRMS, diagnosed after presenting with sudden onset of sensory changes in April, 2017,  tingling in bottom of feet that moved up her legs with walking, hands bilaterally in 4th and 5th digits, moving across to other fingers, tingling from bra line down to just above her pubic bone, around to sides and area felt tight. MRI brain, C, T spine in April, 2017 c/w MS. Failed Copaxone, Aubagio, now on Ocrevus infusions every 6 months, first was 4/19/19, most recent 6/10/21. Last MRI brain and C-spine 3/10/20 were stable. Exam is stable and non-focal. Pt was doing very well since starting on Ocrevus.   -Next Ocrevus infusion in 12/21.   -Pt requested to delay MRI brain and C-spine until next visit.   -Pt getting appropriate routine cancer screenings  -CMP, CBC with diff, and TSH monitoring labs    She returns for virtual f/u. Last [address] [place] [address] was 12/17/21, next in June, 2022. No new neurological sxs. Doing very well. She is up to date on cancer screenings, except she has not had a mammogram since she is only 43yo, she is going to request PA from her insurance company to go ahead and have one given that she is on a high risk drug. She is due for MRIs and monitoring labs. She is no longer taking B12, Vit D, or MVI, was taking these on her own in past.      Previous imaging/DMTs:  MRI of C and T spine with contrast on 4/15/17 with abnormal signal with enhancement and possible subtle mass effect in the cord at C4. MRI brain w/wo contrast 4/17/17 with multiple T2/Flair hyperintensities in the PV and subcortical WM, per radiology <9 lesions, with enhancing lesion in the anterior right temporal lobe. Started Copaxone 6/30/17,   Had exacerbation 2/6/18, MRI brain 2/6/18 with multiple new enhancing lesions   Had another exacerbation 3/12/18 with MRI brain 3/13/18 with significant progression of lesions with multiple enhancing lesions since MRI brain one month prior   MRI C-spine 3/26/18 with improvement in hyperintensity and cord expansion at C4. Had another exacerbation 4/6/18, treated with Acthar x 5 days. [facility] after her insurance company denied treatment with Tysabri. Had progression of disease on MRI brain w/wo contrast 3/21/19 with several new enhancing lesions. MRI C-spine w/wo contrast 3/21/19 was stable without new or enhancing lesions seen. Started [address] [place] [address] 4/19/19  MRI brain and C-spine wo contrast 3/10/20 were stable.     Past Medical History:   Diagnosis Date    Encounter for long-term (current) use of high-risk medication     Multiple sclerosis ([facility] [address]     LP on 4/18/17 with normal OP 21mmHg, CSF studies were normal except +OGB and elevated IgG Index.  Multiple sclerosis (HCC)       Past Surgical History:   Procedure Laterality Date    HX ROTATOR CUFF REPAIR Right 1999      Social History     Socioeconomic History    Marital status:      Spouse name: Not on file    Number of children: Not on file    Years of education: Not on file    Highest education level: Not on file   Occupational History    Occupation:  at [employer] Use    Smoking status: Never Smoker    Smokeless tobacco: Never Used    Tobacco comment: smokes socially   Substance and Sexual Activity    Alcohol use: Yes     Alcohol/week: 2.0 - 3.0 standard drinks     Types: 2 - 3 Standard drinks or equivalent per week    Drug use: No    Sexual activity: Not on file   Other Topics Concern     Service Not Asked    Blood Transfusions Not Asked    Caffeine Concern Not Asked    Occupational Exposure Not Asked    Hobby Hazards Not Asked    Sleep Concern Not Asked    Stress Concern Not Asked    Weight Concern Not Asked    Special Diet Not Asked    Back Care Not Asked    Exercise Not Asked    Bike Helmet Not Asked   [address] Not Asked    Self-Exams Not Asked   Social History Narrative    Lives in [facility] with      Social Determinants of Health     Financial Resource Strain:     Difficulty of Paying Living Expenses: Not on file   Food Insecurity:     Worried About Running Out of Food in the Last Year: Not on file    Stephane of Food in the Last Year: Not on file   Transportation Needs:     Lack of Transportation (Medical): Not on file    Lack of Transportation (Non-Medical):  Not on file   Physical Activity:     Days of Exercise per Week: Not on file    Minutes of Exercise per Session: Not on file   Stress:     Feeling of Stress : Not on file   Social Connections:     Frequency of Communication with Friends and Family: Not on file    Frequency of Social Gatherings with Friends and Family: Not on file    Attends Gnosticism Services: Not on file    Active Member of Clubs or Organizations: Not on file    Attends Club or Organization Meetings: Not on file    Marital Status: Not on file   Intimate Partner Violence:     Fear of Current or Ex-Partner: Not on file    Emotionally Abused: Not on file    Physically Abused: Not on file    Sexually Abused: Not on file   Housing Stability:     Unable to Pay for Housing in the Last Year: Not on file    Number of [place] in the Last Year: Not on file    Unstable Housing in the Last Year: Not on file     Family History   Problem Relation Age of Onset   Tabby Love Sclerosis Mother         Dec 48yo    Other Father         Estranged    No Known Problems Sister     No Known Problems Brother     Other Brother         Hep C         Objective:   Review of Systems : Per HPI, o/w neg    No Known Allergies     Meds:    Current Outpatient Medications:     ocrelizumab (Ocrevus) 30 mg/mL soln injection, 20 mL by IntraVENous route every 6 months., Disp: 20 mL, Rfl: 1    ethynodiol-ethinyl estradiol (Dina Earnest 1/35, 28,) 1-35 mg-mcg tab, Take  by mouth., Disp: , Rfl:     Imaging:  MRI Results (most recent):  Results from [place] encounter on 03/10/20    MRI CERV SPINE WO CONT    Narrative  EXAM: MRI CERV SPINE WO CONT    INDICATION: Multiple sclerosis on Ocrevus, assess for progression of disease or  other abnormality. COMPARISON: MRI cervical spine on 3/21/2019 and 3/26/2018. TECHNIQUE: MR imaging of the cervical spine was performed using the following  sequences: sagittal T1, T2, STIR;  axial T2, T1.    CONTRAST:  None. FINDINGS:    There is normal alignment of the cervical spine. Vertebral body heights are  maintained. Marrow signal is normal. Discs are within normal limits. The craniocervical junction is intact. Hyperintense T2 signal in the dorsal  aspect of the cord at C4 is unchanged. No new cord lesion.     Hyperintense T2 lesions in the cerebellum and criss are unchanged. Pineal cyst is  better imaged. The paraspinal soft tissues are within normal limits. No herniation or stenosis. Impression  IMPRESSION:  Unchanged chronic demyelination in the dorsal spinal cord at C4. No new spinal  cord lesion. CT Results (most recent):  Results from Hospital Encounter encounter on 04/09/17    CT HEAD WO CONT    Narrative  EXAM:  CT HEAD WO CONT    INDICATION:   Head trauma, closed, mild, GCS >= 13, no risk factors, neuro exam  normal    COMPARISON: None. TECHNIQUE: Unenhanced CT of the head was performed using 5 mm images. Brain and  bone windows were generated. CT dose reduction was achieved through use of a  standardized protocol tailored for this examination and automatic exposure  control for dose modulation. FINDINGS:  There is no extra-axial fluid collection hemorrhage shift or masses her. Ventricles are normal in size and midline. Impression  impression: No acute changes. Reviewed records in [facility] [facility] [employer] tab today    Lab Review   Results for orders placed or performed in visit on 07/36/95   METABOLIC PANEL, COMPREHENSIVE   Result Value Ref Range    Glucose 81 65 - 99 mg/dL    BUN 11 6 - 20 mg/dL    Creatinine 0.57 0.57 - 1.00 mg/dL    GFR est non- >59 mL/min/1.73    GFR est  >59 mL/min/1.73    BUN/Creatinine ratio 19 9 - 23    Sodium 138 134 - 144 mmol/L    Potassium 4.6 3.5 - 5.2 mmol/L    Chloride 104 96 - 106 mmol/L    CO2 21 20 - 29 mmol/L    Calcium 9.0 8.7 - 10.2 mg/dL    Protein, total 6.7 6.0 - 8.5 g/dL    Albumin 4.3 3.8 - 4.8 g/dL    GLOBULIN, TOTAL 2.4 1.5 - 4.5 g/dL    A-G Ratio 1.8 1.2 - 2.2    Bilirubin, total 0.3 0.0 - 1.2 mg/dL    Alk.  phosphatase 34 (L) 48 - 121 IU/L    AST (SGOT) 23 0 - 40 IU/L    ALT (SGPT) 20 0 - 32 IU/L   CBC WITH AUTOMATED DIFF   Result Value Ref Range    WBC 14.2 (H) 3.4 - 10.8 x10E3/uL    RBC 4.20 3.77 - 5.28 x10E6/uL    HGB 13.1 11.1 - 15.9 g/dL    HCT 39.4 34.0 - 46.6 %    MCV 94 79 - 97 fL    MCH 31.2 26.6 - 33.0 pg    MCHC 33.2 31.5 - 35.7 g/dL    RDW 11.9 11.7 - 15.4 %    PLATELET 207 719 - 251 x10E3/uL    NEUTROPHILS 82 Not Estab. %    Lymphocytes 11 Not Estab. %    MONOCYTES 6 Not Estab. %    EOSINOPHILS 1 Not Estab. %    BASOPHILS 0 Not Estab. %    ABS. NEUTROPHILS 11.5 (H) 1.4 - 7.0 x10E3/uL    Abs Lymphocytes 1.5 0.7 - 3.1 x10E3/uL    ABS. MONOCYTES 0.9 0.1 - 0.9 x10E3/uL    ABS. EOSINOPHILS 0.2 0.0 - 0.4 x10E3/uL    ABS. BASOPHILS 0.0 0.0 - 0.2 x10E3/uL    IMMATURE GRANULOCYTES 0 Not Estab. %    ABS. IMM. GRANS. 0.0 0.0 - 0.1 x10E3/uL   TSH 3RD GENERATION   Result Value Ref Range    TSH 0.856 0.450 - 4.500 uIU/mL      Exam limited due to VV  Exam:  There were no vitals taken for this visit. General:  Alert, cooperative, no distress. Head:  Normocephalic, without obvious abnormality, atraumatic. Respiratory:  Heart:   Non labored breathing     Neck:      Extremities:    Pulses:        Neurologic:  MS: Alert and oriented x 4, speech intact. Language intact. Attention and fund of knowledge appropriate. Recent and remote memory intact.   Cranial Nerves:  II: visual fields    II: pupils    II: optic disc    III,VII: ptosis none   III,IV,VI: extraocular muscles  EOMI, no nystagmus or diplopia   V: facial light touch sensation     VII: facial muscle function   symmetric   VIII: hearing intact   IX: soft palate elevation     XI: trapezius strength     XI: sternocleidomastoid strength    XII: tongue       Motor: Able to stand without pushing off, no PD, no tremor  Sensory:  Coordination: FTN and MIL intact, romberg neg  Gait:  Reflexes:        Assessment/Plan   Pt is a 39 y.o. RH female with RRMS, diagnosed after presenting with sudden onset of sensory changes in April, 2017,  tingling in bottom of feet that moved up her legs with walking, hands bilaterally in 4th and 5th digits, moving across to other fingers, tingling from bra line down to just above her pubic bone, around to sides and area felt tight. MRI brain, C, T spine in April, 2017 c/w MS. Failed Copaxone, Aubagio, now on Ocrevus infusions every 6 months, first was 4/19/19, most recent 12/17/21. Last MRI brain and C-spine 3/10/20 were stable. Exam is limited due to VV, but appears stable and non-focal. Pt is doing very well since starting on Ocrevus. -Next Ocrevus infusion in 6/2022.   -MRI brain and C-spine wo contrast ordered  -Pt getting appropriate routine cancer screenings except has not had mammogram due to age <40y. Pt needs mammogram now due to use of high risk drug.   -CMP, CBC with diff, TSH, and Vit D monitoring labs ordered. Goal Vit D>40  -F/u in clinic in 6 months, instructed to call in the interim if needed. ICD-10-CM ICD-9-CM    1. [facility]  G35 [id number] CBC WITH AUTOMATED DIFF      METABOLIC PANEL, COMPREHENSIVE      TSH 3RD GENERATION      VITAMIN D, 25 HYDROXY      MRI BRAIN WO CONT      MRI CERV SPINE WO CONT   2. Encounter for long-term current use of high risk medication  Z79.899 V58.69 CBC WITH AUTOMATED DIFF      METABOLIC PANEL, COMPREHENSIVE      TSH 3RD GENERATION      VITAMIN D, 25 HYDROXY      MRI BRAIN WO CONT      MRI CERV SPINE WO CONT       Signed:   Dieudonne Nava MD  1/3/2022

## 2022-01-10 ENCOUNTER — TELEPHONE (OUTPATIENT)
Dept: NEUROLOGY | Age: 37
End: 2022-01-10

## 2022-01-10 NOTE — TELEPHONE ENCOUNTER
Called pt. Verified. Pt verbalizes mailing address which is on file ([address] [phone]) to send lab slip and MRI order.

## 2022-01-10 NOTE — TELEPHONE ENCOUNTER
Patient would like to know if we are going to be mailing her lab orders? [facility] doesn't have it in their system.     Thank you

## 2022-01-20 LAB
25(OH)D3+25(OH)D2 SERPL-MCNC: 32.8 NG/ML (ref 30–100)
ALBUMIN SERPL-MCNC: 4.2 G/DL (ref 3.8–4.8)
ALBUMIN/GLOB SERPL: 1.8 {RATIO} (ref 1.2–2.2)
ALP SERPL-CCNC: 28 IU/L (ref 44–121)
ALT SERPL-CCNC: 11 IU/L (ref 0–32)
AST SERPL-CCNC: 17 IU/L (ref 0–40)
BASOPHILS # BLD AUTO: 0 X10E3/UL (ref 0–0.2)
BASOPHILS NFR BLD AUTO: 0 %
BILIRUB SERPL-MCNC: 0.3 MG/DL (ref 0–1.2)
BUN SERPL-MCNC: 10 MG/DL (ref 6–20)
BUN/CREAT SERPL: 14 (ref 9–23)
CALCIUM SERPL-MCNC: 9.4 MG/DL (ref 8.7–10.2)
CHLORIDE SERPL-SCNC: 106 MMOL/L (ref 96–106)
CO2 SERPL-SCNC: 20 MMOL/L (ref 20–29)
CREAT SERPL-MCNC: 0.74 MG/DL (ref 0.57–1)
EOSINOPHIL # BLD AUTO: 0.1 X10E3/UL (ref 0–0.4)
EOSINOPHIL NFR BLD AUTO: 2 %
ERYTHROCYTE [DISTWIDTH] IN BLOOD BY AUTOMATED COUNT: 11.8 % (ref 11.7–15.4)
GLOBULIN SER CALC-MCNC: 2.3 G/DL (ref 1.5–4.5)
GLUCOSE SERPL-MCNC: 91 MG/DL (ref 65–99)
HCT VFR BLD AUTO: 39.9 % (ref 34–46.6)
HGB BLD-MCNC: 13.3 G/DL (ref 11.1–15.9)
IMM GRANULOCYTES # BLD AUTO: 0 X10E3/UL (ref 0–0.1)
IMM GRANULOCYTES NFR BLD AUTO: 1 %
LYMPHOCYTES # BLD AUTO: 1.5 X10E3/UL (ref 0.7–3.1)
LYMPHOCYTES NFR BLD AUTO: 23 %
MCH RBC QN AUTO: 30.2 PG (ref 26.6–33)
MCHC RBC AUTO-ENTMCNC: 33.3 G/DL (ref 31.5–35.7)
MCV RBC AUTO: 91 FL (ref 79–97)
MONOCYTES # BLD AUTO: 0.6 X10E3/UL (ref 0.1–0.9)
MONOCYTES NFR BLD AUTO: 9 %
NEUTROPHILS # BLD AUTO: 4.2 X10E3/UL (ref 1.4–7)
NEUTROPHILS NFR BLD AUTO: 65 %
PLATELET # BLD AUTO: 213 X10E3/UL (ref 150–450)
POTASSIUM SERPL-SCNC: 4.7 MMOL/L (ref 3.5–5.2)
PROT SERPL-MCNC: 6.5 G/DL (ref 6–8.5)
RBC # BLD AUTO: 4.41 X10E6/UL (ref 3.77–5.28)
SODIUM SERPL-SCNC: 140 MMOL/L (ref 134–144)
TSH SERPL DL<=0.005 MIU/L-ACNC: 1.47 UIU/ML (ref 0.45–4.5)
WBC # BLD AUTO: 6.4 X10E3/UL (ref 3.4–10.8)

## 2022-01-27 NOTE — TELEPHONE ENCOUNTER
Gisella - Please call pt: Monitoring labs 1/19/22 look good. Vit D level is in normal range, but recommend level >40 in MS patients. Suggest she start Vit D3 1000units daily.

## 2022-01-28 NOTE — TELEPHONE ENCOUNTER
Called pt. Verified. Inform her of the lab results done 1/19/22 and that Dr. Jacque Holloway suggest she start Vit D3 1000units daily. Pt verbalizes understanding.

## 2022-02-04 ENCOUNTER — HOSPITAL ENCOUNTER (OUTPATIENT)
Dept: MRI IMAGING | Age: 37
Discharge: HOME | End: 2022-02-04

## 2022-02-04 DIAGNOSIS — Z79.899 ENCOUNTER FOR LONG-TERM CURRENT USE OF HIGH RISK MEDICATION: ICD-10-CM

## 2022-02-04 DIAGNOSIS — G35 MS (MULTIPLE SCLEROSIS) (HCC): ICD-10-CM

## 2022-03-18 PROBLEM — G37.9 DEMYELINATING DISEASE (HCC): Status: ACTIVE | Noted: 2017-04-18

## 2022-03-18 PROBLEM — G35 MULTIPLE SCLEROSIS EXACERBATION (HCC): Status: ACTIVE | Noted: 2018-02-06

## 2022-03-18 PROBLEM — G95.9 SPINAL CORD LESION (HCC): Status: ACTIVE | Noted: 2017-04-17

## 2022-03-19 PROBLEM — G35 MS (MULTIPLE SCLEROSIS) (HCC): Status: ACTIVE | Noted: 2018-04-24

## 2022-03-19 PROBLEM — R20.2 PARESTHESIA OF BOTH FEET: Status: ACTIVE | Noted: 2018-12-10

## 2022-06-14 ENCOUNTER — TELEPHONE (OUTPATIENT)
Dept: NEUROLOGY | Age: 37
End: 2022-06-14

## 2022-06-14 DIAGNOSIS — G35 MS (MULTIPLE SCLEROSIS) (HCC): Primary | ICD-10-CM

## 2022-06-14 DIAGNOSIS — Z79.899 ENCOUNTER FOR LONG-TERM CURRENT USE OF HIGH RISK MEDICATION: ICD-10-CM

## 2022-06-14 NOTE — TELEPHONE ENCOUNTER
J Luis Hernandez from outpatient infusion calling wanting the last office visit note and a new order form they would also like a copy of her MRI and IGG levels.  Please fax to   [phone]

## 2022-06-14 NOTE — TELEPHONE ENCOUNTER
Ruby - Please complete the Ocrevus infusion form for the infusion center and I will sign it. Please send a copy of her last OV note, 1/3/22 and MRI brain 2/4/22 report. I have ordered immunoglobulin levels, please let pt know.

## 2022-06-15 ENCOUNTER — TELEPHONE (OUTPATIENT)
Dept: NEUROLOGY | Age: 37
End: 2022-06-15

## 2022-06-15 NOTE — TELEPHONE ENCOUNTER
Patient called and wants to know if the Blood work request can be sent over by fax to [facility] at [address] [phone] today?     Patient would like a call back from the nurse

## 2022-06-15 NOTE — TELEPHONE ENCOUNTER
Ocrevus order completed and signed, faxed to [facility] with Office Note and MRI. Scanned. Order for IGG levels mailed to patient, caity.

## 2022-06-19 LAB
IGG SERPL-MCNC: 674 MG/DL (ref 586–1602)
IGG1 SER-MCNC: 276 MG/DL (ref 248–810)
IGG2 SER-MCNC: 336 MG/DL (ref 130–555)
IGG3 SER-MCNC: 45 MG/DL (ref 15–102)
IGG4 SER-MCNC: 2 MG/DL (ref 2–96)

## 2022-06-20 ENCOUNTER — TELEPHONE (OUTPATIENT)
Dept: NEUROLOGY | Age: 37
End: 2022-06-20

## 2022-06-20 NOTE — TELEPHONE ENCOUNTER
----- Message from Ericka Masterson MD sent at 6/20/2022  1:44 PM EDT -----  Cyrus Davis - Please send to the infusion center.

## 2022-07-13 ENCOUNTER — OFFICE VISIT (OUTPATIENT)
Dept: NEUROLOGY | Age: 37
End: 2022-07-13
Payer: COMMERCIAL

## 2022-07-13 VITALS
SYSTOLIC BLOOD PRESSURE: 108 MMHG | DIASTOLIC BLOOD PRESSURE: 63 MMHG | BODY MASS INDEX: 32.03 KG/M2 | HEART RATE: 77 BPM | WEIGHT: 187.6 LBS | HEIGHT: 64 IN | OXYGEN SATURATION: 96 %

## 2022-07-13 DIAGNOSIS — G35 MS (MULTIPLE SCLEROSIS) (HCC): Primary | ICD-10-CM

## 2022-07-13 DIAGNOSIS — Z79.899 ENCOUNTER FOR LONG-TERM CURRENT USE OF HIGH RISK MEDICATION: ICD-10-CM

## 2022-07-13 PROCEDURE — 99214 OFFICE O/P EST MOD 30 MIN: CPT | Performed by: PSYCHIATRY & NEUROLOGY

## 2022-07-13 RX ORDER — CHOLECALCIFEROL TAB 125 MCG (5000 UNIT) 125 MCG
5000 TAB ORAL DAILY
COMMUNITY

## 2022-07-13 NOTE — PROGRESS NOTES
Neurology Progress Note      HISTORY PROVIDED BY: patient    Chief Complaint:   Chief Complaint   Patient presents with    Follow-up    Multiple Sclerosis      Subjective:   Pt is a 44 y.o. RH female last seen virtually on 1/3/22 in f/u for RRMS, diagnosed after presenting with sudden onset of sensory changes in April, 2017,  tingling in bottom of feet that moved up her legs with walking, hands bilaterally in 4th and 5th digits, moving across to other fingers, tingling from bra line down to just above her pubic bone, around to sides and area felt tight. MRI brain, C, T spine in April, 2017 c/w MS. Failed Copaxone, Aubagio, now on Ocrevus infusions every 6 months, first was 4/19/19, most recent 12/17/21. Last MRI brain and C-spine 3/10/20 were stable. Exam was limited due to VV, but appeared stable and non-focal. Pt was doing very well since starting on Ocrevus. -Next Ocrevus infusion in 6/2022.   -MRI brain and C-spine wo contrast ordered  -Pt getting appropriate routine cancer screenings except has not had mammogram due to age <40y. Pt needs mammogram now due to use of high risk drug.   -CMP, CBC with diff, TSH, and Vit D monitoring labs ordered. Goal Vit D>40    She returns for f/u. Monitoring labs 1/19/22 -TSH, CMP, and CBC with diff were unremarkable. Vit D level was 32.8, recommended level >40 in MS patients. Suggested she start Vit D3 1000units daily. MRI brain and C-spine 2/4/22 with no new lesions. No new MS sxs. In last week, she has a wisdom tooth that is coming out, has an appt with dentist. No infections. No cancer. She continues to have dermatitis on right face in perioral region. Last Ocrevus infusion was 6/20/22. Asks about getting COVID-19, second booster. Previous imaging/DMTs:  MRI of C and T spine with contrast on 4/15/17 with abnormal signal with enhancement and possible subtle mass effect in the cord at C4.    MRI brain w/wo contrast 4/17/17 with multiple T2/Flair hyperintensities in the PV and subcortical WM, per radiology <9 lesions, with enhancing lesion in the anterior right temporal lobe. Started Copaxone 6/30/17,   Had exacerbation 2/6/18, MRI brain 2/6/18 with multiple new enhancing lesions   Had another exacerbation 3/12/18 with MRI brain 3/13/18 with significant progression of lesions with multiple enhancing lesions since MRI brain one month prior   MRI C-spine 3/26/18 with improvement in hyperintensity and cord expansion at C4. Had another exacerbation 4/6/18, treated with Acthar x 5 days. Switched to Donzella Calix 4/24/18 after her insurance company denied treatment with Tysabri. Had progression of disease on MRI brain w/wo contrast 3/21/19 with several new enhancing lesions. MRI C-spine w/wo contrast 3/21/19 was stable without new or enhancing lesions seen. Started Ana Grieves 4/19/19  MRI brain and C-spine wo contrast 3/10/20 were stable. Past Medical History:   Diagnosis Date    Encounter for long-term (current) use of high-risk medication     Multiple sclerosis ([facility] [address]     LP on 4/18/17 with normal OP 21mmHg, CSF studies were normal except +OGB and elevated IgG Index.  Multiple sclerosis (HCC)       Past Surgical History:   Procedure Laterality Date    HX ROTATOR CUFF REPAIR Right 1999      Social History     Socioeconomic History    Marital status:      Spouse name: Not on file    Number of children: Not on file    Years of education: Not on file    Highest education level: Not on file   Occupational History    Occupation:  at [employer] Use    Smoking status: Never Smoker    Smokeless tobacco: Never Used    Tobacco comment: smokes socially   Vaping Use    Vaping Use: Never used   Substance and Sexual Activity    Alcohol use:  Yes     Alcohol/week: 2.0 - 3.0 standard drinks     Types: 2 - 3 Standard drinks or equivalent per week    Drug use: No    Sexual activity: Not on file   Other Topics Concern     Service Not Asked    Blood Transfusions Not Asked    Caffeine Concern Not Asked    Occupational Exposure Not Asked    Hobby Hazards Not Asked    Sleep Concern Not Asked    Stress Concern Not Asked    Weight Concern Not Asked    Special Diet Not Asked    Back Care Not Asked    Exercise Not Asked    Bike Helmet Not Asked   [address] Not Asked    Self-Exams Not Asked   Social History Narrative    Lives in [place] with      Social Determinants of Health     Financial Resource Strain:     Difficulty of Paying Living Expenses: Not on file   Food Insecurity:     Worried About Running Out of Food in the Last Year: Not on file    Stephane of Food in the Last Year: Not on file   Transportation Needs:     Lack of Transportation (Medical): Not on file    Lack of Transportation (Non-Medical):  Not on file   Physical Activity:     Days of Exercise per Week: Not on file    Minutes of Exercise per Session: Not on file   Stress:     Feeling of Stress : Not on file   Social Connections:     Frequency of Communication with Friends and Family: Not on file    Frequency of Social Gatherings with Friends and Family: Not on file    Attends Zoroastrianism Services: Not on file    Active Member of [address] [place] [address] or Organizations: Not on file    Attends Club or Organization Meetings: Not on file    Marital Status: Not on file   Intimate Partner Violence:     Fear of Current or Ex-Partner: Not on file    Emotionally Abused: Not on file    Physically Abused: Not on file    Sexually Abused: Not on file   Housing Stability:     Unable to Pay for Housing in the Last Year: Not on file    Number of [place] in the Last Year: Not on file    Unstable Housing in the Last Year: Not on file     Family History   Problem Relation Age of Onset   Ricarda Bidding Sclerosis Mother         Dec 48yo    Other Father         Estranged    No Known Problems Sister     No Known Problems Brother     Other Brother         Hep C Objective:   Review of Systems : Per HPI, o/w neg    No Known Allergies     Meds:    Current Outpatient Medications:     ocrelizumab (Ocrevus) 30 mg/mL soln injection, 20 mL by IntraVENous route every 6 months., Disp: 20 mL, Rfl: 1    ethynodiol-ethinyl estradiol (Martina Jax 1/35, 28,) 1-35 mg-mcg tab, Take  by mouth., Disp: , Rfl:     Imaging:  MRI Results (most recent):  Results from Hospital Encounter encounter on 02/04/22    MRI BRAIN WO CONT    Narrative  INDICATION:   MS, eval for new lesions or opportunistic infection    EXAMINATION:  MRI BRAIN WO CONTRAST    COMPARISON:  March 10, 2020    TECHNIQUE:  Multiplanar multisequence acquisition without contrast of the brain. FINDINGS:    Ventricles:  Midline, no hydrocephalus. Brain Parenchyma/Brainstem:  Mild supratentorial white matter disease without  significant interval change. Small T2 hyperintensity right cerebellum also  unchanged. No acute infarction. Intracranial Hemorrhage:  None. Basal Cisterns:  Normal.  Flow Voids:  Normal.  Additional Comments:  N/A. Impression  No significant change in mild white matter disease burden consistent with the  provided history of multiple sclerosis. No acute process. CT Results (most recent):  Results from Hospital Encounter encounter on 04/09/17    CT HEAD WO CONT    Narrative  EXAM:  CT HEAD WO CONT    INDICATION:   Head trauma, closed, mild, GCS >= 13, no risk factors, neuro exam  normal    COMPARISON: None. TECHNIQUE: Unenhanced CT of the head was performed using 5 mm images. Brain and  bone windows were generated. CT dose reduction was achieved through use of a  standardized protocol tailored for this examination and automatic exposure  control for dose modulation. FINDINGS:  There is no extra-axial fluid collection hemorrhage shift or masses her. Ventricles are normal in size and midline. Impression  impression: No acute changes.        Reviewed records in [employer] and [facility] tab today    Lab Review   Results for orders placed or performed in visit on 06/15/22   IGG SUBCLASSES   Result Value Ref Range    Immunoglobulin G, Qt. 674 586 - 1,602 mg/dL    IgG, Subclass 1 276 248 - 810 mg/dL    IgG, Subclass 2 336 130 - 555 mg/dL    IgG, Subclass 3 45 15 - 102 mg/dL    IgG, Subclass 4 2 2 - 96 mg/dL      Exam limited due to VV  Exam:  Visit Vitals  /63   Pulse 77   Ht 5' 4\" (1.626 m)   Wt 187 lb 9.6 oz (85.1 kg)   SpO2 96%   BMI 32.20 kg/m²     General:  Alert, cooperative, no distress. Head:  Normocephalic, without obvious abnormality, atraumatic. Respiratory:  Heart:   Non labored breathing  RRR, no murmurs   Neck:   2+ carotids, no bruits   Extremities: Warm, no edema   Pulses: 2+ radial pulses       Neurologic:  MS: Alert and oriented x 4, speech intact. Language intact. Attention and fund of knowledge appropriate. Recent and remote memory intact. Cranial Nerves:  II: visual fields VFF   II: pupils PERRL   II: optic disc    III,VII: ptosis none   III,IV,VI: extraocular muscles  EOMI, no nystagmus or diplopia   V: facial light touch sensation     VII: facial muscle function   symmetric   VIII: hearing intact   IX: soft palate elevation     XI: trapezius strength     XI: sternocleidomastoid strength    XII: tongue       Motor: 5/5 throughout, no PD, no tremor  Sensory:  Coordination: FTN, MIL, HTS intact  Gait:Normal gait  Reflexes: 2+ sym       Assessment/Plan   Pt is a 44 y.o. RH female with RRMS, diagnosed after presenting with sudden onset of sensory changes in April, 2017,  tingling in bottom of feet that moved up her legs with walking, hands bilaterally in 4th and 5th digits, moving across to other fingers, tingling from bra line down to just above her pubic bone, around to sides and area felt tight. MRI brain, C, T spine in April, 2017 c/w MS. Failed Copaxone, Aubagio, now on Ocrevus infusions every 6 months, first was 4/19/19, most recent 6/20/22, no exacerbations.  No significant complications from therapy. Last MRI brain and C-spine 2/4/22 are stable. Exam is limited due to VV, but appears stable and non-focal. Pt is doing very well since starting on Ocrevus. -Next Ocrevus infusion in 12/2022.   -MRI brain and C-spine wo contrast ordered  -Pt getting appropriate routine cancer screenings  -CMP, CBC with diff, and Vit D monitoring labs ordered. Goal Vit D>40  -Continue current dosing of Vit D3, she believes 5000units every 2-3 days, pending level.  -Discussed timing of COVID-19 booster  -F/u in clinic in 6 months, instructed to call in the interim if needed. ICD-10-CM ICD-9-CM    1. MS [facility]) ([facility]  B10 825 METABOLIC PANEL, COMPREHENSIVE      CBC WITH AUTOMATED DIFF      VITAMIN D, 25 HYDROXY   2. Encounter for long-term current use of high risk medication  L16.874 X64.85 METABOLIC PANEL, COMPREHENSIVE      CBC WITH AUTOMATED DIFF      VITAMIN D, 25 HYDROXY       Signed:   Michael Vasquez MD  7/13/2022

## 2022-07-13 NOTE — LETTER
7/13/2022    Patient: Chase Monroy   YOB: 1985   Date of Visit: 7/13/2022     Luis Mcnamara MD  [address] [address]  Via Fax: [phone]    Dear Luis Mcnamara MD,      Thank you for referring Ms. Chase Monroy to [address] [place] [address] for evaluation. My notes for this consultation are attached. If you have questions, please do not hesitate to call me. I look forward to following your patient along with you.       Sincerely,    Ariela Valle MD

## 2022-07-15 ENCOUNTER — TELEPHONE (OUTPATIENT)
Dept: NEUROLOGY | Age: 37
End: 2022-07-15

## 2022-07-15 LAB
25(OH)D3+25(OH)D2 SERPL-MCNC: 76.8 NG/ML (ref 30–100)
ALBUMIN SERPL-MCNC: 4.4 G/DL (ref 3.8–4.8)
ALBUMIN/GLOB SERPL: 2 {RATIO} (ref 1.2–2.2)
ALP SERPL-CCNC: 27 IU/L (ref 44–121)
ALT SERPL-CCNC: 30 IU/L (ref 0–32)
AST SERPL-CCNC: 23 IU/L (ref 0–40)
BASOPHILS # BLD AUTO: 0 X10E3/UL (ref 0–0.2)
BASOPHILS NFR BLD AUTO: 0 %
BILIRUB SERPL-MCNC: 0.2 MG/DL (ref 0–1.2)
BUN SERPL-MCNC: 12 MG/DL (ref 6–20)
BUN/CREAT SERPL: 18 (ref 9–23)
CALCIUM SERPL-MCNC: 9.3 MG/DL (ref 8.7–10.2)
CHLORIDE SERPL-SCNC: 101 MMOL/L (ref 96–106)
CO2 SERPL-SCNC: 23 MMOL/L (ref 20–29)
CREAT SERPL-MCNC: 0.65 MG/DL (ref 0.57–1)
EGFR: 115 ML/MIN/1.73
EOSINOPHIL # BLD AUTO: 0.1 X10E3/UL (ref 0–0.4)
EOSINOPHIL NFR BLD AUTO: 1 %
ERYTHROCYTE [DISTWIDTH] IN BLOOD BY AUTOMATED COUNT: 11.6 % (ref 11.7–15.4)
GLOBULIN SER CALC-MCNC: 2.2 G/DL (ref 1.5–4.5)
GLUCOSE SERPL-MCNC: 88 MG/DL (ref 65–99)
HCT VFR BLD AUTO: 39.7 % (ref 34–46.6)
HGB BLD-MCNC: 13.4 G/DL (ref 11.1–15.9)
IMM GRANULOCYTES # BLD AUTO: 0 X10E3/UL (ref 0–0.1)
IMM GRANULOCYTES NFR BLD AUTO: 0 %
LYMPHOCYTES # BLD AUTO: 1.8 X10E3/UL (ref 0.7–3.1)
LYMPHOCYTES NFR BLD AUTO: 25 %
MCH RBC QN AUTO: 30.7 PG (ref 26.6–33)
MCHC RBC AUTO-ENTMCNC: 33.8 G/DL (ref 31.5–35.7)
MCV RBC AUTO: 91 FL (ref 79–97)
MONOCYTES # BLD AUTO: 0.7 X10E3/UL (ref 0.1–0.9)
MONOCYTES NFR BLD AUTO: 9 %
NEUTROPHILS # BLD AUTO: 4.7 X10E3/UL (ref 1.4–7)
NEUTROPHILS NFR BLD AUTO: 65 %
PLATELET # BLD AUTO: 191 X10E3/UL (ref 150–450)
POTASSIUM SERPL-SCNC: 4.2 MMOL/L (ref 3.5–5.2)
PROT SERPL-MCNC: 6.6 G/DL (ref 6–8.5)
RBC # BLD AUTO: 4.37 X10E6/UL (ref 3.77–5.28)
SODIUM SERPL-SCNC: 141 MMOL/L (ref 134–144)
WBC # BLD AUTO: 7.3 X10E3/UL (ref 3.4–10.8)

## 2022-07-15 NOTE — PROGRESS NOTES
Ruby - Please call pt: Monitoring labs 7/14/22 - CMP and CBC with diff look good. Vit D level is at goal >40, 76. 8.

## 2022-07-15 NOTE — TELEPHONE ENCOUNTER
----- Message from Joaquín Oreilly MD sent at 7/15/2022 12:57 PM EDT -----  Kaden Sandoval - Please call pt: Monitoring labs 7/14/22 - CMP and CBC with diff look good. Vit D level is at goal >40, 76.8.

## 2022-07-15 NOTE — TELEPHONE ENCOUNTER
Pt Id confirmed. Per Dr Raúl Chandra, \"Monitoring labs 7/14/22 - CMP and CBC with diff look good. Vit D level is at goal >40, 76.8. Pt said \"fantastic! \"

## 2022-12-28 ENCOUNTER — TELEPHONE (OUTPATIENT)
Dept: NEUROLOGY | Age: 37
End: 2022-12-28

## 2023-01-04 NOTE — TELEPHONE ENCOUNTER
Called the Pt. Back and gave her the number for medical records her request form is under media. Explained since 5 years of information to be sent to [facility] we do not do this in the office medical records must complete.

## 2024-05-10 ENCOUNTER — HOSPITAL ENCOUNTER (EMERGENCY)
Facility: HOSPITAL | Age: 39
Discharge: HOME | End: 2024-05-11
Attending: EMERGENCY MEDICINE
Payer: COMMERCIAL

## 2024-05-10 VITALS
HEIGHT: 64 IN | BODY MASS INDEX: 30.73 KG/M2 | WEIGHT: 180 LBS | HEART RATE: 93 BPM | RESPIRATION RATE: 16 BRPM | TEMPERATURE: 98.7 F | OXYGEN SATURATION: 97 % | DIASTOLIC BLOOD PRESSURE: 68 MMHG | SYSTOLIC BLOOD PRESSURE: 124 MMHG

## 2024-05-10 DIAGNOSIS — S61.313A LACERATION OF LEFT MIDDLE FINGER WITHOUT FOREIGN BODY WITH DAMAGE TO NAIL, INITIAL ENCOUNTER: Primary | ICD-10-CM

## 2024-05-10 PROCEDURE — 99283 EMERGENCY DEPT VISIT LOW MDM: CPT

## 2024-05-10 ASSESSMENT — PAIN DESCRIPTION - FREQUENCY: FREQUENCY: CONTINUOUS

## 2024-05-10 ASSESSMENT — PAIN DESCRIPTION - LOCATION: LOCATION: FINGER (COMMENT WHICH ONE)

## 2024-05-10 ASSESSMENT — PAIN SCALES - GENERAL: PAINLEVEL_OUTOF10: 5

## 2024-05-10 ASSESSMENT — PAIN DESCRIPTION - PAIN TYPE: TYPE: ACUTE PAIN

## 2024-05-10 ASSESSMENT — PAIN DESCRIPTION - ORIENTATION: ORIENTATION: LEFT

## 2024-05-10 ASSESSMENT — PAIN DESCRIPTION - DESCRIPTORS: DESCRIPTORS: ACHING

## 2024-05-10 ASSESSMENT — PAIN - FUNCTIONAL ASSESSMENT: PAIN_FUNCTIONAL_ASSESSMENT: 0-10

## 2024-05-11 PROCEDURE — 6370000000 HC RX 637 (ALT 250 FOR IP): Performed by: EMERGENCY MEDICINE

## 2024-05-11 PROCEDURE — 12001 RPR S/N/AX/GEN/TRNK 2.5CM/<: CPT

## 2024-05-11 PROCEDURE — 2500000003 HC RX 250 WO HCPCS: Performed by: EMERGENCY MEDICINE

## 2024-05-11 RX ORDER — CEPHALEXIN 500 MG/1
500 CAPSULE ORAL 2 TIMES DAILY
Qty: 14 CAPSULE | Refills: 0 | Status: SHIPPED | OUTPATIENT
Start: 2024-05-11 | End: 2024-05-18

## 2024-05-11 RX ORDER — GINSENG 100 MG
CAPSULE ORAL ONCE
Status: COMPLETED | OUTPATIENT
Start: 2024-05-11 | End: 2024-05-11

## 2024-05-11 RX ORDER — LIDOCAINE HYDROCHLORIDE AND EPINEPHRINE 10; 10 MG/ML; UG/ML
10 INJECTION, SOLUTION INFILTRATION; PERINEURAL ONCE
Status: COMPLETED | OUTPATIENT
Start: 2024-05-11 | End: 2024-05-11

## 2024-05-11 RX ADMIN — BACITRACIN 1 EACH: 500 OINTMENT TOPICAL at 02:53

## 2024-05-11 RX ADMIN — LIDOCAINE HYDROCHLORIDE,EPINEPHRINE BITARTRATE 10 ML: 10; .01 INJECTION, SOLUTION INFILTRATION; PERINEURAL at 00:16

## 2024-05-11 NOTE — ED TRIAGE NOTES
Patient reports laceration to the middle finger left hand with a kitchen knife. Patient is up to date with  Tdap. Took Advil prior to coming.

## 2024-05-11 NOTE — ED PROVIDER NOTES
[facility] EMERGENCY CTR  EMERGENCY DEPARTMENT ENCOUNTER      Pt Name: Nyla Fernando  MRN: [id number]  Birthdate 1985  Date of evaluation: 5/10/2024  Provider: Hero Vasques MD    CHIEF COMPLAINT       Chief Complaint   Patient presents with    Laceration         HISTORY OF PRESENT ILLNESS   (Location/Symptom, Timing/Onset, Context/Setting, Quality, Duration, Modifying Factors, Severity)  Note limiting factors.   The history is provided by the patient and the spouse.   Hand Problem  Location:  Finger  Finger location:  L middle finger  Injury: yes    Time since incident:  30 minutes  Mechanism of injury: stab wound    Stab injury:     Number of wounds:  1    Penetrating object:  Knife    Blade type:  Single-edged    Edge type:  Smooth    Inflicted by:   knife while cutting shallots to make chili    Suspected intent:  Accidental  Pain details:     Quality:  Sharp    Radiates to:  Does not radiate  Handedness:  Right-handed  Dislocation: no    Foreign body present:  No foreign bodies  Tetanus status:  Up to date  Relieved by:  Rest  Worsened by:  Movement  Associated symptoms: no decreased range of motion, no numbness, no swelling and no tingling    Risk factors comment:  MS with recent immunomodulator infusion        Review of External Medical Records:     Nursing Notes were reviewed.    REVIEW OF SYSTEMS    (2-9 systems for level 4, 10 or more for level 5)     Review of Systems    Except as noted above the remainder of the review of systems was reviewed and negative.       PAST MEDICAL HISTORY     Past Medical History:   Diagnosis Date    Encounter for long-term (current) use of high-risk medication     Multiple sclerosis (HCC)     Multiple sclerosis (HCC)     LP on 4/18/17 with normal OP 21mmHg, CSF studies were normal except +OGB and elevated IgG Index.         SURGICAL HISTORY       Past Surgical History:   Procedure Laterality Date    ROTATOR CUFF REPAIR Right 1999         CURRENT MEDICATIONS    Standard    Irrigation solution:  Sterile saline    Irrigation method:  Pressure wash    Debridement:  None    Undermining:  None    Scar revision: no    Skin repair:     Repair method:  Sutures    Suture size:  5-0    Suture material:  Nylon    Suture technique:  Simple interrupted    Number of sutures:  3  Approximation:     Approximation:  Close  Repair type:     Repair type:  Simple  Post-procedure details:     Dressing:  Antibiotic ointment and non-adherent dressing    Procedure completion:  Tolerated well, no immediate complications      MEDS:  Medications   lidocaine-EPINEPHrine 1 %-1:[id number] injection 10 mL (10 mLs IntraDERmal Given 5/11/24 0016)   bacitracin ointment (1 each Topical Given 5/11/24 0253)           FINAL IMPRESSION      1. Laceration of left middle finger without foreign body with damage to nail, initial encounter          DISPOSITION/PLAN   DISPOSITION   Decision To Discharge  05/11/2024 02:42:03 AM      PATIENT REFERRED TO:  Franklin Katz MD  [address]  [address]  [facility] [address] [phone]      For suture removal      DISCHARGE MEDICATIONS:  Discharge Medication List as of 5/11/2024  2:42 AM        START taking these medications    Details   cephALEXin (KEFLEX) 500 MG capsule Take 1 capsule by mouth 2 times daily for 7 days, Disp-14 capsule, R-0Normal               (Please note that portions of this note were completed with a voice recognition program.  Efforts were made to edit the dictations but occasionally words are mis-transcribed.)    Hero Vasques MD (electronically signed)  Emergency Attending Physician              Hero Vasques MD  05/11/24 0516       Hero Vasques MD  05/11/24 0518

## 2024-05-11 NOTE — ED NOTES
Wound dressed prior discharge. Discharge instructions on medications, follow up care and symptom management discussed with patient. Opportunity for questions was given and questions answered.

## 2025-01-08 ENCOUNTER — HOSPITAL ENCOUNTER (OUTPATIENT)
Facility: HOSPITAL | Age: 40
Discharge: HOME | End: 2025-01-11
Payer: COMMERCIAL

## 2025-01-08 DIAGNOSIS — R10.9 ACUTE ABDOMINAL PAIN: ICD-10-CM

## 2025-01-08 PROCEDURE — 74176 CT ABD & PELVIS W/O CONTRAST: CPT
